# Patient Record
Sex: FEMALE | Race: WHITE | NOT HISPANIC OR LATINO | Employment: OTHER | URBAN - METROPOLITAN AREA
[De-identification: names, ages, dates, MRNs, and addresses within clinical notes are randomized per-mention and may not be internally consistent; named-entity substitution may affect disease eponyms.]

---

## 2017-11-30 ENCOUNTER — OFFICE VISIT (OUTPATIENT)
Dept: URGENT CARE | Facility: CLINIC | Age: 62
End: 2017-11-30
Payer: COMMERCIAL

## 2017-11-30 VITALS
TEMPERATURE: 98.5 F | WEIGHT: 145.4 LBS | HEART RATE: 84 BPM | RESPIRATION RATE: 16 BRPM | OXYGEN SATURATION: 97 % | SYSTOLIC BLOOD PRESSURE: 142 MMHG | HEIGHT: 65 IN | BODY MASS INDEX: 24.22 KG/M2 | DIASTOLIC BLOOD PRESSURE: 60 MMHG

## 2017-11-30 DIAGNOSIS — F41.9 ANXIETY: ICD-10-CM

## 2017-11-30 DIAGNOSIS — G47.9 DIFFICULTY SLEEPING: ICD-10-CM

## 2017-11-30 PROCEDURE — 99204 OFFICE O/P NEW MOD 45 MIN: CPT | Performed by: NURSE PRACTITIONER

## 2017-11-30 RX ORDER — ALPRAZOLAM 0.25 MG/1
0.25 TABLET ORAL 2 TIMES DAILY PRN
Qty: 10 TAB | Refills: 0 | Status: SHIPPED | OUTPATIENT
Start: 2017-11-30 | End: 2018-11-30

## 2017-11-30 RX ORDER — FLUOXETINE 20 MG/1
20 TABLET, FILM COATED ORAL DAILY
Qty: 30 TAB | Refills: 0 | Status: SHIPPED | OUTPATIENT
Start: 2017-11-30 | End: 2018-11-30

## 2017-11-30 RX ORDER — RANITIDINE 150 MG/1
150 TABLET ORAL DAILY
COMMUNITY
End: 2020-06-16

## 2017-11-30 NOTE — PROGRESS NOTES
Chief Complaint   Patient presents with   • Anxiety     unabe to sleep, personal issues       HISTORY OF PRESENT ILLNESS: Patient is a 62 y.o. female who presents to urgent care today with concerns of anxiety and difficulty sleeping. States her symptoms started three weeks ago when she stopped taking her Prozac. She has been on Prozac, at 20mg per day, for the past seven years for anxiety. States her PCP would not refill her medication without being seen and she could not get appointment in time, therefore had to DC abruptly. In addition to anxiety and difficulty sleeping, she is also feeling emotional, tearful, and is having a hard time focusing. Denies suicidal or homicidal thoughts. Denies alcohol or illicit drug use.       Patient Active Problem List    Diagnosis Date Noted   • Syncope 10/03/2016   • GERD (gastroesophageal reflux disease) 10/03/2016       Allergies:Contrast media with iodine [iodine] and Other environmental    Current Outpatient Prescriptions Ordered in [facility]   Medication Sig Dispense Refill   • ranitidine (ZANTAC) 150 MG Tab Take 150 mg by mouth 2 times a day.     • simethicone (MYLICON) 80 MG Chew Tab Take 1 Tab by mouth 3 times a day as needed (GAs). 30 Tab 3   • lactobacillus rhamnosus, GG, (CULTURELLE) 10 B CELL Cap capsule Take 1 Cap by mouth every day. 30 Cap 0   • RABEprazole Sodium (ACIPHEX SPRINKLE) 10 MG CAPSULE SPRINKLE Take 10 mg by mouth every day.     • Diphenhydramine-APAP, sleep, (TYLENOL PM EXTRA STRENGTH)  MG Tab Take 2 Tabs by mouth every bedtime.     • fluoxetine (PROZAC) 20 MG tablet Take 20 mg by mouth every day.     • fexofenadine (ALLEGRA) 180 MG tablet Take 180 mg by mouth every bedtime.     • Cholecalciferol (VITAMIN D) 2000 UNIT CAPS Take 2 Caps by mouth every day.     • Omega 3-6-9 Fatty Acids (OMEGA-3 & OMEGA-6 FISH OIL) CAPS Take 1 Cap by mouth every day.     • NON SPECIFIED Take 1 Each by mouth every bedtime. Chewable multi-vit   DO-TERRA     • Ferrous  "Fumarate 325 (106 FE) MG TABS Take 1 Tab by mouth every bedtime.       No current Epic-ordered facility-administered medications on file.        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   History reviewed. No pertinent family history.    ROS:  Review of Systems   Constitutional: Negative for fever, chills, weight loss, malaise, and fatigue.   HENT: Negative for ear pain, nosebleeds, congestion, sore throat and neck pain.    Eyes: Negative for vision changes.   Neuro: Negative for headache, sensory changes, weakness, seizure, LOC.   Cardiovascular: Negative for chest pain, palpitations, orthopnea and leg swelling.   Respiratory: Negative for cough, sputum production, shortness of breath and wheezing.   Gastrointestinal: Negative for abdominal pain, nausea, vomiting or diarrhea.   Genitourinary: Negative for dysuria, urgency and frequency.  Musculoskeletal: Negative for falls, neck pain, back pain, joint pain, myalgias.   Skin: Negative for rash, diaphoresis.   Psych: Positive for anxiety, emotional, tearful, difficulty concentrating, and difficulty sleeping.     Exam:  Blood pressure 142/60, pulse 84, temperature 36.9 °C (98.5 °F), resp. rate 16, height 1.651 m (5' 5\"), weight 66 kg (145 lb 6.4 oz), SpO2 97 %.  General: well-nourished, well-developed female, tearful  Head: normocephalic, atraumatic  Eyes: PERRLA, no conjunctival injection, acuity grossly intact, lids normal.  Ears: normal shape and symmetry, gross auditory acuity is intact.  Nose: symmetrical without tenderness, no discharge.  Mouth/Throat: reasonable hygiene, no erythema, exudates or tonsillar enlargement.  Neck: no " masses, range of motion within normal limits, no tracheal deviation. No obvious thyroid enlargement.   Lymph: no cervical adenopathy. No supraclavicular adenopathy.   Neuro: alert and oriented. Cranial nerves 1-12 grossly intact. No sensory deficit.   Cardiovascular: regular rate and rhythm. No edema.  Pulmonary: no distress. Chest is symmetrical with respiration, no wheezes, crackles, or rhonchi.   Musculoskeletal: no clubbing, appropriate muscle tone, gait is stable.  Skin: warm, dry, intact, no clubbing, no cyanosis, no rashes.   Psych: calm, quiet, tearful, appropriate        Assessment/Plan:  1. Anxiety  alprazolam (XANAX) 0.25 MG Tab    fluoxetine (PROZAC) 20 MG tablet   2. Difficulty sleeping  fluoxetine (PROZAC) 20 MG tablet       Prozac daily, may start slowly, do not dc abruptly. Establish new PCP and follow up regarding. Short course of xanax for anxiety and sleep, sedative effects of medication discussed with patient. Anxiety reducing activities encouraged. Supportive care, differential diagnoses, and indications for immediate follow-up discussed with patient.   Pathogenesis of diagnosis discussed including typical length and natural progression.   Instructed to return to clinic or nearest emergency department for any change in condition, further concerns, or worsening of symptoms.  Patient states understanding of the plan of care and discharge instructions.        Please note that this dictation was created using voice recognition software. I have made every reasonable attempt to correct obvious errors, but I expect that there are errors of grammar and possibly content that I did not discover before finalizing the note.      GINNY Aparicio.

## 2018-02-28 ENCOUNTER — HOSPITAL ENCOUNTER (OUTPATIENT)
Dept: RADIOLOGY | Facility: MEDICAL CENTER | Age: 63
End: 2018-02-28

## 2018-03-06 ENCOUNTER — TELEPHONE (OUTPATIENT)
Dept: RADIOLOGY | Facility: MEDICAL CENTER | Age: 63
End: 2018-03-06

## 2018-03-07 ENCOUNTER — HOSPITAL ENCOUNTER (OUTPATIENT)
Dept: RADIOLOGY | Facility: MEDICAL CENTER | Age: 63
End: 2018-03-07
Attending: NURSE PRACTITIONER
Payer: COMMERCIAL

## 2018-03-07 DIAGNOSIS — N64.59 ABNORMAL BREAST FINDING: ICD-10-CM

## 2018-03-07 PROCEDURE — G0279 TOMOSYNTHESIS, MAMMO: HCPCS

## 2018-03-07 PROCEDURE — 76642 ULTRASOUND BREAST LIMITED: CPT | Mod: LT

## 2018-03-13 ENCOUNTER — APPOINTMENT (RX ONLY)
Dept: URBAN - METROPOLITAN AREA CLINIC 20 | Facility: CLINIC | Age: 63
Setting detail: DERMATOLOGY
End: 2018-03-13

## 2018-03-13 DIAGNOSIS — L81.4 OTHER MELANIN HYPERPIGMENTATION: ICD-10-CM

## 2018-03-13 DIAGNOSIS — L82.1 OTHER SEBORRHEIC KERATOSIS: ICD-10-CM

## 2018-03-13 DIAGNOSIS — L57.0 ACTINIC KERATOSIS: ICD-10-CM

## 2018-03-13 DIAGNOSIS — D22 MELANOCYTIC NEVI: ICD-10-CM

## 2018-03-13 DIAGNOSIS — D18.0 HEMANGIOMA: ICD-10-CM

## 2018-03-13 DIAGNOSIS — Z71.89 OTHER SPECIFIED COUNSELING: ICD-10-CM

## 2018-03-13 PROBLEM — F41.9 ANXIETY DISORDER, UNSPECIFIED: Status: ACTIVE | Noted: 2018-03-13

## 2018-03-13 PROBLEM — D22.61 MELANOCYTIC NEVI OF RIGHT UPPER LIMB, INCLUDING SHOULDER: Status: ACTIVE | Noted: 2018-03-13

## 2018-03-13 PROBLEM — D22.5 MELANOCYTIC NEVI OF TRUNK: Status: ACTIVE | Noted: 2018-03-13

## 2018-03-13 PROBLEM — D22.62 MELANOCYTIC NEVI OF LEFT UPPER LIMB, INCLUDING SHOULDER: Status: ACTIVE | Noted: 2018-03-13

## 2018-03-13 PROBLEM — D18.01 HEMANGIOMA OF SKIN AND SUBCUTANEOUS TISSUE: Status: ACTIVE | Noted: 2018-03-13

## 2018-03-13 PROCEDURE — ? COUNSELING

## 2018-03-13 PROCEDURE — 17000 DESTRUCT PREMALG LESION: CPT

## 2018-03-13 PROCEDURE — 99214 OFFICE O/P EST MOD 30 MIN: CPT | Mod: 25

## 2018-03-13 PROCEDURE — ? LIQUID NITROGEN

## 2018-03-13 PROCEDURE — ? SUNSCREEN RECOMMENDATIONS

## 2018-03-13 PROCEDURE — 17003 DESTRUCT PREMALG LES 2-14: CPT

## 2018-03-13 ASSESSMENT — LOCATION DETAILED DESCRIPTION DERM
LOCATION DETAILED: INFERIOR THORACIC SPINE
LOCATION DETAILED: RIGHT VENTRAL DISTAL FOREARM
LOCATION DETAILED: LEFT RADIAL DORSAL HAND
LOCATION DETAILED: RIGHT MEDIAL UPPER BACK
LOCATION DETAILED: LEFT NASAL ALA
LOCATION DETAILED: LEFT VENTRAL PROXIMAL FOREARM
LOCATION DETAILED: NASAL ROOT
LOCATION DETAILED: LEFT PROXIMAL DORSAL FOREARM
LOCATION DETAILED: SUPERIOR THORACIC SPINE
LOCATION DETAILED: RIGHT RADIAL DORSAL HAND
LOCATION DETAILED: RIGHT INFERIOR MEDIAL FOREHEAD
LOCATION DETAILED: RIGHT INFERIOR UPPER BACK
LOCATION DETAILED: NASAL TIP
LOCATION DETAILED: RIGHT PROXIMAL DORSAL FOREARM

## 2018-03-13 ASSESSMENT — LOCATION SIMPLE DESCRIPTION DERM
LOCATION SIMPLE: RIGHT HAND
LOCATION SIMPLE: UPPER BACK
LOCATION SIMPLE: RIGHT FOREARM
LOCATION SIMPLE: RIGHT UPPER BACK
LOCATION SIMPLE: LEFT HAND
LOCATION SIMPLE: LEFT NOSE
LOCATION SIMPLE: RIGHT FOREHEAD
LOCATION SIMPLE: LEFT FOREARM
LOCATION SIMPLE: NOSE

## 2018-03-13 ASSESSMENT — LOCATION ZONE DERM
LOCATION ZONE: TRUNK
LOCATION ZONE: FACE
LOCATION ZONE: NOSE
LOCATION ZONE: ARM
LOCATION ZONE: HAND

## 2018-04-19 ENCOUNTER — HOSPITAL ENCOUNTER (OUTPATIENT)
Dept: RADIOLOGY | Facility: MEDICAL CENTER | Age: 63
End: 2018-04-19
Attending: NURSE PRACTITIONER
Payer: COMMERCIAL

## 2018-04-19 DIAGNOSIS — M85.80 OSTEOPENIA, UNSPECIFIED LOCATION: ICD-10-CM

## 2018-04-19 PROCEDURE — 77080 DXA BONE DENSITY AXIAL: CPT

## 2018-11-28 ENCOUNTER — APPOINTMENT (OUTPATIENT)
Dept: ADMISSIONS | Facility: MEDICAL CENTER | Age: 63
End: 2018-11-28
Payer: COMMERCIAL

## 2018-11-30 ENCOUNTER — APPOINTMENT (OUTPATIENT)
Dept: ADMISSIONS | Facility: MEDICAL CENTER | Age: 63
End: 2018-11-30
Attending: ORTHOPAEDIC SURGERY
Payer: COMMERCIAL

## 2018-11-30 DIAGNOSIS — Z01.812 PRE-OPERATIVE LABORATORY EXAMINATION: ICD-10-CM

## 2018-11-30 LAB
ERYTHROCYTE [DISTWIDTH] IN BLOOD BY AUTOMATED COUNT: 42.6 FL (ref 35.9–50)
HCT VFR BLD AUTO: 38.3 % (ref 37–47)
HGB BLD-MCNC: 12.7 G/DL (ref 12–16)
MCH RBC QN AUTO: 30.8 PG (ref 27–33)
MCHC RBC AUTO-ENTMCNC: 33.2 G/DL (ref 33.6–35)
MCV RBC AUTO: 93 FL (ref 81.4–97.8)
PLATELET # BLD AUTO: 321 K/UL (ref 164–446)
PMV BLD AUTO: 9.7 FL (ref 9–12.9)
RBC # BLD AUTO: 4.12 M/UL (ref 4.2–5.4)
WBC # BLD AUTO: 6 K/UL (ref 4.8–10.8)

## 2018-11-30 PROCEDURE — 85027 COMPLETE CBC AUTOMATED: CPT

## 2018-11-30 PROCEDURE — 36415 COLL VENOUS BLD VENIPUNCTURE: CPT

## 2018-11-30 RX ORDER — RABEPRAZOLE SODIUM 20 MG/1
20 TABLET, DELAYED RELEASE ORAL DAILY
COMMUNITY

## 2018-11-30 NOTE — OR NURSING
Pre admit apt: Pt. Instructed to continue regularly prescribed medications through day before surgery.  Instructed to take the following medications, the day of surgery, with a sip of water per anesthesia protocol: aciphex

## 2018-12-04 ENCOUNTER — HOSPITAL ENCOUNTER (OUTPATIENT)
Facility: MEDICAL CENTER | Age: 63
End: 2018-12-04
Attending: ORTHOPAEDIC SURGERY | Admitting: ORTHOPAEDIC SURGERY
Payer: COMMERCIAL

## 2018-12-04 VITALS
HEIGHT: 65 IN | HEART RATE: 67 BPM | OXYGEN SATURATION: 96 % | BODY MASS INDEX: 24.9 KG/M2 | TEMPERATURE: 97.5 F | WEIGHT: 149.47 LBS | RESPIRATION RATE: 16 BRPM

## 2018-12-04 PROCEDURE — 501838 HCHG SUTURE GENERAL: Performed by: ORTHOPAEDIC SURGERY

## 2018-12-04 PROCEDURE — 502000 HCHG MISC OR IMPLANTS RC 0278: Performed by: ORTHOPAEDIC SURGERY

## 2018-12-04 PROCEDURE — 302135 SEQUENTIAL COMPRESSION MACHINE: Performed by: ORTHOPAEDIC SURGERY

## 2018-12-04 PROCEDURE — A9270 NON-COVERED ITEM OR SERVICE: HCPCS

## 2018-12-04 PROCEDURE — 500423 HCHG DRESSING, ABD COMBINE: Performed by: ORTHOPAEDIC SURGERY

## 2018-12-04 PROCEDURE — 160002 HCHG RECOVERY MINUTES (STAT): Performed by: ORTHOPAEDIC SURGERY

## 2018-12-04 PROCEDURE — 160025 RECOVERY II MINUTES (STATS): Performed by: ORTHOPAEDIC SURGERY

## 2018-12-04 PROCEDURE — 160029 HCHG SURGERY MINUTES - 1ST 30 MINS LEVEL 4: Performed by: ORTHOPAEDIC SURGERY

## 2018-12-04 PROCEDURE — 700102 HCHG RX REV CODE 250 W/ 637 OVERRIDE(OP)

## 2018-12-04 PROCEDURE — 160046 HCHG PACU - 1ST 60 MINS PHASE II: Performed by: ORTHOPAEDIC SURGERY

## 2018-12-04 PROCEDURE — L3670 SO ACRO/CLAV CAN WEB PRE OTS: HCPCS | Performed by: ORTHOPAEDIC SURGERY

## 2018-12-04 PROCEDURE — 160035 HCHG PACU - 1ST 60 MINS PHASE I: Performed by: ORTHOPAEDIC SURGERY

## 2018-12-04 PROCEDURE — 160009 HCHG ANES TIME/MIN: Performed by: ORTHOPAEDIC SURGERY

## 2018-12-04 PROCEDURE — A4450 NON-WATERPROOF TAPE: HCPCS | Performed by: ORTHOPAEDIC SURGERY

## 2018-12-04 PROCEDURE — 700111 HCHG RX REV CODE 636 W/ 250 OVERRIDE (IP)

## 2018-12-04 PROCEDURE — A6222 GAUZE <=16 IN NO W/SAL W/O B: HCPCS | Performed by: ORTHOPAEDIC SURGERY

## 2018-12-04 PROCEDURE — 160022 HCHG BLOCK: Performed by: ORTHOPAEDIC SURGERY

## 2018-12-04 PROCEDURE — 502581 HCHG PACK, SHOULDER ARTHROSCOPY: Performed by: ORTHOPAEDIC SURGERY

## 2018-12-04 PROCEDURE — 700101 HCHG RX REV CODE 250

## 2018-12-04 PROCEDURE — 160048 HCHG OR STATISTICAL LEVEL 1-5: Performed by: ORTHOPAEDIC SURGERY

## 2018-12-04 PROCEDURE — 160041 HCHG SURGERY MINUTES - EA ADDL 1 MIN LEVEL 4: Performed by: ORTHOPAEDIC SURGERY

## 2018-12-04 PROCEDURE — 700101 HCHG RX REV CODE 250: Performed by: ORTHOPAEDIC SURGERY

## 2018-12-04 DEVICE — IMPLANTABLE DEVICE: Type: IMPLANTABLE DEVICE | Status: FUNCTIONAL

## 2018-12-04 RX ORDER — MEPERIDINE HYDROCHLORIDE 25 MG/ML
12.5 INJECTION INTRAMUSCULAR; INTRAVENOUS; SUBCUTANEOUS
Status: DISCONTINUED | OUTPATIENT
Start: 2018-12-04 | End: 2018-12-04 | Stop reason: HOSPADM

## 2018-12-04 RX ORDER — ONDANSETRON 2 MG/ML
4 INJECTION INTRAMUSCULAR; INTRAVENOUS
Status: DISCONTINUED | OUTPATIENT
Start: 2018-12-04 | End: 2018-12-04 | Stop reason: HOSPADM

## 2018-12-04 RX ORDER — SODIUM CHLORIDE, SODIUM LACTATE, POTASSIUM CHLORIDE, CALCIUM CHLORIDE 600; 310; 30; 20 MG/100ML; MG/100ML; MG/100ML; MG/100ML
INJECTION, SOLUTION INTRAVENOUS ONCE
Status: COMPLETED | OUTPATIENT
Start: 2018-12-04 | End: 2018-12-04

## 2018-12-04 RX ORDER — HYDRALAZINE HYDROCHLORIDE 20 MG/ML
5 INJECTION INTRAMUSCULAR; INTRAVENOUS
Status: DISCONTINUED | OUTPATIENT
Start: 2018-12-04 | End: 2018-12-04 | Stop reason: HOSPADM

## 2018-12-04 RX ORDER — SODIUM CHLORIDE, SODIUM LACTATE, POTASSIUM CHLORIDE, CALCIUM CHLORIDE 600; 310; 30; 20 MG/100ML; MG/100ML; MG/100ML; MG/100ML
INJECTION, SOLUTION INTRAVENOUS CONTINUOUS
Status: DISCONTINUED | OUTPATIENT
Start: 2018-12-04 | End: 2018-12-04 | Stop reason: HOSPADM

## 2018-12-04 RX ORDER — ACETAMINOPHEN 500 MG
1000 TABLET ORAL ONCE
Status: COMPLETED | OUTPATIENT
Start: 2018-12-04 | End: 2018-12-04

## 2018-12-04 RX ORDER — OXYCODONE HYDROCHLORIDE 5 MG/1
5 TABLET ORAL
Status: DISCONTINUED | OUTPATIENT
Start: 2018-12-04 | End: 2018-12-04 | Stop reason: HOSPADM

## 2018-12-04 RX ORDER — HALOPERIDOL 5 MG/ML
1 INJECTION INTRAMUSCULAR
Status: DISCONTINUED | OUTPATIENT
Start: 2018-12-04 | End: 2018-12-04 | Stop reason: HOSPADM

## 2018-12-04 RX ORDER — IPRATROPIUM BROMIDE AND ALBUTEROL SULFATE 2.5; .5 MG/3ML; MG/3ML
3 SOLUTION RESPIRATORY (INHALATION)
Status: DISCONTINUED | OUTPATIENT
Start: 2018-12-04 | End: 2018-12-04 | Stop reason: HOSPADM

## 2018-12-04 RX ORDER — OXYCODONE HCL 10 MG/1
10 TABLET, FILM COATED, EXTENDED RELEASE ORAL ONCE
Status: COMPLETED | OUTPATIENT
Start: 2018-12-04 | End: 2018-12-04

## 2018-12-04 RX ORDER — HYDROMORPHONE HYDROCHLORIDE 1 MG/ML
0.4 INJECTION, SOLUTION INTRAMUSCULAR; INTRAVENOUS; SUBCUTANEOUS
Status: DISCONTINUED | OUTPATIENT
Start: 2018-12-04 | End: 2018-12-04 | Stop reason: HOSPADM

## 2018-12-04 RX ORDER — HYDROMORPHONE HYDROCHLORIDE 1 MG/ML
0.2 INJECTION, SOLUTION INTRAMUSCULAR; INTRAVENOUS; SUBCUTANEOUS
Status: DISCONTINUED | OUTPATIENT
Start: 2018-12-04 | End: 2018-12-04 | Stop reason: HOSPADM

## 2018-12-04 RX ORDER — HYDROMORPHONE HYDROCHLORIDE 1 MG/ML
0.6 INJECTION, SOLUTION INTRAMUSCULAR; INTRAVENOUS; SUBCUTANEOUS
Status: DISCONTINUED | OUTPATIENT
Start: 2018-12-04 | End: 2018-12-04 | Stop reason: HOSPADM

## 2018-12-04 RX ORDER — OXYCODONE HYDROCHLORIDE 10 MG/1
10 TABLET ORAL
Status: DISCONTINUED | OUTPATIENT
Start: 2018-12-04 | End: 2018-12-04 | Stop reason: HOSPADM

## 2018-12-04 RX ORDER — BACITRACIN 65 UNIT/MG
POWDER (GRAM) MISCELLANEOUS
Status: DISCONTINUED | OUTPATIENT
Start: 2018-12-04 | End: 2018-12-04 | Stop reason: HOSPADM

## 2018-12-04 RX ORDER — EPINEPHRINE 1 MG/ML(1)
AMPUL (ML) INJECTION
Status: DISCONTINUED | OUTPATIENT
Start: 2018-12-04 | End: 2018-12-04 | Stop reason: HOSPADM

## 2018-12-04 RX ORDER — DIPHENHYDRAMINE HYDROCHLORIDE 50 MG/ML
12.5 INJECTION INTRAMUSCULAR; INTRAVENOUS
Status: DISCONTINUED | OUTPATIENT
Start: 2018-12-04 | End: 2018-12-04 | Stop reason: HOSPADM

## 2018-12-04 RX ORDER — ACETAMINOPHEN 500 MG
TABLET ORAL
Status: COMPLETED
Start: 2018-12-04 | End: 2018-12-04

## 2018-12-04 RX ORDER — OXYCODONE HCL 10 MG/1
TABLET, FILM COATED, EXTENDED RELEASE ORAL
Status: COMPLETED
Start: 2018-12-04 | End: 2018-12-04

## 2018-12-04 RX ADMIN — OXYCODONE HCL 10 MG: 10 TABLET, FILM COATED, EXTENDED RELEASE ORAL at 12:58

## 2018-12-04 RX ADMIN — ACETAMINOPHEN 1000 MG: 500 TABLET, COATED ORAL at 12:58

## 2018-12-04 RX ADMIN — Medication 1000 MG: at 12:58

## 2018-12-04 RX ADMIN — OXYCODONE HYDROCHLORIDE 10 MG: 10 TABLET, FILM COATED, EXTENDED RELEASE ORAL at 12:58

## 2018-12-04 RX ADMIN — LIDOCAINE HYDROCHLORIDE 0.5 ML: 10 INJECTION, SOLUTION INFILTRATION; PERINEURAL at 12:59

## 2018-12-04 RX ADMIN — SODIUM CHLORIDE, SODIUM LACTATE, POTASSIUM CHLORIDE, CALCIUM CHLORIDE: 600; 310; 30; 20 INJECTION, SOLUTION INTRAVENOUS at 12:59

## 2018-12-04 ASSESSMENT — PAIN SCALES - GENERAL
PAINLEVEL_OUTOF10: 0

## 2018-12-04 NOTE — DISCHARGE INSTRUCTIONS
ACTIVITY: Rest and take it easy for the first 24 hours.  A responsible adult is recommended to remain with you during that time.  It is normal to feel sleepy.  We encourage you to not do anything that requires balance, judgment or coordination.    MILD FLU-LIKE SYMPTOMS ARE NORMAL. YOU MAY EXPERIENCE GENERALIZED MUSCLE ACHES, THROAT IRRITATION, HEADACHE AND/OR SOME NAUSEA.    FOR 24 HOURS DO NOT:  Drive, operate machinery or run household appliances.  Drink beer or alcoholic beverages.   Make important decisions or sign legal documents.    SPECIAL INSTRUCTIONS:Post-Operative Shoulder Instructions       Dressing and wound care: Keep your shoulder dressing clean and dry after surgery.  Be aware that some leaking of blood or fluid from your dressing can occur and is normal. You may remove your dressing 3 days after the operation.  Notice that you have a single incision and/or several small incisions that have been closed with stitches.  Cover each of these incisions with a light dressing or band-aids.  This keeps the surgical incisions clean, as well as preventing your clothes from spotting with blood or fluid.  Change band-aids or light dressing daily.     Shower / bathing: Keep the shoulder dry for 3 days after your surgery.  Then, you may shower. You may let soap and water run over skin incisions, but do not immerse your shoulder in water.  No swimming pools, hot tubs, or baths are recommended until at least 3 weeks after surgery.     ** If you have had a shoulder replacement, then please wait until your staples are removed at 7-14 days post-op before allowing your incision to get wet.       Ice: Apply an ice pack to your shoulder (15 minutes on the shoulder, 15 minutes off the shoulder), as you feel necessary to help with the pain and swelling.         Sling / Shoulder Immobilizer: The sling should be on at all times, except when bathing and doing your demonstrated exercises.     Activity: It is important to move  your shoulder, as well as your elbow, wrist, and hand several times daily, starting the day after surgery.  You may do pendulum exercises with your operative arm starting the day after surgery.  Pendulum (dangling [place]) exercises are encouraged 2-3 times daily.  The sling will need to be removed for pendulum exercises.     Pain medication: Take your pain medicine, as needed and prescribed.  Do not drive or operate machinery while taking narcotic pain medication.   You may start or resume anti-inflammatory medication (i.e. ibuprofen, naproxen) anytime after surgery, which should be taken with food to avoid stomach irritation.     Problems:     If you are having problems with your shoulder (unexpected pain, excessive bleeding or discharge from your incisions, fevers/chills) do not hesitate to call the office or visit the nearest emergency room for evaluation.     Follow-up:     Make sure that you have an appointment 7-14 days following surgery.  Your stitches will be removed, and your procedure/rehabilitation will be discussed at that time.   Physical therapy may be prescribed at that time.         Anum Santana MD   [facility]   [phone]    DIET: To avoid nausea, slowly advance diet as tolerated, avoiding spicy or greasy foods for the first day.  Add more substantial food to your diet according to your physician's instructions.  Babies can be fed formula or breast milk as soon as they are hungry.  INCREASE FLUIDS AND FIBER TO AVOID CONSTIPATION.    FOLLOW-UP APPOINTMENT:  A follow-up appointment should be arranged with your doctor ; call to schedule.    You should CALL YOUR PHYSICIAN if you develop:  Fever greater than 101 degrees F.  Pain not relieved by medication, or persistent nausea or vomiting.  Excessive bleeding (blood soaking through dressing) or unexpected drainage from the wound.  Extreme redness or swelling around the incision site, drainage of pus or foul smelling  drainage.  Inability to urinate or empty your bladder within 8 hours.  Problems with breathing or chest pain.    You should call 911 if you develop problems with breathing or chest pain.  If you are unable to contact your doctor or surgical center, you should go to the nearest emergency room or urgent care center.  Physician's telephone #: [phone]    If any questions arise, call your doctor.  If your doctor is not available, please feel free to call the Surgical Center at {Surgical Dept Numbers:[id number]}.  The Center is open Monday through Friday from 7AM to 7PM.  You can also call the [place] HOTLINE open 24 hours/day, 7 days/week and speak to a nurse at [phone], or toll free at [phone].    A registered nurse may call you a few days after your surgery to see how you are doing after your procedure.    MEDICATIONS: Resume taking daily medication.  Take prescribed pain medication with food.  If no medication is prescribed, you may take non-aspirin pain medication if needed.  PAIN MEDICATION CAN BE VERY CONSTIPATING.  Take a stool softener or laxative such as senokot, pericolace, or milk of magnesia if needed.    Prescription given prior to surgery.  Last pain medication, no oral pain medication given during recovery room stay.    If your physician has prescribed pain medication that includes Acetaminophen (Tylenol), do not take additional Acetaminophen (Tylenol) while taking the prescribed medication.    Depression / Suicide Risk    As you are discharged from this [facility] facility, it is important to learn how to keep safe from harming yourself.    Recognize the warning signs:  · Abrupt changes in personality, positive or negative- including increase in energy   · Giving away possessions  · Change in eating patterns- significant weight changes-  positive or negative  · Change in sleeping patterns- unable to sleep or sleeping all the time   · Unwillingness or inability to  communicate  · Depression  · Unusual sadness, discouragement and loneliness  · Talk of wanting to die  · Neglect of personal appearance   · Rebelliousness- reckless behavior  · Withdrawal from people/activities they love  · Confusion- inability to concentrate     If you or a loved one observes any of these behaviors or has concerns about self-harm, here's what you can do:  · Talk about it- your feelings and reasons for harming yourself  · Remove any means that you might use to hurt yourself (examples: pills, rope, extension cords, firearm)  · Get professional help from the community (Mental Health, Substance Abuse, psychological counseling)  · Do not be alone:Call your Safe Contact- someone whom you trust who will be there for you.  · Call your local CRISIS HOTLINE 280-8907 or 308-041-8809  · Call your local Children's Mobile Crisis Response Team [place] (945) 383-8943 or www.Dashbell  · Call the toll free National Suicide Prevention Hotlines   · National Suicide Prevention Lifeline 126-360-KZNG (6102)  [place] [facility] 800-SUICIDE (245-7448)    Peripheral Nerve Block Discharge Instructions from Same Day Surgery and Inpatient :    What to Expect - Upper Extremity  · You may experience numbness and weakness in {ARM LOCATION PNB:408612}  on the same side as your surgery  · This is normal. For some people, this may be an unpleasant sensation. Be very careful with your numb limb  · Ask for help when you need it  Shoulder Surgery Side Effects  · In addition to numbness and weakness you may experience other symptoms  · Other nerves that are close to those nerves injected can also be affected by local anesthesia  · You may experience a hoarseness in your voice  · Your breathing may feel different  · You may also notice drooping of your eyelid, pupil constriction, and decreased sweating, on the side of your surgery  · All of these side effects are normal and will resolve when the local anesthetic  "wears off   Prevent Injury  · Protect the limb like a baby  · Beware of exposing your limb to extreme heat or cold or trauma  · The limb may be injured without you noticing because it is numb  · Keep the limb elevated whenever possible  · Do not sleep on the limb  · Change the position of the limb regularly  · Avoid putting pressure on your surgical limb  Pain Control  · The initial block on the day of surgery will make your extremity feel \"numb\"  · Any consecutive injection including prior to discharge from the hospital will make your extremity feel \"numb\"  · You may feel an aching or burning when the local anesthesia starts to wear off  · Take pain pills as prescribed by your surgeon  · Call your surgeon or anesthesiologist if you do not have adequate pain control  ·   "

## 2018-12-04 NOTE — OR NURSING
1410-arrived pacu. Pt responsive to verbal stim.  Spontaneous shallow clear resp throughout. O2 via NC @2L.  VSS. LUE in immobilizer +CMS. DSG CDI. Ice pack placed.    1420- denies pain or nausea. Vss. Warm blankets applied.  Pt tolerates po flds w/o n/v.  updated by phone.  1456- pt tolerates po flds well. Denies pain.  Vss.   1510-report called to [facility].  Pt transferred via [place] to stg 2.

## 2018-12-04 NOTE — OP REPORT
DATE OF SERVICE:  12/04/2018    PREOPERATIVE DIAGNOSES:  Left shoulder impingement syndrome, high-grade   partial-thickness rotator cuff tear, superior labral fraying.    POSTOPERATIVE DIAGNOSES:  Left shoulder impingement syndrome, high-grade   partial-thickness rotator cuff tear, superior labral fraying.    PROCEDURES:  Left shoulder arthroscopy, subacromial decompression, labral   debridement, arthroscopic rotator cuff repair.    SURGEON:  Anum Santana MD    ASSISTANT:  Brayan Vera CFA    ANESTHESIOLOGIST:  Blake Stanley MD    TYPE OF ANESTHESIA:  General with preoperative interscalene nerve block.    IV FLUID:  One liter crystalloid.    ESTIMATED BLOOD LOSS:  Minimal.    DRAINS:  None.    SPECIMENS:  None.    COMPLICATIONS:  None.    IMPLANTS:  Abdiel Iconix anchor x1.    REASON FOR PROCEDURE:  The patient is a 63-year-old female with left shoulder   pain that has been bothering her intermittently for years.  She has failed   nonoperative treatment measures and thus we decided to proceed with   arthroscopy.    DESCRIPTION OF OPERATION:  The patient was given a left interscalene nerve   block by the anesthesiologist before surgery.  Once back in the operating   room, a breathing tube was placed.  The left shoulder was then examined under   anesthesia.  She was noted to have good range of motion without instability.    The left upper extremity was then prepped with ChloraPrep and draped in   standard sterile fashion.  It was then placed in the Arthrex traction device.    Bony landmarks were drawn and a standard posterior portal was established.    The arthroscope was then inserted into the glenohumeral joint.  An   anterosuperior cannula was placed in the rotator interval, just beneath the   biceps tendon.  A probe was inserted.  The glenohumeral joint articular   cartilage surfaces were normal.  There was mild superior labral fraying.  This   was debrided tangentially with a 4-0 aggressive shaver.  The  long head of the   biceps tendon was normal throughout its intraarticular course.  The   undersurface of the rotator cuff on the articular side did not demonstrate any   full-thickness tear.  The arthroscope was then inserted into the subacromial   space.  A lateral portal was established.  There was a copious amount of   thickened, inflamed bursal tissue.  This was removed with the 4-0 aggressive   shaver.  The borders of the acromion were defined.  The 5.5 mm resector was   then used to remove the anterior curve as well as lateral edge.  Portals were   switched.  Using a standard cutting block technique from posterior to   anterior, a subacromial decompression was carried out.  This created a nice   smooth surface to the undersurface of the acromion.  Attention was then turned   to the rotator cuff below.  There was a high-grade partial-thickness   bursal-sided tear that was noted.  This was further debrided and the   articular-sided fibers were incised and the inner aspect was debrided with a   shaver.  The adjacent greater tuberosity was then prepared with the SERFAS   wand and then resector to create a bleeding healing response.  Next, the   arthroscope was placed posteriorly with a larger cannula placed laterally and   a smaller cannula placed anteriorly.  The arthroscope was then moved laterally   and an accessory percutaneous portal was used to fenestrate the greater   tuberosity and then insert a triple-loaded speed anchor just off the articular   margin.  The cobra device was then used to pass a simple suture through the   leading edge followed by a wide horizontal mattress suture and a simple suture   through the middle.  These were tied down from posterior to anterior, nicely   compressing the high-grade partial-thickness tear down to prepared greater   tuberosity bone.  One liter of bacitracin saline fluid was then flushed   through the subacromial space as well as glenohumeral joint.  All instruments    were then removed.  Portals were closed with 3-0 nylon suture.  A sterile   dressing was applied.  All drapes were removed and the arm was carefully taken   out of traction and placed into a shoulder abduction sling.  Patient was   placed supine on a stretcher and taken to the recovery room in stable   condition.       ____________________________________     MD SUSAN GONZALEZ / MORGAN    DD:  12/04/2018 14:06:08  DT:  12/04/2018 14:57:06    D#:  [id number]  Job#:  [id number]

## 2018-12-04 NOTE — OR SURGEON
Immediate Post OP Note    PreOp Diagnosis: LEFT shoulder impingement, partial thickness RCT, superior labral fraying    PostOp Diagnosis: SAME     Procedure(s): LEFT   SHOULDER DECOMPRESSION ARTHROSCOPIC - SUBACROMIAL W/LABRAL DEBRIDEMENT - Wound Class: Clean  SHOULDER ARTHROSCOPY W/ ROTATOR CUFF REPAIR - MARS - Wound Class: Clean    Surgeon(s):  Anum Santana M.D.    Anesthesiologist/Type of Anesthesia:  Anesthesiologist: Blake Stanley M.D./General    Surgical Staff:  Circulator: Anali Sheehan R.N.  Relief Circulator: Sakshi Lugo R.N.  Scrub Person: Julissa Gonzalez  First Assist: Brayan Vera C.NHusamAHusam    Specimens removed if any:  * No specimens in log *    Estimated Blood Loss: min    Findings: as above    Complications: none    Abdiel        12/4/2018 2:00 PM Anum Santana M.D.

## 2018-12-05 NOTE — OR NURSING
Respirations easy.  Fingers warm, pink and mobile with brisk cap refill.  Immobilizer in place. Ice to left shoulder. OOB tolerated well.

## 2019-01-31 ENCOUNTER — OFFICE VISIT (OUTPATIENT)
Dept: URGENT CARE | Facility: CLINIC | Age: 64
End: 2019-01-31
Payer: COMMERCIAL

## 2019-01-31 VITALS
SYSTOLIC BLOOD PRESSURE: 122 MMHG | WEIGHT: 152 LBS | TEMPERATURE: 98.7 F | HEART RATE: 74 BPM | OXYGEN SATURATION: 96 % | DIASTOLIC BLOOD PRESSURE: 70 MMHG | RESPIRATION RATE: 16 BRPM | BODY MASS INDEX: 25.33 KG/M2 | HEIGHT: 65 IN

## 2019-01-31 DIAGNOSIS — N30.00 ACUTE CYSTITIS WITHOUT HEMATURIA: ICD-10-CM

## 2019-01-31 LAB
APPEARANCE UR: NORMAL
BILIRUB UR STRIP-MCNC: NEGATIVE MG/DL
COLOR UR AUTO: YELLOW
GLUCOSE UR STRIP.AUTO-MCNC: NEGATIVE MG/DL
KETONES UR STRIP.AUTO-MCNC: NEGATIVE MG/DL
LEUKOCYTE ESTERASE UR QL STRIP.AUTO: NORMAL
NITRITE UR QL STRIP.AUTO: NEGATIVE
PH UR STRIP.AUTO: 6.5 [PH] (ref 5–8)
PROT UR QL STRIP: NEGATIVE MG/DL
RBC UR QL AUTO: NORMAL
SP GR UR STRIP.AUTO: 1
UROBILINOGEN UR STRIP-MCNC: 0.2 MG/DL

## 2019-01-31 PROCEDURE — 81002 URINALYSIS NONAUTO W/O SCOPE: CPT | Performed by: FAMILY MEDICINE

## 2019-01-31 PROCEDURE — 99214 OFFICE O/P EST MOD 30 MIN: CPT | Performed by: FAMILY MEDICINE

## 2019-01-31 RX ORDER — FLUOXETINE HYDROCHLORIDE 20 MG/1
CAPSULE ORAL
COMMUNITY
Start: 2019-01-09 | End: 2020-08-18 | Stop reason: SDUPTHER

## 2019-01-31 RX ORDER — ZOLPIDEM TARTRATE 5 MG/1
TABLET ORAL
COMMUNITY
Start: 2019-01-17 | End: 2020-06-16

## 2019-01-31 RX ORDER — NITROFURANTOIN 25; 75 MG/1; MG/1
100 CAPSULE ORAL EVERY 12 HOURS
Qty: 10 CAP | Refills: 0 | Status: SHIPPED | OUTPATIENT
Start: 2019-01-31 | End: 2019-02-05

## 2019-02-02 NOTE — PROGRESS NOTES
Subjective:        Chief Complaint   Patient presents with   • Urinary Frequency     urinary pain and frequency x 5 days                 Dysuria   This is a new problem. The current episode started in the past 5 days. The problem occurs every urination. The problem has been unchanged. The quality of the pain is described as burning. There has been no fever. Pt is not sexually active. There is no history of pyelonephritis. Associated symptoms include frequency and urgency. Pertinent negatives include no chills, discharge, flank pain, nausea or vomiting. Pt has tried nothing for the symptoms. There is no history of recurrent UTIs.     Social History     Social History   • Marital status:      Spouse name: N/A   • Number of children: N/A   • Years of education: N/A     Occupational History   • Not on file.     Social History Main Topics   • Smoking status: Never Smoker   • Smokeless tobacco: Never Used   • Alcohol use Yes      Comment: 4-5 per week   • Drug use: No   • Sexual activity: Not on file     Other Topics Concern   • Not on file     Social History Narrative   • No narrative on file         History reviewed. No pertinent family history.      Allergies   Allergen Reactions   • Contrast Media With Iodine [Iodine] Anaphylaxis     IVP dye-anaphylaxis   • Other Environmental      Seasonal hayfever-runny eyes/nose   • Sulfa Drugs Hives           Current Outpatient Prescriptions on File Prior to Visit   Medication Sig Dispense Refill   • ranitidine (ZANTAC) 150 MG Tab Take 150 mg by mouth every day.     • fluoxetine (PROZAC) 20 MG tablet Take 20 mg by mouth every day.     • fexofenadine (ALLEGRA) 180 MG tablet Take 180 mg by mouth every bedtime.     • Ferrous Fumarate 325 (106 FE) MG TABS Take 1 Tab by mouth every bedtime.     • rabeprazole (ACIPHEX) 20 MG tablet Take 20 mg by mouth every day.     • Diphenhydramine-APAP, sleep, (TYLENOL PM EXTRA STRENGTH)  MG Tab Take 2 Tabs by mouth every bedtime.    "    No current facility-administered medications on file prior to visit.        Review of Systems   Constitutional: Negative for chills.   Respiratory: Negative for shortness of breath.    Cardiovascular: Negative for chest pain.   Gastrointestinal: Negative for nausea, vomiting and abdominal pain.   Genitourinary: Positive for dysuria, urgency and frequency. Negative for flank pain.   Skin: Negative for rash.   Neurological: Negative for dizziness and headaches.   All other systems reviewed and are negative.         Objective:      Blood pressure 122/70, pulse 74, temperature 37.1 °C (98.7 °F), temperature source Temporal, resp. rate 16, height 1.651 m (5' 5\"), weight 68.9 kg (152 lb), SpO2 96 %, not currently breastfeeding.      Physical Exam   Constitutional: pt is oriented to person, place, and time. Pt appears well-developed and well-nourished. No distress.   HENT:   Head: Normocephalic and atraumatic.   Mouth/Throat: Mucous membranes are normal.   Eyes: Conjunctivae and EOM are normal. Pupils are equal, round, and reactive to light. Right eye exhibits no discharge. Left eye exhibits no discharge. No scleral icterus.   Neck: Normal range of motion. Neck supple.   Cardiovascular: Normal rate, regular rhythm, normal heart sounds and intact distal pulses.    No murmur heard.  Pulmonary/Chest: Effort normal and breath sounds normal. No respiratory distress. Pt has no wheezes,  rales.   Abdominal: Bowel sounds are normal. Pt exhibits no distension and no mass. There is no tenderness. There is no rebound, no guarding and no CVA tenderness.   Neurological: pt is alert and oriented to person, place, and time.   Skin: Skin is warm and dry.   Psychiatric: behavior is normal.   Nursing note and vitals reviewed.           Lab Results   Component Value Date/Time    POCCOLOR Yellow 01/31/2019 06:32 PM    POCAPPEAR Cloudy 01/31/2019 06:32 PM    POCLEUKEST Moderate 01/31/2019 06:32 PM    POCNITRITE Negative 01/31/2019 06:32 PM "    POCUROBILIGE 0.2 01/31/2019 06:32 PM    POCPROTEIN Negative 01/31/2019 06:32 PM    POCURPH 6.5 01/31/2019 06:32 PM    POCBLOOD Moderate 01/31/2019 06:32 PM    POCSPGRV 1.005 01/31/2019 06:32 PM    POCKETONES Negative 01/31/2019 06:32 PM    POCBILIRUBIN Negative 01/31/2019 06:32 PM    POCGLUCUA Negative 01/31/2019 06:32 PM            Assessment/Plan:     1. Acute cystitis without hematuria  UA c/w UTI    - nitrofurantoin monohydr macro (MACROBID) 100 MG Cap; Take 1 Cap by mouth every 12 hours for 5 days.  Dispense: 10 Cap; Refill: 0    Follow up in one week if no improvement, sooner if symptoms worsen.

## 2019-05-06 ENCOUNTER — APPOINTMENT (OUTPATIENT)
Dept: RADIOLOGY | Facility: IMAGING CENTER | Age: 64
End: 2019-05-06
Attending: FAMILY MEDICINE
Payer: COMMERCIAL

## 2019-05-06 ENCOUNTER — OFFICE VISIT (OUTPATIENT)
Dept: URGENT CARE | Facility: CLINIC | Age: 64
End: 2019-05-06
Payer: COMMERCIAL

## 2019-05-06 VITALS
BODY MASS INDEX: 25.49 KG/M2 | TEMPERATURE: 98.4 F | OXYGEN SATURATION: 97 % | DIASTOLIC BLOOD PRESSURE: 80 MMHG | HEART RATE: 70 BPM | SYSTOLIC BLOOD PRESSURE: 126 MMHG | HEIGHT: 65 IN | RESPIRATION RATE: 16 BRPM | WEIGHT: 153 LBS

## 2019-05-06 DIAGNOSIS — S69.91XA INJURY OF RIGHT WRIST, INITIAL ENCOUNTER: ICD-10-CM

## 2019-05-06 PROCEDURE — 99213 OFFICE O/P EST LOW 20 MIN: CPT | Performed by: FAMILY MEDICINE

## 2019-05-06 PROCEDURE — 73110 X-RAY EXAM OF WRIST: CPT | Mod: TC,RT | Performed by: FAMILY MEDICINE

## 2019-05-06 ASSESSMENT — ENCOUNTER SYMPTOMS
MUSCLE WEAKNESS: 0
TINGLING: 0
NUMBNESS: 0

## 2019-05-06 NOTE — PATIENT INSTRUCTIONS
Wrist Pain, Adult  There are many things that can cause wrist pain. Some common causes include:  · An injury to the wrist area, such as a sprain, strain, or fracture.  · Overuse of the joint.  · A condition that causes increased pressure on a nerve in the wrist (carpal tunnel syndrome).  · Wear and tear of the joints that occurs with aging (osteoarthritis).  · A variety of other types of arthritis.  Sometimes, the cause of wrist pain is not known. Often, the pain goes away when you follow instructions from your health care provider for relieving pain at home, such as resting or icing the wrist. If your wrist pain continues, it is important to tell your health care provider.  Follow these instructions at home:  · Rest the wrist area for at least 48 hours or as long as told by your health care provider.  · If a splint or elastic bandage has been applied, use it as told by your health care provider.  ¨ Remove the splint or bandage only as told by your health care provider.  ¨ Loosen the splint or bandage if your fingers tingle, become numb, or turn cold or blue.  · If directed, apply ice to the injured area.  ¨ If you have a removable splint or elastic bandage, remove it as told by your health care provider.  ¨ Put ice in a plastic bag.  ¨ Place a towel between your skin and the bag or between your splint or bandage and the bag.  ¨ Leave the ice on for 20 minutes, 2-3 times a day.  · Keep your arm raised (elevated) above the level of your heart while you are sitting or lying down.  · Take over-the-counter and prescription medicines only as told by your health care provider.  · Keep all follow-up visits as told by your health care provider. This is important.  Contact a health care provider if:  · You have a sudden sharp pain in the wrist, hand, or arm that is different or new.  · The swelling or bruising on your wrist or hand gets worse.  · Your skin becomes red, gets a rash, or has open sores.  · Your pain does not  get better or it gets worse.  Get help right away if:  · You lose feeling in your fingers or hand.  · Your fingers turn white, very red, or cold and blue.  · You cannot move your fingers.  · You have a fever or chills.  This information is not intended to replace advice given to you by your health care provider. Make sure you discuss any questions you have with your health care provider.  Document Released: 09/27/2006 Document Revised: 07/13/2017 Document Reviewed: 07/06/2017  Elsevier Interactive Patient Education © 2017 [employer]

## 2019-05-06 NOTE — PROGRESS NOTES
"Subjective:   Radha Schumacher is a 63 y.o. female who presents for Wrist Injury (x yesterday, Rt. wrist pain after fall, little swelling)     Wrist Injury    The incident occurred 12 to 24 hours ago. The incident occurred at home. The injury mechanism was a fall. The pain is present in the right wrist. The quality of the pain is described as aching. The pain does not radiate. The pain is moderate. The pain has been constant since the incident. Pertinent negatives include no chest pain, muscle weakness, numbness or tingling.     Review of Systems   Cardiovascular: Negative for chest pain.   Neurological: Negative for tingling and numbness.      Objective:   /80 (BP Location: Left arm, Patient Position: Sitting, BP Cuff Size: Adult)   Pulse 70   Temp 36.9 °C (98.4 °F) (Temporal)   Resp 16   Ht 1.651 m (5' 5\")   Wt 69.4 kg (153 lb)   SpO2 97%   BMI 25.46 kg/m²   Physical Exam   Constitutional: She is oriented to person, place, and time. She appears well-developed and well-nourished. No distress.   HENT:   Head: Normocephalic and atraumatic.   Eyes: Pupils are equal, round, and reactive to light. Conjunctivae and EOM are normal.   Cardiovascular: Normal rate and regular rhythm.    No murmur heard.  Pulmonary/Chest: Effort normal and breath sounds normal. No respiratory distress.   Abdominal: Soft. She exhibits no distension. There is no tenderness.   Musculoskeletal:        Right wrist: She exhibits decreased range of motion, bony tenderness and swelling. She exhibits no deformity.   Neurological: She is alert and oriented to person, place, and time. She has normal reflexes. No sensory deficit.   Skin: Skin is warm and dry.   Psychiatric: She has a normal mood and affect.        Assessment/Plan:   1. Injury of right wrist, initial encounter  - DX-WRIST-COMPLETE 3+ RIGHT; Future  Use over-the-counter pain reliever, such as acetaminophen (Tylenol), ibuprofen (Advil, Motrin) or naproxen (Aleve) as needed; " follow package directions for dosing.   Differential diagnosis, natural history, supportive care, and indications for immediate follow-up discussed.

## 2020-06-16 ENCOUNTER — TELEPHONE (OUTPATIENT)
Dept: MEDICAL GROUP | Facility: PHYSICIAN GROUP | Age: 65
End: 2020-06-16

## 2020-06-16 ENCOUNTER — OFFICE VISIT (OUTPATIENT)
Dept: MEDICAL GROUP | Facility: PHYSICIAN GROUP | Age: 65
End: 2020-06-16
Payer: MEDICARE

## 2020-06-16 VITALS
DIASTOLIC BLOOD PRESSURE: 78 MMHG | RESPIRATION RATE: 16 BRPM | HEIGHT: 65 IN | BODY MASS INDEX: 25.62 KG/M2 | OXYGEN SATURATION: 96 % | SYSTOLIC BLOOD PRESSURE: 118 MMHG | TEMPERATURE: 99.5 F | HEART RATE: 76 BPM | WEIGHT: 153.8 LBS

## 2020-06-16 DIAGNOSIS — E61.1 IRON DEFICIENCY: ICD-10-CM

## 2020-06-16 DIAGNOSIS — M85.89 OSTEOPENIA OF MULTIPLE SITES: ICD-10-CM

## 2020-06-16 DIAGNOSIS — F41.9 ANXIETY: ICD-10-CM

## 2020-06-16 DIAGNOSIS — Z12.31 ENCOUNTER FOR SCREENING MAMMOGRAM FOR BREAST CANCER: ICD-10-CM

## 2020-06-16 DIAGNOSIS — K21.9 GASTROESOPHAGEAL REFLUX DISEASE, ESOPHAGITIS PRESENCE NOT SPECIFIED: ICD-10-CM

## 2020-06-16 PROBLEM — F32.A DEPRESSION: Status: ACTIVE | Noted: 2018-01-29

## 2020-06-16 PROCEDURE — 99214 OFFICE O/P EST MOD 30 MIN: CPT | Performed by: FAMILY MEDICINE

## 2020-06-16 RX ORDER — FAMOTIDINE 20 MG/1
20 TABLET, FILM COATED ORAL 2 TIMES DAILY
COMMUNITY
End: 2020-09-17 | Stop reason: SDUPTHER

## 2020-06-16 ASSESSMENT — PATIENT HEALTH QUESTIONNAIRE - PHQ9: CLINICAL INTERPRETATION OF PHQ2 SCORE: 0

## 2020-06-16 NOTE — ASSESSMENT & PLAN NOTE
This is a chronic problem.  The patient has had hx of anxiety and has taken prozac 20mg daily  For the past 8 years. She used to be on xanax. She takes this daily,.   She denies any suicidality/homicidality

## 2020-06-16 NOTE — ASSESSMENT & PLAN NOTE
"THis is a chronic problem.  She has a hx of iron deficiency anemia  Last time it was checked was 1 year ago  She is now on Ferrous Fumarate 325mg daily for the past 6 years  She is not acutely bleeding  She completed \"all kinds of tests\" including colonoscopy which were normal.  She also ingested a pill with a camera and they think it may be an AVM.   She follows up with gastroenterology.   "

## 2020-06-16 NOTE — PROGRESS NOTES
"Subjective:     Chief Complaint   Patient presents with   • Establish Care       HPI:   Radha presents today to discuss the following.  Prior PCP: PCP at [facility]    Anxiety  This is a chronic problem.  The patient has had hx of anxiety and has taken prozac 20mg daily  For the past 8 years. She used to be on xanax. She takes this daily,.   She denies any suicidality/homicidality    Iron deficiency  THis is a chronic problem.  She has a hx of iron deficiency anemia  Last time it was checked was 1 year ago  She is now on Ferrous Fumarate 325mg daily for the past 6 years  She is not acutely bleeding  She completed \"all kinds of tests\" including colonoscopy which were normal.  She also ingested a pill with a camera and they think it may be an AVM.   She follows up with gastroenterology.     GERD (gastroesophageal reflux disease)  This is a chronic problem  The patient follows up with GI  She is taking Rabeprazole and pepcid  Symptoms are well controlled   Avoiding triggers except for soda daily     Osteopenia of multiple sites  This is a chronic issue.  The patient completed DXA in 2018 positive for osteopenia lumbar spine and left femur.   She would like to repeat.       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     • FLUoxetine (PROZAC) 20 MG Cap      • rabeprazole (ACIPHEX) 20 MG tablet Take 20 mg by mouth every day.     • " "Diphenhydramine-APAP, sleep, (TYLENOL PM EXTRA STRENGTH)  MG Tab Take 2 Tabs by mouth every bedtime.     • fexofenadine (ALLEGRA) 180 MG tablet Take 180 mg by mouth every bedtime.     • Ferrous Fumarate 325 (106 FE) MG TABS Take 1 Tab by mouth every bedtime.       No current Epic-ordered facility-administered medications on file.        Allergies:  Contrast media with iodine [iodine]; Other environmental; and Sulfa drugs    Health Maintenance: Completed    ROS:  Gen: no fevers/chills, no changes in weight  Eyes: no changes in vision  ENT: no sore throat, no hearing loss, no bloody nose  Pulm: no sob, no cough  CV: no chest pain, no palpitations  GI: no nausea/vomiting, no diarrhea  : no dysuria      Objective:     Exam:  /78 (BP Location: Left arm, Patient Position: Sitting, BP Cuff Size: Adult)   Pulse 76   Temp 37.5 °C (99.5 °F) (Temporal)   Resp 16   Ht 1.651 m (5' 5\")   Wt 69.8 kg (153 lb 12.8 oz)   SpO2 96%   BMI 25.59 kg/m²  Body mass index is 25.59 kg/m².    Constitutional: Alert, no distress, well-groomed.  Skin: Warm, dry, good turgor, no rashes in visible areas.  Eye: Equal, round and reactive, conjunctiva clear, lids normal.  ENMT: Lips without lesions, good dentition, moist mucous membranes.  Neck: Trachea midline, no masses, no thyromegaly.  Respiratory: Unlabored respiratory effort, no cough.  MSK: Normal gait, moves all extremities.  Neuro: Grossly non-focal.   Psych: Alert and oriented x3, normal affect and mood.      Assessment & Plan:     64 y.o. female with the following -     1. Anxiety  This is a chronic condition.  The patient has a history of anxiety currently controlled with Prozac 20 mg daily.  We will continue current regimen.    2. Iron deficiency  This is a chronic condition.  The patient has a history of iron deficiency anemia.  She has followed up closely with gastroenterology.  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  Time I will repeat iron studies " to see if there is still need to continue with iron supplementation.  -Continue to follow-up with gastroenterology  - CBC WITHOUT DIFFERENTIAL; Future  - Comp Metabolic Panel; Future  - IRON/TOTAL IRON BIND; Future  - FERRITIN; Future  - TRANSFERRIN; Future    3. Gastroesophageal reflux disease, esophagitis presence not specified  This is a chronic condition.  The patient has a history of GERD and follows up with gastroenterology.  She is on AcipHex as well as Pepcid.    4. Encounter for screening mammogram for breast cancer  - MA-SCREENING MAMMO BILAT W/TOMOSYNTHESIS W/CAD; Future    5. Osteopenia of multiple sites  This is a chronic condition.  The patient has a history of osteopenia to the lumbar spine and left femur.  Last DEXA scan was in 2018.  We will repeat DEXA scan today.  - DS-BONE DENSITY STUDY (DEXA); Future      Return in about 6 months (around 12/16/2020) for FU on conditions .    Please note that this dictation was created using voice recognition software. I have made every reasonable attempt to correct obvious errors, but I expect that there are errors of grammar and possibly content that I did not discover before finalizing the note.

## 2020-06-16 NOTE — ASSESSMENT & PLAN NOTE
This is a chronic problem  The patient follows up with GI  She is taking Rabeprazole and pepcid  Symptoms are well controlled   Avoiding triggers except for soda daily

## 2020-06-16 NOTE — ASSESSMENT & PLAN NOTE
This is a chronic issue.  The patient completed DXA in 2018 positive for osteopenia lumbar spine and left femur.   She would like to repeat.

## 2020-06-29 ENCOUNTER — HOSPITAL ENCOUNTER (OUTPATIENT)
Dept: LAB | Facility: MEDICAL CENTER | Age: 65
End: 2020-06-29
Attending: FAMILY MEDICINE
Payer: COMMERCIAL

## 2020-06-29 DIAGNOSIS — E61.1 IRON DEFICIENCY: ICD-10-CM

## 2020-06-29 LAB
ALBUMIN SERPL BCP-MCNC: 4.3 G/DL (ref 3.2–4.9)
ALBUMIN/GLOB SERPL: 2 G/DL
ALP SERPL-CCNC: 79 U/L (ref 30–99)
ALT SERPL-CCNC: 16 U/L (ref 2–50)
ANION GAP SERPL CALC-SCNC: 9 MMOL/L (ref 7–16)
AST SERPL-CCNC: 17 U/L (ref 12–45)
BILIRUB SERPL-MCNC: 0.3 MG/DL (ref 0.1–1.5)
BUN SERPL-MCNC: 10 MG/DL (ref 8–22)
CALCIUM SERPL-MCNC: 9 MG/DL (ref 8.5–10.5)
CHLORIDE SERPL-SCNC: 105 MMOL/L (ref 96–112)
CO2 SERPL-SCNC: 28 MMOL/L (ref 20–33)
CREAT SERPL-MCNC: 0.72 MG/DL (ref 0.5–1.4)
ERYTHROCYTE [DISTWIDTH] IN BLOOD BY AUTOMATED COUNT: 43.4 FL (ref 35.9–50)
FERRITIN SERPL-MCNC: 91.9 NG/ML (ref 10–291)
GLOBULIN SER CALC-MCNC: 2.1 G/DL (ref 1.9–3.5)
GLUCOSE SERPL-MCNC: 91 MG/DL (ref 65–99)
HCT VFR BLD AUTO: 40.4 % (ref 37–47)
HGB BLD-MCNC: 13 G/DL (ref 12–16)
IRON SATN MFR SERPL: 28 % (ref 15–55)
IRON SERPL-MCNC: 78 UG/DL (ref 40–170)
MCH RBC QN AUTO: 30.6 PG (ref 27–33)
MCHC RBC AUTO-ENTMCNC: 32.2 G/DL (ref 33.6–35)
MCV RBC AUTO: 95.1 FL (ref 81.4–97.8)
PLATELET # BLD AUTO: 314 K/UL (ref 164–446)
PMV BLD AUTO: 10 FL (ref 9–12.9)
POTASSIUM SERPL-SCNC: 4 MMOL/L (ref 3.6–5.5)
PROT SERPL-MCNC: 6.4 G/DL (ref 6–8.2)
RBC # BLD AUTO: 4.25 M/UL (ref 4.2–5.4)
SODIUM SERPL-SCNC: 142 MMOL/L (ref 135–145)
TIBC SERPL-MCNC: 282 UG/DL (ref 250–450)
TRANSFERRIN SERPL-MCNC: 242 MG/DL (ref 200–370)
UIBC SERPL-MCNC: 204 UG/DL (ref 110–370)
WBC # BLD AUTO: 4.1 K/UL (ref 4.8–10.8)

## 2020-06-29 PROCEDURE — 80053 COMPREHEN METABOLIC PANEL: CPT

## 2020-06-29 PROCEDURE — 84466 ASSAY OF TRANSFERRIN: CPT

## 2020-06-29 PROCEDURE — 85027 COMPLETE CBC AUTOMATED: CPT

## 2020-06-29 PROCEDURE — 83550 IRON BINDING TEST: CPT

## 2020-06-29 PROCEDURE — 83540 ASSAY OF IRON: CPT

## 2020-06-29 PROCEDURE — 36415 COLL VENOUS BLD VENIPUNCTURE: CPT

## 2020-06-29 PROCEDURE — 82728 ASSAY OF FERRITIN: CPT

## 2020-06-30 ENCOUNTER — HOSPITAL ENCOUNTER (OUTPATIENT)
Dept: RADIOLOGY | Facility: MEDICAL CENTER | Age: 65
End: 2020-06-30
Attending: FAMILY MEDICINE
Payer: COMMERCIAL

## 2020-06-30 DIAGNOSIS — Z12.31 ENCOUNTER FOR SCREENING MAMMOGRAM FOR BREAST CANCER: ICD-10-CM

## 2020-06-30 PROCEDURE — 77067 SCR MAMMO BI INCL CAD: CPT

## 2020-07-20 ENCOUNTER — HOSPITAL ENCOUNTER (OUTPATIENT)
Dept: RADIOLOGY | Facility: MEDICAL CENTER | Age: 65
End: 2020-07-20
Attending: FAMILY MEDICINE
Payer: MEDICARE

## 2020-07-20 DIAGNOSIS — M85.89 OSTEOPENIA OF MULTIPLE SITES: ICD-10-CM

## 2020-07-20 PROCEDURE — 77080 DXA BONE DENSITY AXIAL: CPT

## 2020-08-18 RX ORDER — FLUOXETINE HYDROCHLORIDE 20 MG/1
20 CAPSULE ORAL DAILY
Qty: 30 CAP | Refills: 0 | Status: SHIPPED | OUTPATIENT
Start: 2020-08-18 | End: 2020-09-17 | Stop reason: SDUPTHER

## 2020-08-18 NOTE — TELEPHONE ENCOUNTER
----- Message from Radha Schumacher sent at 8/18/2020 10:09 AM PDT -----  Regarding: Prescription Question  Contact: [phone]  I need a refill on my Fluoxetine 20mg called into [facility] on [address]. Sorry this diya wouldn't let me do it online.  Thanks ,  Deya

## 2020-09-02 ENCOUNTER — HOSPITAL ENCOUNTER (OUTPATIENT)
Dept: LAB | Facility: MEDICAL CENTER | Age: 65
End: 2020-09-02
Attending: FAMILY MEDICINE
Payer: MEDICARE

## 2020-09-02 ENCOUNTER — OFFICE VISIT (OUTPATIENT)
Dept: MEDICAL GROUP | Facility: PHYSICIAN GROUP | Age: 65
End: 2020-09-02
Payer: MEDICARE

## 2020-09-02 VITALS
DIASTOLIC BLOOD PRESSURE: 70 MMHG | WEIGHT: 153.44 LBS | OXYGEN SATURATION: 95 % | TEMPERATURE: 98.5 F | BODY MASS INDEX: 25.56 KG/M2 | SYSTOLIC BLOOD PRESSURE: 130 MMHG | HEIGHT: 65 IN | HEART RATE: 68 BPM

## 2020-09-02 DIAGNOSIS — R10.13 ACUTE EPIGASTRIC PAIN: ICD-10-CM

## 2020-09-02 PROCEDURE — 36415 COLL VENOUS BLD VENIPUNCTURE: CPT

## 2020-09-02 PROCEDURE — 83690 ASSAY OF LIPASE: CPT

## 2020-09-02 PROCEDURE — 99214 OFFICE O/P EST MOD 30 MIN: CPT | Performed by: FAMILY MEDICINE

## 2020-09-02 PROCEDURE — 80053 COMPREHEN METABOLIC PANEL: CPT

## 2020-09-02 RX ORDER — SUCRALFATE 1 G/1
1 TABLET ORAL
Qty: 120 TAB | Refills: 3 | Status: SHIPPED | OUTPATIENT
Start: 2020-09-02 | End: 2020-09-17

## 2020-09-02 ASSESSMENT — FIBROSIS 4 INDEX: FIB4 SCORE: 0.88

## 2020-09-03 LAB
ALBUMIN SERPL BCP-MCNC: 4.5 G/DL (ref 3.2–4.9)
ALBUMIN/GLOB SERPL: 1.9 G/DL
ALP SERPL-CCNC: 87 U/L (ref 30–99)
ALT SERPL-CCNC: 20 U/L (ref 2–50)
ANION GAP SERPL CALC-SCNC: 10 MMOL/L (ref 7–16)
AST SERPL-CCNC: 19 U/L (ref 12–45)
BILIRUB SERPL-MCNC: 0.2 MG/DL (ref 0.1–1.5)
BUN SERPL-MCNC: 16 MG/DL (ref 8–22)
CALCIUM SERPL-MCNC: 9.3 MG/DL (ref 8.5–10.5)
CHLORIDE SERPL-SCNC: 101 MMOL/L (ref 96–112)
CO2 SERPL-SCNC: 28 MMOL/L (ref 20–33)
CREAT SERPL-MCNC: 0.73 MG/DL (ref 0.5–1.4)
FASTING STATUS PATIENT QL REPORTED: NORMAL
GLOBULIN SER CALC-MCNC: 2.4 G/DL (ref 1.9–3.5)
GLUCOSE SERPL-MCNC: 96 MG/DL (ref 65–99)
LIPASE SERPL-CCNC: 31 U/L (ref 11–82)
POTASSIUM SERPL-SCNC: 4.2 MMOL/L (ref 3.6–5.5)
PROT SERPL-MCNC: 6.9 G/DL (ref 6–8.2)
SODIUM SERPL-SCNC: 139 MMOL/L (ref 135–145)

## 2020-09-03 NOTE — PROGRESS NOTES
"Subjective:      Radha Schumacher is a 65 y.o. female who presents with Pain (between breast but lower) and Gas (burping)            HPI     This is a 65-year-old white female who is a patient of Dr. Prince.  Dr. Prince is not available today.    She is here because of pain in the epigastric area which is described as sharp pain that has been ongoing for about a week now.  There is associated nausea but no vomiting.  She also has been burping with passing of gas and flatulence.  Denies any problems with bowel movement.  No blood in the stool.  She said she thinks this was caused by taking Aleve over-the-counter 2 tablets twice a day because of pain in her lower back and her hip which happened after she was walking her dog.  She went to the chiropractor to get this treated last week and she was told that she could have a herniated disc.  She was given heat stimulation.  She said she stopped taking the Aleve 2 days ago because of the pain but the pain has not improved.  Patient already has history of GERD for which she takes AcipHex 1 tablet in the morning and the Pepcid 2 tablets at night.  She said she had upper endoscopy in December 2019 that showed polyps in the stomach which were benign on biopsy.  She also had colonoscopy at the same time that she claimed did not show any abnormal findings.    She admits to drinking 1 can of diet Dr. Pepper per day and a cup of tea per day.    Past medical history, past surgical history, family history reviewed-no changes    Social history reviewed-no changes    Allergies reviewed-no changes    Medications reviewed-no changes    ROS     As per HPI, the rest are negative.       Objective:     /70 (BP Location: Left arm, Patient Position: Sitting, BP Cuff Size: Adult)   Pulse 68   Temp 36.9 °C (98.5 °F) (Temporal)   Ht 1.651 m (5' 5\")   Wt 69.6 kg (153 lb 7 oz)   SpO2 95%   BMI 25.53 kg/m²      Physical Exam     Examined alert, awake, oriented, not in " distress    Neck-supple, no lymphadenopathy, no thyromegaly  Lungs-clear to auscultation, no rales, no wheezes  Heart-regular rate and rhythm, no murmur  Abdomen-good bowel sounds, soft, positive tenderness epigastric area, no hepatosplenomegaly, no masses, no CVA tenderness  Extremities-no edema, clubbing, cyanosis          Assessment/Plan:        1. Acute epigastric pain  This could be due to gastritis or peptic ulcer disease caused by taking Aleve.  Patient already has underlying GERD for which she requires both PPI and H2 blocker.  Differential diagnosis are acute pancreatitis, gallbladder disease.  I will check lipase.  I will check CMP.  I will add Carafate 1 tablet 4 times a day before meals and at bedtime to be taken for the next 1 month.  Advised to avoid all caffeinated drinks for now.  Avoid all NSAIDs, aspirin and aspirin products.  I will communicate results to patient.  She may need abdominal ultrasound if there is no improvement with above management.  Consider referral back to GI specialist if there is no improvement with the above management.  She will return in 2 weeks to see her PCP for follow-up.  - LIPASE; Future  - Comp Metabolic Panel; Future  - sucralfate (CARAFATE) 1 GM Tab; Take 1 Tab by mouth 4 Times a Day,Before Meals and at Bedtime.  Dispense: 120 Tab; Refill: 3      Please note that this dictation was created using voice recognition software. I have worked with consultants from the vendor as well as technical experts from [facility]  [employer] to optimize the interface. I have made every reasonable attempt to correct obvious errors, but I expect that there are errors of grammar and possibly content I did not discover before finalizing the note.

## 2020-09-14 ENCOUNTER — TELEPHONE (OUTPATIENT)
Dept: MEDICAL GROUP | Facility: PHYSICIAN GROUP | Age: 65
End: 2020-09-14

## 2020-09-14 NOTE — TELEPHONE ENCOUNTER
ESTABLISHED PATIENT PRE-VISIT PLANNING     Patient was NOT contacted to complete PVP.     Note: Patient will not be contacted if there is no indication to call.     1.  Reviewed notes from the last few office visits within the medical group: Yes    2.  If any orders were placed at last visit or intended to be done for this visit (i.e. 6 mos follow-up), do we have Results/Consult Notes?        •  Labs - Labs ordered, completed on 09/02/2002 and results are in chart.   Note: If patient appointment is for lab review and patient did not complete labs, check with provider if OK to reschedule patient until labs completed.       •  Imaging - Imaging ordered, completed and results are in chart.       •  Referrals - No referrals were ordered at last office visit.    3. Is this appointment scheduled as a Hospital Follow-Up? No    4.  Immunizations were updated in Epic using WebIZ?: Epic matches WebIZ       •  Web Iz Recommendations: FLU, PNEUMOVAX (PPSV23) and SHINGRIX (Shingles)    5.  Patient is due for the following Health Maintenance Topics:   Health Maintenance Due   Topic Date Due   • Annual Wellness Visit  1955   • HEPATITIS C SCREENING  1955   • PAP SMEAR  06/28/1976   • COLONOSCOPY  06/28/2005   • IMM ZOSTER VACCINES (1 of 2) 06/28/2005   • IMM PNEUMOCOCCAL VACCINE: 65+ Years (1 of 1 - PPSV23) 06/28/2020   • IMM INFLUENZA (1) 09/01/2020       - Patient plans to schedule appointment for Annual Wellness Visit (AWV).    6. Orders for overdue Health Maintenance topics pended in Pre-Charting? N\A    7.  AHA (MDX) form printed for Provider? YES    8.  Patient was NOT informed to arrive 15 min prior to their scheduled appointment and bring in their medication bottles.

## 2020-09-17 ENCOUNTER — OFFICE VISIT (OUTPATIENT)
Dept: MEDICAL GROUP | Facility: PHYSICIAN GROUP | Age: 65
End: 2020-09-17
Payer: MEDICARE

## 2020-09-17 VITALS
TEMPERATURE: 97.2 F | BODY MASS INDEX: 25.62 KG/M2 | RESPIRATION RATE: 16 BRPM | WEIGHT: 153.8 LBS | SYSTOLIC BLOOD PRESSURE: 114 MMHG | DIASTOLIC BLOOD PRESSURE: 72 MMHG | HEIGHT: 65 IN | HEART RATE: 68 BPM | OXYGEN SATURATION: 94 %

## 2020-09-17 DIAGNOSIS — F41.9 ANXIETY: ICD-10-CM

## 2020-09-17 DIAGNOSIS — E61.1 IRON DEFICIENCY: ICD-10-CM

## 2020-09-17 DIAGNOSIS — R10.13 EPIGASTRIC ABDOMINAL PAIN: ICD-10-CM

## 2020-09-17 PROCEDURE — 99214 OFFICE O/P EST MOD 30 MIN: CPT | Performed by: FAMILY MEDICINE

## 2020-09-17 RX ORDER — FAMOTIDINE 20 MG/1
40 TABLET, FILM COATED ORAL DAILY
Qty: 180 TAB | Refills: 3 | Status: SHIPPED | OUTPATIENT
Start: 2020-09-17 | End: 2020-12-23 | Stop reason: SDUPTHER

## 2020-09-17 RX ORDER — FLUOXETINE HYDROCHLORIDE 20 MG/1
20 CAPSULE ORAL DAILY
Qty: 90 CAP | Refills: 3 | Status: SHIPPED | OUTPATIENT
Start: 2020-09-17 | End: 2021-09-20 | Stop reason: SDUPTHER

## 2020-09-17 ASSESSMENT — FIBROSIS 4 INDEX: FIB4 SCORE: 0.88

## 2020-09-17 NOTE — ASSESSMENT & PLAN NOTE
This is a chronic problem  The patient is taking prozac 20mg daily  She takes it consistently   Denies any Si/HI

## 2020-09-17 NOTE — ASSESSMENT & PLAN NOTE
This is a chronic problem  The patient has hx of iron deficiency anemia currently on iron supplement non-stop.   Iron studies this year are wnl.  She continues with supplementation.

## 2020-09-17 NOTE — PROGRESS NOTES
Subjective:     Chief Complaint   Patient presents with   • Follow-Up       HPI:   Radha presents today to discuss the following.    Epigastric abdominal pain  This is an established condition.    Symptom onset: The patient began to experience a flare of epigastric pain over the past couple of weeks. Evaluated by Dr Chauhan for this on 9/2/20.   Current symptoms: Improved epigastric pain   Since onset symptoms are: Unchanged  Modifying factors: She is on PPI and H2 blockers. Stopped taking Aleeve and has not taken it for some time.   Treatments tried: tried sucralfate but gave her diarrhea. Discontinued.   Associated symptoms: Denies any.         Anxiety  This is a chronic problem  The patient is taking prozac 20mg daily  She takes it consistently   Denies any Si/HI    Iron deficiency  This is a chronic problem  The patient has hx of iron deficiency anemia currently on iron supplement non-stop.   Iron studies this year are wnl.  She continues with supplementation.       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 90 Cap 3   • famotidine (PEPCID) 20 MG Tab Take 2 Tabs by mouth every day. 180 Tab 3   • rabeprazole (ACIPHEX) 20 MG tablet Take 20 mg by mouth every day.     • Diphenhydramine-APAP, sleep, (TYLENOL PM EXTRA STRENGTH)  MG Tab Take 2 Tabs by mouth every bedtime.     • fexofenadine (ALLEGRA) 180 MG tablet Take 180 mg  "by mouth every bedtime.     • Ferrous Fumarate 325 (106 FE) MG TABS Take 1 Tab by mouth every bedtime.       No current Epic-ordered facility-administered medications on file.        Allergies:  Contrast media with iodine [iodine], Other environmental, and Sulfa drugs    Health Maintenance: Completed    ROS:  Gen: no fevers/chills, no changes in weight  Eyes: no changes in vision  ENT: no sore throat, no hearing loss, no bloody nose  Pulm: no sob, no cough  CV: no chest pain, no palpitations  GI: no nausea/vomiting, no diarrhea  : no dysuria      Objective:     Exam:  /72 (BP Location: Left arm, Patient Position: Sitting, BP Cuff Size: Adult)   Pulse 68   Temp 36.2 °C (97.2 °F) (Temporal)   Resp 16   Ht 1.651 m (5' 5\")   Wt 69.8 kg (153 lb 12.8 oz)   SpO2 94%   BMI 25.59 kg/m²  Body mass index is 25.59 kg/m².    Constitutional: Alert, no distress, well-groomed.  Skin: Warm, dry, good turgor, no rashes in visible areas.  Eye: Equal, round and reactive, conjunctiva clear, lids normal.  ENMT: Lips without lesions, good dentition, moist mucous membranes.  Neck: Trachea midline, no masses, no thyromegaly.  Respiratory: Unlabored respiratory effort, no cough.  MSK: Normal gait, moves all extremities.  Neuro: Grossly non-focal.   Psych: Alert and oriented x3, normal affect and mood.      Assessment & Plan:     65 y.o. female with the following -     1. Epigastric abdominal pain  This is an established problem.  The patient returns for follow-up today after endorsing epigastric tenderness earlier this month was evaluated by Dr. Chauhan.  Likely culprit was the fact that she was indulging in extra Dr. braga at the time.  She has been weaning off at this point.  She also continues to avoid NSAIDs.  Trial of sucralfate gave her diarrhea.  Overall her pain is stable.  If it continues we will pursue an abdominal ultrasound.  She is already following up with gastroenterology.    2. Anxiety  This is a chronic " condition.  She has a history of anxiety well-controlled with fluoxetine 20 mg daily.  She is requesting a refill for this today.  Denies any SI/HI.  - FLUoxetine (PROZAC) 20 MG Cap; Take 1 Cap by mouth every day.  Dispense: 90 Cap; Refill: 3    3. Iron deficiency  This is a chronic condition.  Most recent iron studies this year are within normal limits.  She continues to take iron supplementation on a daily basis.  No signs of iron overload on labs.      Return in about 1 year (around 9/17/2021).    Please note that this dictation was created using voice recognition software. I have made every reasonable attempt to correct obvious errors, but I expect that there are errors of grammar and possibly content that I did not discover before finalizing the note.

## 2020-09-17 NOTE — ASSESSMENT & PLAN NOTE
This is an established condition.    Symptom onset: The patient began to experience a flare of epigastric pain over the past couple of weeks. Evaluated by Dr Chauhan for this on 9/2/20.   Current symptoms: Improved epigastric pain   Since onset symptoms are: Unchanged  Modifying factors: She is on PPI and H2 blockers. Stopped taking Aleeve and has not taken it for some time.   Treatments tried: tried sucralfate but gave her diarrhea. Discontinued.   Associated symptoms: Denies any.

## 2020-12-01 ENCOUNTER — OFFICE VISIT (OUTPATIENT)
Dept: MEDICAL GROUP | Facility: PHYSICIAN GROUP | Age: 65
End: 2020-12-01
Payer: MEDICARE

## 2020-12-01 VITALS
WEIGHT: 153.6 LBS | BODY MASS INDEX: 25.59 KG/M2 | RESPIRATION RATE: 16 BRPM | HEART RATE: 72 BPM | DIASTOLIC BLOOD PRESSURE: 68 MMHG | OXYGEN SATURATION: 96 % | HEIGHT: 65 IN | SYSTOLIC BLOOD PRESSURE: 112 MMHG | TEMPERATURE: 99.5 F

## 2020-12-01 DIAGNOSIS — S89.91XA INJURY OF RIGHT KNEE, INITIAL ENCOUNTER: ICD-10-CM

## 2020-12-01 PROCEDURE — 99214 OFFICE O/P EST MOD 30 MIN: CPT | Performed by: FAMILY MEDICINE

## 2020-12-01 RX ORDER — MELOXICAM 7.5 MG/1
7.5 TABLET ORAL DAILY
Qty: 30 TAB | Refills: 0 | Status: SHIPPED | OUTPATIENT
Start: 2020-12-01 | End: 2021-02-12

## 2020-12-01 ASSESSMENT — FIBROSIS 4 INDEX: FIB4 SCORE: 0.88

## 2020-12-01 NOTE — PROGRESS NOTES
Subjective:     Chief Complaint   Patient presents with   • Knee Pain     rt injury 3+ weeks        HPI:   Radha presents today to discuss the following.    Injury of right knee  This is a new condition.  Onset:  right knee injury 3 weeks ago  Current symptoms: right knee cap has been hurting with bruising to the knee cap.   Since onset symptoms are: Unchanged  Modifying factors: walking dog and ran out toward another dog and she landed on her right knee. Hurts to squat.   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 30 Tab 0   • FLUoxetine (PROZAC) 20 MG Cap Take 1 Cap by mouth every day. 90 Cap 3   • famotidine (PEPCID) 20 MG Tab Take 2 Tabs by mouth every day. 180 Tab 3   • rabeprazole (ACIPHEX) 20 MG tablet Take 20 mg by mouth every day.     • Diphenhydramine-APAP, sleep, (TYLENOL PM EXTRA STRENGTH)  MG Tab Take 2 Tabs by mouth every bedtime.     • fexofenadine (ALLEGRA) 180 MG tablet Take 180 mg by mouth every bedtime.     • Ferrous Fumarate 325 (106 FE) MG TABS Take 1 Tab by mouth every bedtime.       No current Epic-ordered facility-administered medications on file.        Allergies:  Iodine, Sulfa drugs, and Other environmental    Health Maintenance: Completed    ROS:  Gen: no fevers/chills, no changes in weight  Eyes: no changes in vision  ENT: no  "sore throat, no hearing loss, no bloody nose  Pulm: no sob, no cough  CV: no chest pain, no palpitations  GI: no nausea/vomiting, no diarrhea  : no dysuria  MSk: no myalgias      Objective:     Exam:  /68 (BP Location: Left arm, Patient Position: Sitting, BP Cuff Size: Adult)   Pulse 72   Temp 37.5 °C (99.5 °F) (Temporal)   Resp 16   Ht 1.651 m (5' 5\")   Wt 69.7 kg (153 lb 9.6 oz)   SpO2 96%   BMI 25.56 kg/m²  Body mass index is 25.56 kg/m².    Constitutional: Alert, no distress, well-groomed.  Skin: Warm, dry, good turgor, no rashes in visible areas.  Eye: Equal, round and reactive, conjunctiva clear, lids normal.  ENMT: Lips without lesions, good dentition, moist mucous membranes.  Neck: Trachea midline, no masses, no thyromegaly.  Respiratory: Unlabored respiratory effort, no cough.  MSK: Normal gait, moves all extremities.  Right knee: There is anterior kneecap bruising/scar tissue formation.  Negative anterior/posterior drawer test.  Negative Donnell test.  No pain elicited on varus/valgus stress test.  Neuro: Grossly non-focal.   Psych: Alert and oriented x3, normal affect and mood.      Assessment & Plan:     65 y.o. female with the following -     1. Injury of right knee, initial encounter  This is a new problem.  The patient is status post fall onto her right knee 3 weeks ago with persistent pain.  There was no twisting injury.  I am concerned about her kneecap and would like to rule out fracture.  We will pursue x-rays today.  In the meantime I recommend RICE therapy along with meloxicam.  Return precautions were given today.  - meloxicam (MOBIC) 7.5 MG Tab; Take 1 Tab by mouth every day.  Dispense: 30 Tab; Refill: 0  - DX-KNEE 3 VIEWS RIGHT; Future      Return if symptoms worsen or fail to improve.    Please note that this dictation was created using voice recognition software. I have made every reasonable attempt to correct obvious errors, but I expect that there are errors of grammar and " possibly content that I did not discover before finalizing the note.

## 2020-12-01 NOTE — ASSESSMENT & PLAN NOTE
This is a new condition.  Onset:  right knee injury 3 weeks ago  Current symptoms: right knee cap has been hurting with bruising to the knee cap.   Since onset symptoms are: Unchanged  Modifying factors: walking dog and ran out toward another dog and she landed on her right knee. Hurts to squat.   Treatments tried: OTC meds   Associated symptoms: Denies any

## 2020-12-02 ENCOUNTER — HOSPITAL ENCOUNTER (OUTPATIENT)
Dept: RADIOLOGY | Facility: MEDICAL CENTER | Age: 65
End: 2020-12-02
Attending: FAMILY MEDICINE
Payer: MEDICARE

## 2020-12-02 DIAGNOSIS — S89.91XA INJURY OF RIGHT KNEE, INITIAL ENCOUNTER: ICD-10-CM

## 2020-12-02 PROCEDURE — 73562 X-RAY EXAM OF KNEE 3: CPT | Mod: RT

## 2020-12-23 RX ORDER — FAMOTIDINE 20 MG/1
40 TABLET, FILM COATED ORAL DAILY
Qty: 60 TAB | Refills: 0 | Status: SHIPPED | OUTPATIENT
Start: 2020-12-23 | End: 2021-06-21

## 2021-02-12 ENCOUNTER — OFFICE VISIT (OUTPATIENT)
Dept: MEDICAL GROUP | Facility: PHYSICIAN GROUP | Age: 66
End: 2021-02-12
Payer: MEDICARE

## 2021-02-12 ENCOUNTER — OFFICE VISIT (OUTPATIENT)
Dept: URGENT CARE | Facility: CLINIC | Age: 66
End: 2021-02-12
Payer: MEDICARE

## 2021-02-12 ENCOUNTER — APPOINTMENT (OUTPATIENT)
Dept: RADIOLOGY | Facility: IMAGING CENTER | Age: 66
End: 2021-02-12
Attending: NURSE PRACTITIONER
Payer: MEDICARE

## 2021-02-12 VITALS
HEART RATE: 72 BPM | TEMPERATURE: 98.4 F | SYSTOLIC BLOOD PRESSURE: 120 MMHG | HEIGHT: 65 IN | RESPIRATION RATE: 16 BRPM | DIASTOLIC BLOOD PRESSURE: 80 MMHG | OXYGEN SATURATION: 97 % | BODY MASS INDEX: 25.49 KG/M2 | WEIGHT: 153 LBS

## 2021-02-12 VITALS
HEIGHT: 65 IN | HEART RATE: 71 BPM | BODY MASS INDEX: 25.62 KG/M2 | SYSTOLIC BLOOD PRESSURE: 120 MMHG | RESPIRATION RATE: 15 BRPM | DIASTOLIC BLOOD PRESSURE: 80 MMHG | WEIGHT: 153.8 LBS | OXYGEN SATURATION: 96 % | TEMPERATURE: 97.8 F
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
## 2021-02-12 DIAGNOSIS — S89.92XA INJURY OF LEFT KNEE, INITIAL ENCOUNTER: ICD-10-CM

## 2021-02-12 DIAGNOSIS — S62.646A CLOSED NONDISPLACED FRACTURE OF PROXIMAL PHALANX OF RIGHT LITTLE FINGER, INITIAL ENCOUNTER: ICD-10-CM

## 2021-02-12 DIAGNOSIS — G89.29 CHRONIC PAIN OF RIGHT KNEE: ICD-10-CM

## 2021-02-12 DIAGNOSIS — M25.561 CHRONIC PAIN OF RIGHT KNEE: ICD-10-CM

## 2021-02-12 DIAGNOSIS — S62.644A CLOSED NONDISPLACED FRACTURE OF PROXIMAL PHALANX OF RIGHT RING FINGER, INITIAL ENCOUNTER: ICD-10-CM
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
## 2021-02-12 DIAGNOSIS — M79.644 PAIN OF FINGER OF RIGHT HAND: ICD-10-CM

## 2021-02-12 DIAGNOSIS — S80.212A ABRASION, LEFT KNEE, INITIAL ENCOUNTER: ICD-10-CM

## 2021-02-12 PROCEDURE — 99214 OFFICE O/P EST MOD 30 MIN: CPT | Performed by: FAMILY MEDICINE

## 2021-02-12 PROCEDURE — 29125 APPL SHORT ARM SPLINT STATIC: CPT | Performed by: NURSE PRACTITIONER

## 2021-02-12 PROCEDURE — 73130 X-RAY EXAM OF HAND: CPT | Mod: TC,RT | Performed by: NURSE PRACTITIONER

## 2021-02-12 ASSESSMENT — ENCOUNTER SYMPTOMS
EYE PAIN: 0
LOSS OF CONSCIOUSNESS: 0
CHILLS: 0
ABDOMINAL PAIN: 0
ORTHOPNEA: 0
MYALGIAS: 0
NERVOUS/ANXIOUS: 0
SORE THROAT: 0
SHORTNESS OF BREATH: 0
FALLS: 1
TINGLING: 0
NAUSEA: 0
WEAKNESS: 0
DIZZINESS: 0
COUGH: 0
HEADACHES: 0
NUMBNESS: 0
VOMITING: 0
FEVER: 0

## 2021-02-12 ASSESSMENT — FIBROSIS 4 INDEX
FIB4 SCORE: 0.88
FIB4 SCORE: 0.88

## 2021-02-12 ASSESSMENT — PATIENT HEALTH QUESTIONNAIRE - PHQ9: CLINICAL INTERPRETATION OF PHQ2 SCORE: 0

## 2021-02-12 NOTE — ASSESSMENT & PLAN NOTE
This is a new condition.    Symptom onset: fell 1 hr ago  Current symptoms: left knee pain with a scrape. Also having right 5th finger pain. No FOOSH injury. Her pain is worst at her 5th finger pain. No deformity.    Since onset symptoms are: Unchanged  Modifying factors: does have a recurrent hx of falling due to her dog. She tripped while walking her dog today and fell.   Treatments tried: none.   Associated symptoms: no obvious deformity.

## 2021-02-12 NOTE — PROGRESS NOTES
Subjective:     Chief Complaint   Patient presents with   • Fall     1 hour ago, dog tripped her    • Knee Pain   • Finger Pain       HPI:   Radha presents today to discuss the following.      Fall  This is a new condition.    Symptom onset: fell 1 hr ago  Current symptoms: left knee pain with a scrape. Also having right 5th finger pain. No FOOSH injury. Her pain is worst at her 5th finger pain. No deformity.    Since onset symptoms are: Unchanged  Modifying factors: does have a recurrent hx of falling due to her dog. She tripped while walking her dog today and fell.   Treatments tried: none.   Associated symptoms: no obvious deformity.         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 60 Tab 0   • FLUoxetine (PROZAC) 20 MG Cap Take 1 Cap by mouth every day. 90 Cap 3   • rabeprazole (ACIPHEX) 20 MG tablet Take 20 mg by mouth every day.     • Diphenhydramine-APAP, sleep, (TYLENOL PM EXTRA STRENGTH)  MG Tab Take 2 Tabs by mouth every bedtime.     • fexofenadine (ALLEGRA) 180 MG tablet Take 180 mg by mouth every bedtime.     • Ferrous Fumarate 325 (106 FE) MG TABS Take 1 Tab by mouth every bedtime.       No current Epic-ordered facility-administered medications on file.       Allergies:  Iodine, Sulfa drugs, and Other environmental    Health Maintenance: Completed    ROS:  Gen: no fevers/chills, no changes in  "weight  Eyes: no changes in vision  ENT: no sore throat, no hearing loss, no bloody nose  Pulm: no sob, no cough  CV: no chest pain, no palpitations  GI: no nausea/vomiting, no diarrhea  : no dysuria    Objective:     Exam:  /80 (BP Location: Left arm, Patient Position: Sitting, BP Cuff Size: Adult)   Pulse 71   Temp 36.6 °C (97.8 °F) (Temporal)   Resp 15   Ht 1.651 m (5' 5\")   Wt 69.8 kg (153 lb 12.8 oz)   SpO2 96%   BMI 25.59 kg/m²  Body mass index is 25.59 kg/m².    Constitutional: Alert, no distress, well-groomed.  Skin: Warm, dry, good turgor, no rashes in visible areas.  Eye: Equal, round and reactive, conjunctiva clear, lids normal.  ENMT: Lips without lesions, good dentition, moist mucous membranes.  Neck: Trachea midline, no masses, no thyromegaly.  Respiratory: Unlabored respiratory effort, no cough.  MSK: Normal gait, moves all extremities.  Inspection of her left knee shows evidence of a couple of scrapes.  No fluctuance or bruising noted.  No step-offs or deformity appreciated.  Mild swelling noted.  Inspection of her right hand shows evidence of 1/5 finger swelling with no obvious deformity.  Mild scrapes also visualized.  Very tender at the PIP joint.  Limited flexion due to pain.  Neuro: Grossly non-focal.   Psych: Alert and oriented x3, normal affect and mood.      Assessment & Plan:     65 y.o. female with the following -     1. Injury of left knee, initial encounter  2. Fall, initial encounter  This is a new problem.  The patient is status post fall about 1 hour ago while walking her dog.  This is a recurrent history.  Unfortunately she landed on her left knee and right hand.  Denies any FOOSH injury at this time.  However, most of her weight was placed on her right fifth finger.  I am concerned for a fracture.  I recommend that she goes to urgent care for a prompt evaluation including x-rays.  She is amenable to plan.  Return precautions given.  I offered her pain medicine but she " would like to hold off on that.      Return if symptoms worsen or fail to improve.    Please note that this dictation was created using voice recognition software. I have made every reasonable attempt to correct obvious errors, but I expect that there are errors of grammar and possibly content that I did not discover before finalizing the note.

## 2021-02-12 NOTE — PROGRESS NOTES
Subjective:   Radha Schumacher is a 65 y.o. female who presents for Fall (x today, fall, Rt. small fringer and Lt. knee)       Fall  The accident occurred 3 to 6 hours ago. The fall occurred while walking. She fell from a height of 3 to 5 ft. She landed on concrete. The volume of blood lost was minimal. The point of impact was the left knee (right hand). The pain is present in the right hand. The pain is mild. The symptoms are aggravated by movement and pressure on injury. Pertinent negatives include no abdominal pain, fever, headaches, loss of consciousness, nausea, numbness, tingling or vomiting. She has tried nothing for the symptoms.     Pt presents for evaluation of a new problem, reports walking her daughter's dog when the dog got excited and she fell and tripped, landing on her left knee and right hand.  Patient has right fifth finger pain and swelling.  She saw her primary care provider who recommended she follow-up here for an x-ray to rule out a fracture.  Patient also has an abrasion to her left knee, states that the knee is nontender at this time.  Is able to walk in unassisted.  Of note, patient fell approximately 2 months ago on her right knee while walking the dog.  After further discussion and later in the visit, patient states that she still has pain in her right knee with certain positions.    Review of Systems   Constitutional: Negative for chills, fever and malaise/fatigue.   HENT: Negative for congestion and sore throat.    Eyes: Negative for pain.   Respiratory: Negative for cough and shortness of breath.    Cardiovascular: Negative for chest pain, orthopnea and leg swelling.   Gastrointestinal: Negative for abdominal pain, nausea and vomiting.   Genitourinary: Negative for dysuria.   Musculoskeletal: Positive for falls and joint pain. Negative for myalgias.   Skin: Negative for rash.   Neurological: Negative for dizziness, tingling, loss of consciousness, weakness, numbness and headaches.    "  Psychiatric/Behavioral: The patient is not nervous/anxious.    All other systems reviewed and are negative.      MEDS:   Current Outpatient Medications:   •  FLUoxetine (PROZAC) 20 MG Cap, Take 1 Cap by mouth every day., Disp: 90 Cap, Rfl: 3  •  rabeprazole (ACIPHEX) 20 MG tablet, Take 20 mg by mouth every day., Disp: , Rfl:   •  Diphenhydramine-APAP, sleep, (TYLENOL PM EXTRA STRENGTH)  MG Tab, Take 2 Tabs by mouth every bedtime., Disp: , Rfl:   •  fexofenadine (ALLEGRA) 180 MG tablet, Take 180 mg by mouth every bedtime., Disp: , Rfl:   •  Ferrous Fumarate 325 (106 FE) MG TABS, Take 1 Tab by mouth every bedtime., Disp: , Rfl:   •  famotidine (PEPCID) 20 MG Tab, Take 2 Tabs by mouth every day. (Patient not taking: Reported on 2/12/2021), Disp: 60 Tab, Rfl: 0  ALLERGIES:   Allergies   Allergen Reactions   • Iodine Anaphylaxis     IVP dye-anaphylaxis   • Sulfa Drugs Hives and Rash   • Other Environmental      Seasonal hayfever-runny eyes/nose       Patient's PMH, SocHx, SurgHx, FamHx, Drug allergies and medications were reviewed.     Objective:   /80 (BP Location: Left arm, Patient Position: Sitting, BP Cuff Size: Adult)   Pulse 72   Temp 36.9 °C (98.4 °F) (Temporal)   Resp 16   Ht 1.651 m (5' 5\")   Wt 69.4 kg (153 lb)   SpO2 97%   BMI 25.46 kg/m²     Physical Exam  Vitals and nursing note reviewed.   Constitutional:       General: She is awake.      Appearance: Normal appearance. She is well-developed.   HENT:      Head: Normocephalic and atraumatic.      Right Ear: External ear normal.      Left Ear: External ear normal.      Nose: Nose normal.      Mouth/Throat:      Mouth: Mucous membranes are moist.      Pharynx: Oropharynx is clear.   Eyes:      Extraocular Movements: Extraocular movements intact.      Conjunctiva/sclera: Conjunctivae normal.   Cardiovascular:      Rate and Rhythm: Normal rate and regular rhythm.   Pulmonary:      Effort: Pulmonary effort is normal.      Breath sounds: Normal " breath sounds.   Musculoskeletal:      Right hand: Swelling, tenderness and bony tenderness present. Decreased range of motion. Normal capillary refill.      Cervical back: Normal range of motion and neck supple.      Comments: Abrasion as diagrammed on left knee; patient presents on the lateral border of the fourth and fifth fingers.  Minimal bleeding present.   Skin:     General: Skin is warm and dry.      Findings: Abrasion present.          Neurological:      Mental Status: She is alert and oriented to person, place, and time.   Psychiatric:         Mood and Affect: Mood normal.         Behavior: Behavior normal.         Thought Content: Thought content normal.       Narrative & Impression        2/12/2021 2:35 PM     HISTORY/REASON FOR EXAM:  Fall while walking the dog. Right 5th digit pain.        TECHNIQUE/EXAM DESCRIPTION AND NUMBER OF VIEWS:  3 views of the RIGHT hand.     COMPARISON: Right wrist series 5/6/2019     FINDINGS:     There is no focal soft tissue swelling.     There are potential nondisplaced transverse lucencies in the proximal aspect of the right 4th and 5th proximal phalanges. These are best seen on the on the oblique view.     The alignment is maintained.     There is mild degenerative change in the IP joints.     IMPRESSION:     1.  There possible nondisplaced transverse fractures at the base of the right 5th proximal phalanx and possibly 4th proximal phalanx.       Assessment/Plan:   Assessment    1. Closed nondisplaced fracture of proximal phalanx of right little finger, initial encounter    2. Closed nondisplaced fracture of proximal phalanx of right ring finger, initial encounter    3. Abrasion, left knee, initial encounter    4. Pain of finger of right hand  - DX-HAND 3+ RIGHT; Future    5. Fall from ground level  - DX-HAND 3+ RIGHT; Future    6. Chronic pain of right knee    Vital signs stable at today's acute urgent care visit. Reviewed test results that were completed in the  clinic.  Patient placed in ulnar gutter splint by provider.  Encouraged rest, elevation, and OTC NSAIDs as needed.  Will refer urgently to sports medicine for further evaluation and follow-up. Discussed management options (risks, benefits, and alternatives to treatment).     Advised the patient to follow-up with the primary care provider for recheck, reevaluation, and/or consideration of further management if necessary. Return to urgent care with any worsening symptoms or if there is no improvement in their current condition. Red flags discussed and indications to immediately call 911 or present to the Emergency Department.  All questions were encouraged and answered to the patient's satisfaction and understanding, and they agree to the plan of care.     I personally reviewed prior external notes and test results pertinent to today's visit.  I have independently reviewed and interpreted all diagnostics ordered during this urgent care acute visit. Time spent evaluating this patient was a minimum of 30 minutes and includes preparing for visit, counseling/education, exam and evaluation, obtaining history, independent interpretation and ordering lab/test/procedures.      Please note that this dictation was created using voice recognition software. I have made a reasonable attempt to correct obvious errors, but I expect that there are errors of grammar and possibly content that I did not discover before finalizing the note.

## 2021-02-22 ENCOUNTER — OFFICE VISIT (OUTPATIENT)
Dept: MEDICAL GROUP | Facility: CLINIC | Age: 66
End: 2021-02-22
Payer: MEDICARE

## 2021-02-22 VITALS
BODY MASS INDEX: 25.49 KG/M2 | OXYGEN SATURATION: 94 % | SYSTOLIC BLOOD PRESSURE: 118 MMHG | DIASTOLIC BLOOD PRESSURE: 80 MMHG | RESPIRATION RATE: 18 BRPM | HEIGHT: 65 IN | TEMPERATURE: 99.1 F | WEIGHT: 153 LBS | HEART RATE: 82 BPM

## 2021-02-22 DIAGNOSIS — M25.562 ACUTE PAIN OF LEFT KNEE: ICD-10-CM

## 2021-02-22 DIAGNOSIS — S62.646A NONDISPLACED FRACTURE OF PROXIMAL PHALANX OF RIGHT LITTLE FINGER, INITIAL ENCOUNTER FOR CLOSED FRACTURE: ICD-10-CM
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
## 2021-02-22 PROCEDURE — 99203 OFFICE O/P NEW LOW 30 MIN: CPT | Mod: 57 | Performed by: FAMILY MEDICINE

## 2021-02-22 PROCEDURE — 26720 TREAT FINGER FRACTURE EACH: CPT | Mod: F9 | Performed by: FAMILY MEDICINE

## 2021-02-22 ASSESSMENT — ENCOUNTER SYMPTOMS
FEVER: 0
NAUSEA: 0
CHILLS: 0
SHORTNESS OF BREATH: 0
VOMITING: 0
DIZZINESS: 0

## 2021-02-22 ASSESSMENT — FIBROSIS 4 INDEX: FIB4 SCORE: 0.88

## 2021-02-22 NOTE — PROGRESS NOTES
Subjective:      Radha Schumacher is a 65 y.o. female who presents with Finger Injury (Referral from UC/ R 4th & 5th digit injury )     Referred by Mayra Quiles PA-C for evaluation of Right Fourth and Fifth Finger Pain As Well As LEFT Knee Pain    HPI   Wrist injury, February 12, 2021  Ground-level fall onto concrete while walking  Right Fourth and Fifth Finger Pain   At the proximal phalanx of the RIGHT fifth finger at the proximal phalanx along the region of the MCP joint  Finger pain is IMPROVED  Improved with Aleve  Achiness/discomfort at night  Prior history of fifth digit fracture on the RIGHT side when she was in grade school  X-rays performed at urgent care    LEFT Knee Pain  Medial knee  Sharp  Worse with weightbearing  Worse with going up and down stairs  Occasionally feels unstable, particularly with weightbearing slowly on her left leg  No notable swelling    Review of Systems   Constitutional: Negative for chills and fever.   Respiratory: Negative for shortness of breath.    Cardiovascular: Negative for chest pain.   Gastrointestinal: Negative for nausea and vomiting.   Neurological: Negative for dizziness.     PMH:  has a past medical history of Anemia, Arthritis, Heart burn, Indigestion, Pain (11/2018), Psychiatric problem (11/2018), Unspecified hemorrhagic conditions, and Unspecified urinary incontinence.  MEDS:   Current Outpatient Medications:   •  famotidine (PEPCID) 20 MG Tab, Take 2 Tabs by mouth every day. (Patient not taking: Reported on 2/12/2021), Disp: 60 Tab, Rfl: 0  •  FLUoxetine (PROZAC) 20 MG Cap, Take 1 Cap by mouth every day., Disp: 90 Cap, Rfl: 3  •  rabeprazole (ACIPHEX) 20 MG tablet, Take 20 mg by mouth every day., Disp: , Rfl:   •  Diphenhydramine-APAP, sleep, (TYLENOL PM EXTRA STRENGTH)  MG Tab, Take 2 Tabs by mouth every bedtime., Disp: , Rfl:   •  fexofenadine (ALLEGRA) 180 MG tablet, Take 180 mg by mouth every bedtime., Disp: , Rfl:   •  Ferrous Fumarate 325 (106  "FE) MG TABS, Take 1 Tab by mouth every bedtime., Disp: , Rfl:   ALLERGIES:   Allergies   Allergen Reactions   • Iodine Anaphylaxis     IVP dye-anaphylaxis   • Sulfa Drugs Hives and Rash   • Other Environmental      Seasonal hayfever-runny eyes/nose     SURGHX:   Past Surgical History:   Procedure Laterality Date   • SHOULDER DECOMPRESSION ARTHROSCOPIC Left 2018    Procedure: SHOULDER DECOMPRESSION ARTHROSCOPIC - SUBACROMIAL W/LABRAL DEBRIDEMENT;  Surgeon: Anum Santana M.D.;  Location: SURGERY [facility];  Service: Orthopedics   • SHOULDER ARTHROSCOPY W/ ROTATOR CUFF REPAIR  2018    Procedure: SHOULDER ARTHROSCOPY W/ ROTATOR CUFF REPAIR - MARS;  Surgeon: Anum Santana M.D.;  Location: SURGERY [facility];  Service: Orthopedics   • UMBILICAL HERNIA REPAIR  3/28/2011    Performed by WILLIAM GOLD at SURGERY [facility]   • VEIN STRIPPING     • HYSTERECTOMY, TOTAL ABDOMINAL     • PB  DELIVERY ONLY     • TUBAL LIGATION     • PB RENAL SCOPE,BIOPSY     • COLONOSCOPY      x 2   • ENDOSCOPY       SOCHX:  reports that she has never smoked. She has never used smokeless tobacco. She reports current alcohol use. She reports that she does not use drugs.  FH: Family history was reviewed, no pertinent findings to report     Objective:     /80 (BP Location: Left arm, Patient Position: Sitting, BP Cuff Size: Adult)   Pulse 82   Temp 37.3 °C (99.1 °F) (Temporal)   Resp 18   Ht 1.651 m (5' 5\")   Wt 69.4 kg (153 lb)   SpO2 94%   BMI 25.46 kg/m²      Physical Exam        Hand exam    NAD  Alert and oriented    BILATERAL WRIST exam  Range of motion intact  No tenderness along scaphoid, TFCC insertion, distal radius or distal ulna  Tinel's testing is NEGATIVE  The hand is otherwise neurovascularly intact    BILATERAL hand exam  MINIMAL swelling of the RIGHT fourth and fifth fingers with mild ecchymosis  NO deformity  Range of motion of all MCP, DIP and PIP joints " slightly diminished in the RIGHT fifth finger compared to the rest of the hand  Pinky opposition NORMAL  Grind test is NEGATIVE  Collateral ligament testing is NORMAL      No acute distress  Normal respiratory effort  Mildly antalgic gait    Knee exam:  RIGHT KNEE:  Normal alignment  No visual swelling or deformity  Anterior knee nontender  Negative apprehension  No  joint line tenderness medially or laterally  Donnell's testing is negative medially and laterally  Lachman's testing is trace with good endpoint  No laxity to varus and valgus stress  The legs otherwise neurovascularly intact    LEFT KNEE:  Normal alignment  No visual swelling or deformity  POSITIVE mild posterior fullness with tenderness in the popliteal fossa  Anterior knee nontender  Negative apprehension  POSITIVE joint line tenderness medially without tenderness laterally  Donnell's testing is negative medially and laterally  Lachman's testing is trace with good endpoint  No laxity to varus and valgus stress  The legs otherwise neurovascularly intact     Assessment/Plan:        1. Nondisplaced fracture of proximal phalanx of right little finger, initial encounter for closed fracture     2. Acute pain of left knee     3. Fall from ground level       Ground-level fall onto concrete while walking  Right Fourth and Fifth Finger Pain   At the proximal phalanx of the RIGHT fifth finger at the proximal phalanx along the region of the MCP joint    Hinged knee brace for the LEFT knee  Activity as tolerated    RIGHT fifth finger fracture along the fifth proximal phalanx likely real given history of trauma, bony tenderness and slightly decreased range of motion of the finger  Fracture was immobilized with jany taping to the fourth finger     Return in about 2 weeks (around 3/8/2021).  See how her RIGHT fifth finger is doing and her LEFT knee with her knee brace  Consider x-rays of the LEFT knee if symptoms persist or fail to improve             2/12/2021  2:35 PM     HISTORY/REASON FOR EXAM:  Fall while walking the dog. Right 5th digit pain.        TECHNIQUE/EXAM DESCRIPTION AND NUMBER OF VIEWS:  3 views of the RIGHT hand.     COMPARISON: Right wrist series 5/6/2019     FINDINGS:     There is no focal soft tissue swelling.     There are potential nondisplaced transverse lucencies in the proximal aspect of the right 4th and 5th proximal phalanges. These are best seen on the on the oblique view.     The alignment is maintained.     There is mild degenerative change in the IP joints.     IMPRESSION:     1.  There possible nondisplaced transverse fractures at the base of the right 5th proximal phalanx and possibly 4th proximal phalanx.    Interpreted in the office today with the patient    Thank you Mayra Quiles PA-C for allowing me to participate in caring for your patient.

## 2021-03-03 DIAGNOSIS — Z23 NEED FOR VACCINATION: ICD-10-CM

## 2021-03-08 ENCOUNTER — OFFICE VISIT (OUTPATIENT)
Dept: MEDICAL GROUP | Facility: CLINIC | Age: 66
End: 2021-03-08
Payer: MEDICARE

## 2021-03-08 VITALS
WEIGHT: 153 LBS | HEART RATE: 85 BPM | TEMPERATURE: 99 F | BODY MASS INDEX: 25.49 KG/M2 | DIASTOLIC BLOOD PRESSURE: 78 MMHG | HEIGHT: 65 IN | SYSTOLIC BLOOD PRESSURE: 122 MMHG | RESPIRATION RATE: 18 BRPM | OXYGEN SATURATION: 95 %
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
## 2021-03-08 DIAGNOSIS — S62.646A NONDISPLACED FRACTURE OF PROXIMAL PHALANX OF RIGHT LITTLE FINGER, INITIAL ENCOUNTER FOR CLOSED FRACTURE: ICD-10-CM

## 2021-03-08 DIAGNOSIS — M25.562 ACUTE PAIN OF LEFT KNEE: ICD-10-CM

## 2021-03-08 PROCEDURE — 99212 OFFICE O/P EST SF 10 MIN: CPT | Mod: 24 | Performed by: FAMILY MEDICINE

## 2021-03-08 PROCEDURE — 99024 POSTOP FOLLOW-UP VISIT: CPT | Performed by: FAMILY MEDICINE

## 2021-03-08 ASSESSMENT — FIBROSIS 4 INDEX: FIB4 SCORE: 0.88

## 2021-03-08 NOTE — Clinical Note
Robret Nunez,  We just saw Radha in follow-up.  Fortunately she is BETTER.  We are going to have her jany tape for another 2 weeks for her finger and her knee seems to be improving.  She still has some medial joint line tenderness on examination, but that she does not feel it is bad enough to perform an injection.  For now, she has been advised that if she does not see improvement as expected over the next 3 to 4 weeks she can return or follow-up for injection of the knee.  Thanks!  Hope you are well,  L

## 2021-03-08 NOTE — PROGRESS NOTES
"Subjective:      Radha Schumacher is a 65 y.o. female who presents with Finger Injury (Referral from UC/ R 4th & 5th digit injury )     Referred by Mayra Quiles PA-C for evaluation of Right Fourth and Fifth Finger Pain As Well As LEFT Knee Pain    HPI   Wrist injury, February 12, 2021  Ground-level fall onto concrete while walking  Right Fifth Finger Pain   At the proximal phalanx of the RIGHT fifth finger at the proximal phalanx along the region of the MCP joint  Finger still slightly swollen, and MCP joint pain has improved with motion of the hand/finger  jany taping has helped    LEFT Knee Pain  Medial knee pain has IMPROVED  He has stopped using her brace  No notable swelling     Objective:     /78 (BP Location: Left arm, Patient Position: Sitting, BP Cuff Size: Adult)   Pulse 85   Temp 37.2 °C (99 °F) (Temporal)   Resp 18   Ht 1.651 m (5' 5\")   Wt 69.4 kg (153 lb)   SpO2 95%   BMI 25.46 kg/m²     Physical Exam    Hand exam     NAD  Alert and oriented    BILATERAL hand exam  NO swelling of the RIGHT fourth  NO deformity  Range of motion of all MCP, DIP and PIP joints NEAR NORMAL in the RIGHT fifth finger compared to the rest of the hand  Pinky opposition NORMAL  Grind test is NEGATIVE  Collateral ligament testing is NORMAL    LEFT KNEE:  Normal alignment  No visual swelling or deformity  NO popliteal fossa tenderness  Anterior knee nontender  Negative apprehension  POSITIVE joint line tenderness medially without tenderness laterally  The legs otherwise neurovascularly intact     Assessment/Plan:        1. Nondisplaced fracture of proximal phalanx of right little finger, initial encounter for closed fracture     2. Acute pain of left knee     3. Fall from ground level       Ground-level fall onto concrete while walking  Right Fourth and Fifth Finger Pain   At the proximal phalanx of the RIGHT fifth finger at the proximal phalanx along the region of the MCP joint    Hinged knee brace for the " LEFT knee  Activity as tolerated    RIGHT fifth finger fracture along the fifth proximal phalanx likely real given history of trauma, bony tenderness and slightly decreased range of motion of the finger  Fracture was immobilized with buddy taping to the fourth finger    At this point, she seems to be improving beyond expectations  Recommend continuing buddy taping for another 2 weeks    As far as her knee goes, she can return to activity as tolerated.  Recommend she use her knee brace for any aggravating activities.    She can follow-up on an as-needed basis             2/12/2021 2:35 PM     HISTORY/REASON FOR EXAM:  Fall while walking the dog. Right 5th digit pain.        TECHNIQUE/EXAM DESCRIPTION AND NUMBER OF VIEWS:  3 views of the RIGHT hand.     COMPARISON: Right wrist series 5/6/2019     FINDINGS:     There is no focal soft tissue swelling.     There are potential nondisplaced transverse lucencies in the proximal aspect of the right 4th and 5th proximal phalanges. These are best seen on the on the oblique view.     The alignment is maintained.     There is mild degenerative change in the IP joints.     IMPRESSION:     1.  There possible nondisplaced transverse fractures at the base of the right 5th proximal phalanx and possibly 4th proximal phalanx.    Thank you Mayra Quiles PA-C for allowing me to participate in caring for your patient.

## 2021-06-21 ENCOUNTER — OFFICE VISIT (OUTPATIENT)
Dept: URGENT CARE | Facility: CLINIC | Age: 66
End: 2021-06-21
Payer: MEDICARE

## 2021-06-21 VITALS
SYSTOLIC BLOOD PRESSURE: 120 MMHG | RESPIRATION RATE: 14 BRPM | WEIGHT: 156 LBS | DIASTOLIC BLOOD PRESSURE: 74 MMHG | OXYGEN SATURATION: 98 % | HEART RATE: 70 BPM | BODY MASS INDEX: 25.99 KG/M2 | HEIGHT: 65 IN | TEMPERATURE: 98.4 F

## 2021-06-21 DIAGNOSIS — H00.015 HORDEOLUM EXTERNUM OF LEFT LOWER EYELID: ICD-10-CM

## 2021-06-21 PROCEDURE — 99213 OFFICE O/P EST LOW 20 MIN: CPT | Performed by: NURSE PRACTITIONER

## 2021-06-21 RX ORDER — ERYTHROMYCIN 5 MG/G
1 OINTMENT OPHTHALMIC 4 TIMES DAILY
Qty: 3.5 G | Refills: 0 | Status: SHIPPED | OUTPATIENT
Start: 2021-06-21 | End: 2021-06-28

## 2021-06-21 ASSESSMENT — ENCOUNTER SYMPTOMS
HEADACHES: 0
EYE ITCHING: 0
FOREIGN BODY SENSATION: 0
SINUS PAIN: 0
EYE REDNESS: 1
NAUSEA: 0
CHILLS: 0
FEVER: 0
DOUBLE VISION: 0
PHOTOPHOBIA: 0
EYE PAIN: 1
VOMITING: 0
BLURRED VISION: 0
EYE DISCHARGE: 0

## 2021-06-21 ASSESSMENT — VISUAL ACUITY: OU: 1

## 2021-06-21 ASSESSMENT — FIBROSIS 4 INDEX: FIB4 SCORE: 0.88

## 2021-06-21 NOTE — PROGRESS NOTES
"Subjective:      Radha Schumacher is a 65 y.o. female who presents with Stye (Left eye, lower lid. lump, possible stye. Itchy, painful, vision blurry in left eye. No discharge from lump. Onset Saturday. Tx: none.)            Eye Problem   The left eye is affected. This is a new problem. Episode onset: x 2 days. The problem occurs constantly. The problem has been gradually worsening. There was no injury mechanism. The pain is moderate. There is no known exposure to pink eye. She does not wear contacts. Associated symptoms include eye redness. Pertinent negatives include no blurred vision, eye discharge, double vision, fever, foreign body sensation, itching, nausea, photophobia, recent URI or vomiting. She has tried nothing for the symptoms. The treatment provided no relief.       Review of Systems   Constitutional: Negative for chills and fever.   HENT: Negative for congestion and sinus pain.    Eyes: Positive for pain and redness. Negative for blurred vision, double vision, photophobia, discharge and itching.   Gastrointestinal: Negative for nausea and vomiting.   Neurological: Negative for headaches.          Objective:     /74 (BP Location: Left arm, Patient Position: Sitting, BP Cuff Size: Adult)   Pulse 70   Temp 36.9 °C (98.4 °F) (Temporal)   Resp 14   Ht 1.651 m (5' 5\")   Wt 70.8 kg (156 lb)   SpO2 98%   BMI 25.96 kg/m²      Physical Exam  Vitals reviewed.   Constitutional:       Appearance: Normal appearance.   HENT:      Head: Normocephalic.      Right Ear: External ear normal.      Left Ear: External ear normal.      Nose: Nose normal. No congestion.      Mouth/Throat:      Mouth: Mucous membranes are moist.   Eyes:      General: Lids are everted, no foreign bodies appreciated. Vision grossly intact. Gaze aligned appropriately. No visual field deficit.        Left eye: Hordeolum present.No foreign body or discharge.      Extraocular Movements: Extraocular movements intact.      Right eye: " Normal extraocular motion and no nystagmus.      Left eye: Normal extraocular motion and no nystagmus.      Conjunctiva/sclera: Conjunctivae normal.      Left eye: Left conjunctiva is not injected. No chemosis, exudate or hemorrhage.    Cardiovascular:      Rate and Rhythm: Normal rate and regular rhythm.      Pulses: Normal pulses.      Heart sounds: Normal heart sounds. No murmur heard.     Pulmonary:      Effort: Pulmonary effort is normal.      Breath sounds: Normal breath sounds. No wheezing.   Abdominal:      General: Abdomen is flat. Bowel sounds are normal.      Palpations: Abdomen is soft.      Tenderness: There is no abdominal tenderness.   Musculoskeletal:         General: Normal range of motion.      Cervical back: Normal range of motion.   Lymphadenopathy:      Cervical: No cervical adenopathy.   Skin:     General: Skin is warm and dry.      Capillary Refill: Capillary refill takes less than 2 seconds.   Neurological:      Mental Status: She is alert and oriented to person, place, and time.   Psychiatric:         Mood and Affect: Mood normal.         Behavior: Behavior normal.                        Assessment/Plan:        1. Hordeolum externum of left lower eyelid  erythromycin 5 MG/GM Ointment     Warm compresses.    Supportive care, differential diagnoses, and indications for immediate follow-up discussed with patient.    Pathogenesis of diagnosis discussed including typical length and natural progression. Patient expresses understanding and agrees to plan.     Instructed patient to follow up with PCP or return to clinic for worsening symptoms or symptoms that persist for 7 to 10 days     Please note that this dictation was created using voice recognition software. I have made every reasonable attempt to correct obvious errors, but I expect that there are errors of grammar and possibly content that I did not discover before finalizing the note.

## 2021-08-09 ENCOUNTER — PATIENT MESSAGE (OUTPATIENT)
Dept: HEALTH INFORMATION MANAGEMENT | Facility: OTHER | Age: 66
End: 2021-08-09

## 2021-08-24 ENCOUNTER — TELEPHONE (OUTPATIENT)
Dept: HEALTH INFORMATION MANAGEMENT | Facility: OTHER | Age: 66
End: 2021-08-24

## 2021-08-24 NOTE — TELEPHONE ENCOUNTER
Outcome: Received Mychart. Scheduled ZE     Please transfer to Patient Outreach Team at [phone] when patient returns call.      [facility] Verified: yes    Attempt # 2

## 2021-09-07 NOTE — PROCEDURE: LIQUID NITROGEN
Render Post-Care Instructions In Note?: yes
Number Of Freeze-Thaw Cycles: 2 freeze-thaw cycles
Detail Level: Detailed
Consent: The patient's consent was obtained including but not limited to risks of crusting, scabbing, blistering, scarring, darker or lighter pigmentary change, recurrence, incomplete removal and infection. RTC in 2 months if lesion(s) persistent.
Post-Care Instructions: I reviewed with the patient in detail post-care instructions. Patient is to wear sunprotection, and avoid picking at any of the treated lesions. Pt may apply Vaseline to crusted or scabbing areas.
Duration Of Freeze Thaw-Cycle (Seconds): 10
no

## 2021-09-17 ENCOUNTER — OFFICE VISIT (OUTPATIENT)
Dept: MEDICAL GROUP | Facility: PHYSICIAN GROUP | Age: 66
End: 2021-09-17
Payer: MEDICARE

## 2021-09-17 VITALS
HEART RATE: 81 BPM | BODY MASS INDEX: 26.06 KG/M2 | WEIGHT: 156.4 LBS | HEIGHT: 65 IN | TEMPERATURE: 99 F | SYSTOLIC BLOOD PRESSURE: 118 MMHG | DIASTOLIC BLOOD PRESSURE: 78 MMHG | OXYGEN SATURATION: 95 %

## 2021-09-17 DIAGNOSIS — H00.015 HORDEOLUM EXTERNUM OF LEFT LOWER EYELID: ICD-10-CM

## 2021-09-17 DIAGNOSIS — Z12.83 SKIN CANCER SCREENING: ICD-10-CM

## 2021-09-17 DIAGNOSIS — J30.2 SEASONAL ALLERGIES: ICD-10-CM

## 2021-09-17 DIAGNOSIS — I83.93 VARICOSE VEINS OF BOTH LOWER EXTREMITIES, UNSPECIFIED WHETHER COMPLICATED: ICD-10-CM

## 2021-09-17 PROBLEM — T78.40XA ALLERGIES: Status: ACTIVE | Noted: 2021-09-17

## 2021-09-17 PROCEDURE — 99214 OFFICE O/P EST MOD 30 MIN: CPT | Performed by: FAMILY MEDICINE

## 2021-09-17 RX ORDER — AZELASTINE 1 MG/ML
1 SPRAY, METERED NASAL 2 TIMES DAILY
Qty: 30 ML | Refills: 3 | Status: SHIPPED | OUTPATIENT
Start: 2021-09-17 | End: 2021-10-19

## 2021-09-17 RX ORDER — FAMOTIDINE 40 MG/1
40 TABLET, FILM COATED ORAL
COMMUNITY
Start: 2021-08-21

## 2021-09-17 ASSESSMENT — FIBROSIS 4 INDEX: FIB4 SCORE: 0.89

## 2021-09-17 NOTE — ASSESSMENT & PLAN NOTE
This is a chronic condition.  The patient has noted some swelling of the lower extremities and also around her right knee.  She is always on her feet and endorses prolonged standing at work.

## 2021-09-17 NOTE — PROGRESS NOTES
Subjective:     Chief Complaint   Patient presents with   • Annual Exam       HPI:   Radha presents today to discuss the following.    Seasonal allergies  Chronic issue  Has had environmental allergies  Has had dry cough  Feels the postnasal drip  Takes allegra and some flonase     Hordeolum externum of left lower eyelid  Chronic issue  Went to  6/21 for this and diagnosed with stye  Recommended erythromycin and warm compresses  Has had a residual spot on her eyelid  Sometimes she feels a little itchy in this area    Varicose veins of both lower extremities  This is a chronic condition.  The patient has noted some swelling of the lower extremities and also around her right knee.  She is always on her feet and endorses prolonged standing at work.      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     • azelastine (ASTELIN) 137 MCG/SPRAY nasal spray Administer 1 Spray into affected nostril(S) 2 times a day. 30 mL 3   • FLUoxetine (PROZAC) 20 MG Cap Take 1 Cap by mouth every day. 90 Cap 3   • rabeprazole (ACIPHEX) 20 MG tablet Take 20 mg by mouth every day.     • Diphenhydramine-APAP, sleep, (TYLENOL PM EXTRA STRENGTH)  MG Tab Take 2 Tabs by mouth every bedtime.     • fexofenadine (ALLEGRA) 180 MG tablet Take 180 mg by mouth every bedtime.     • Ferrous Fumarate 325 (106 FE) MG TABS Take 1 Tab by mouth every bedtime.       No current Epic-ordered facility-administered medications on file.       Allergies:  Iodine, Sulfa drugs, and Other environmental    Health Maintenance: Completed    ROS:  Gen: no fevers/chills, no  "changes in weight  Eyes: no changes in vision  Pulm: no sob, no cough  CV: no chest pain, no palpitations  GI: no nausea/vomiting, no diarrhea      Objective:     Exam:  /78 (BP Location: Right arm, Patient Position: Sitting, BP Cuff Size: Adult)   Pulse 81   Temp 37.2 °C (99 °F) (Temporal)   Ht 1.651 m (5' 5\")   Wt 70.9 kg (156 lb 6.4 oz)   SpO2 95%   BMI 26.03 kg/m²  Body mass index is 26.03 kg/m².          Constitutional: Alert, no distress, well-groomed.  Skin: Warm, dry, good turgor, no rashes in visible areas.  Eye: Inspection of the left lower eyelid shows an individual spot of redness.  Under the eyelid there appears to be a 1 mm hypopigmented region.  Equal, round and reactive, conjunctiva clear, lids normal.  ENMT: Lips without lesions, good dentition, moist mucous membranes.  Neck: Trachea midline, no masses, no thyromegaly.  Respiratory: Unlabored respiratory effort, no cough.  MSK: Normal gait, moves all extremities.  Neuro: Grossly non-focal.   Psych: Alert and oriented x3, normal affect and mood.        Assessment & Plan:     66 y.o. female with the following -     1. Seasonal allergies  This is a chronic, unchanged condition.  The patient endorses postnasal drip.  We will do a trial of azelastine today.  - azelastine (ASTELIN) 137 MCG/SPRAY nasal spray; Administer 1 Spray into affected nostril(S) 2 times a day.  Dispense: 30 mL; Refill: 3    2. Skin cancer screening  - REFERRAL TO DERMATOLOGY    3. Varicose veins of both lower extremities, unspecified whether complicated  Chronic, stable condition.  Has had some lower extremity swelling and also right knee swelling likely secondary to varicose veins.  I recommend compression stockings at this time.    4. Hordeolum externum of left lower eyelid  Chronic condition.  Appears to have persistent blocked duct.  No obvious infection going on at this time.  I do recommend she follows up with her ophthalmologist to go over this.  In the meantime I " recommend repeating warm compresses and monitor for improvement.      Return in about 4 weeks (around 10/15/2021) for PHYSICAL.    Please note that this dictation was created using voice recognition software. I have made every reasonable attempt to correct obvious errors, but I expect that there are errors of grammar and possibly content that I did not discover before finalizing the note.

## 2021-09-17 NOTE — ASSESSMENT & PLAN NOTE
Chronic issue  Has had environmental allergies  Has had dry cough  Feels the postnasal drip  Takes allegra and some flonase

## 2021-09-17 NOTE — ASSESSMENT & PLAN NOTE
Chronic issue  Went to UC 6/21 for this and diagnosed with stye  Recommended erythromycin and warm compresses  Has had a residual spot on her eyelid  Sometimes she feels a little itchy in this area

## 2021-09-20 DIAGNOSIS — F41.9 ANXIETY: ICD-10-CM

## 2021-09-20 RX ORDER — FLUOXETINE HYDROCHLORIDE 20 MG/1
20 CAPSULE ORAL DAILY
Qty: 90 CAPSULE | Refills: 3 | Status: SHIPPED | OUTPATIENT
Start: 2021-09-20 | End: 2022-09-14 | Stop reason: SDUPTHER

## 2021-10-19 ENCOUNTER — OFFICE VISIT (OUTPATIENT)
Dept: MEDICAL GROUP | Facility: PHYSICIAN GROUP | Age: 66
End: 2021-10-19
Payer: MEDICARE

## 2021-10-19 VITALS
TEMPERATURE: 98.2 F | WEIGHT: 156.2 LBS | HEIGHT: 65 IN | DIASTOLIC BLOOD PRESSURE: 82 MMHG | SYSTOLIC BLOOD PRESSURE: 122 MMHG | OXYGEN SATURATION: 95 % | BODY MASS INDEX: 26.02 KG/M2 | HEART RATE: 79 BPM

## 2021-10-19 DIAGNOSIS — I83.93 VARICOSE VEINS OF BOTH LOWER EXTREMITIES, UNSPECIFIED WHETHER COMPLICATED: ICD-10-CM

## 2021-10-19 DIAGNOSIS — R09.82 POSTNASAL DRIP: ICD-10-CM

## 2021-10-19 DIAGNOSIS — H61.21 IMPACTED CERUMEN OF RIGHT EAR: ICD-10-CM

## 2021-10-19 DIAGNOSIS — H90.6 MIXED CONDUCTIVE AND SENSORINEURAL HEARING LOSS OF BOTH EARS: ICD-10-CM

## 2021-10-19 PROCEDURE — 69210 REMOVE IMPACTED EAR WAX UNI: CPT | Performed by: FAMILY MEDICINE

## 2021-10-19 PROCEDURE — 99213 OFFICE O/P EST LOW 20 MIN: CPT | Mod: 25 | Performed by: FAMILY MEDICINE

## 2021-10-19 ASSESSMENT — FIBROSIS 4 INDEX: FIB4 SCORE: 0.89

## 2021-10-19 NOTE — ASSESSMENT & PLAN NOTE
Chronic issue  Has had progressive hearing loss b/l for some years  Feels like it is getting worse

## 2021-10-19 NOTE — ASSESSMENT & PLAN NOTE
Chronic issue  Trial of azelastine spray made things too dry  Now on allegra which helps more  Still feels like things are running in the back of the throat.   Also using flonase

## 2021-10-19 NOTE — ASSESSMENT & PLAN NOTE
Chronic issue  Has had some lower extremity swelling likely due to this.  Trying to use compression stockings which help  Denies any SOB or orthopnea

## 2021-10-19 NOTE — PROGRESS NOTES
Subjective:     Chief Complaint   Patient presents with   • Other     lungs   • Leg Swelling     knees and legs   • Ear Fullness       HPI:   Radha presents today to discuss the following.      Varicose veins of both lower extremities  Chronic issue  Has had some lower extremity swelling likely due to this.  Trying to use compression stockings which help  Denies any SOB or orthopnea    Postnasal drip  Chronic issue  Trial of azelastine spray made things too dry  Now on allegra which helps more  Still feels like things are running in the back of the throat.   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 90 Capsule 3   • famotidine (PEPCID) 40 MG Tab Take 40 mg by mouth.     • rabeprazole (ACIPHEX) 20 MG tablet Take 20 mg by mouth every day.     • Diphenhydramine-APAP, sleep, (TYLENOL PM EXTRA STRENGTH)  MG Tab Take 2 Tabs by mouth every bedtime.     • fexofenadine (ALLEGRA) 180 MG tablet Take 180 mg by mouth every bedtime.     • Ferrous Fumarate 325 (106 FE) MG TABS Take 1 Tab by mouth every bedtime.       No current Epic-ordered facility-administered medications on file.       Allergies:  Iodine, Sulfa drugs, and Other environmental    Health Maintenance: Completed    ROS:  Gen: no fevers/chills, no changes in weight  Eyes: no changes in vision  Pulm: no sob  CV: no chest pain, no palpitations  GI: no nausea/vomiting, no  "diarrhea      Objective:     Exam:  /82 (BP Location: Right arm, Patient Position: Sitting, BP Cuff Size: Adult)   Pulse 79   Temp 36.8 °C (98.2 °F) (Temporal)   Ht 1.651 m (5' 5\")   Wt 70.9 kg (156 lb 3.2 oz)   SpO2 95%   BMI 25.99 kg/m²  Body mass index is 25.99 kg/m².    Gen: Alert and oriented, No apparent distress.  HENT: Left tympanic membrane is clear.  There is full cerumen impaction on the right..    Eyes: PERRLA  Lungs: Normal effort, CTA bilaterally, no wheezes, rhonchi, or rales  CV: Regular rate and rhythm. No murmurs, rubs, or gallops.  Abdomen: Soft, nontender, nondistended. Normal bowel sounds. Liver and spleen are not palpable  Ext: No clubbing, cyanosis, edema.  Skin: no rash, lesions or ulcers  Neuro: Moves all extremities.  Psych: AAOx3        Assessment & Plan:     66 y.o. female with the following -     1. Varicose veins of both lower extremities, unspecified whether complicated  Chronic, stable condition.  Patient will try to use compression stockings a little bit more during the winter season.    2. Postnasal drip  Chronic, stable condition.  Azelastine intolerance.  Continues to take Allegra plus Flonase.  We will continue to monitor for now.    3. Mixed conductive and sensorineural hearing loss of both ears  4. Impacted cerumen of right ear  Chronic issue.  Right greater than left.  Physical exam is consistent with cerumen impaction.  As such will proceed with cerumen removal today and continue to monitor her hearing loss.  If symptoms persist will refer to ENT.  See separate procedure note for details.  - Ear Cerumen Removal      Return in about 3 months (around 1/19/2022).    Please note that this dictation was created using voice recognition software. I have made every reasonable attempt to correct obvious errors, but I expect that there are errors of grammar and possibly content that I did not discover before finalizing the note.      "

## 2021-11-22 RX ORDER — NITROFURANTOIN 25; 75 MG/1; MG/1
100 CAPSULE ORAL 2 TIMES DAILY
Qty: 10 CAPSULE | Refills: 0 | Status: SHIPPED | OUTPATIENT
Start: 2021-11-22 | End: 2021-11-27

## 2022-01-03 ENCOUNTER — OFFICE VISIT (OUTPATIENT)
Dept: DERMATOLOGY | Facility: IMAGING CENTER | Age: 67
End: 2022-01-03
Payer: MEDICARE

## 2022-01-03 VITALS — TEMPERATURE: 98.4 F | BODY MASS INDEX: 24.99 KG/M2 | HEIGHT: 65 IN | WEIGHT: 150 LBS

## 2022-01-03 DIAGNOSIS — D18.01 CHERRY ANGIOMA: ICD-10-CM

## 2022-01-03 DIAGNOSIS — D22.9 MULTIPLE NEVI: ICD-10-CM

## 2022-01-03 DIAGNOSIS — L82.1 SEBORRHEIC KERATOSES: ICD-10-CM

## 2022-01-03 DIAGNOSIS — Z12.83 SKIN CANCER SCREENING: ICD-10-CM

## 2022-01-03 DIAGNOSIS — L81.4 LENTIGINES: ICD-10-CM

## 2022-01-03 PROCEDURE — 99203 OFFICE O/P NEW LOW 30 MIN: CPT | Performed by: NURSE PRACTITIONER

## 2022-01-03 ASSESSMENT — FIBROSIS 4 INDEX: FIB4 SCORE: 0.89

## 2022-01-03 NOTE — PROGRESS NOTES
"DERMATOLOGY NOTE  NEW VISIT       Chief complaint: Establish Care (JENNIFER)         HPI/location: Right posterior shoulder, brown papule, recent changes  Time present: 10 years  Painful lesion: No  Itching lesion: No  Enlarging lesion: yes  Anything make it better or worse? No    HPI/location: left inner thigh x 3, white scaly papules  Time present: 3 month  Painful lesion: No  Itching lesion: No  Enlarging lesion: No  Anything make it better or worse? No    History of skin cancer: no  History of precancers/actinic keratoses: Yes, Details: cryo treat at last exam,  2017 approx  History of biopsies:Yes, Details: Benign a[pprox 2012, above RT eyebrown  History of blistering/severe sunburns:Yes, Details: teenager  Family history of skin cancer:No  Family history of atypical moles:No      Allergies   Allergen Reactions   • Iodine Anaphylaxis     IVP dye-anaphylaxis   • Sulfa Drugs Hives and Rash   • Other Environmental      Seasonal hayfever-runny eyes/nose        MEDICATIONS:  Medications relevant to specialty reviewed.     REVIEW OF SYSTEMS:   Positive for skin (see HPI)  Negative for fevers and chills       EXAM:  Temp 36.9 °C (98.4 °F)   Ht 1.651 m (5' 5\")   Wt 68 kg (150 lb)   BMI 24.96 kg/m²   Constitutional: Well-developed, well-nourished, and in no distress.     A total body skin exam was performed excluding the genitals per patient preference and including the following areas: head (including face), neck, chest, abdomen, groin/buttocks, back, bilateral upper extremities, and bilateral lower extremities with the following pertinent findings listed below and/or in assessment/plan.     Several light brown stuck-on waxy papules scattered on the trunk and extremities, flesh colored  -sun exposed skin of trunk and b/l upper and lower extremities with scattered clinically benign light brown reticulated macules all of which were morphologically similar and none of which were suspicious for skin cancer today on " exam  Several scattered 1-3mm bright red macules and thin papules on the trunk and extremities  Multiple medium brown macules scattered over the trunk >> extremities  All with benign-appearing pigment network patterns on dermoscopy        IMPRESSION / PLAN:    1. Seborrheic keratoses  - Benign-appearing nature of lesions discussed. Advised to return to clinic for any new or concerning changes.  Courtesy LN2 to SK on R upper back    2. Lentigines  - Benign-appearing nature of lesions discussed. Advised to return to clinic for any new or concerning changes.      3. Multiple nevi  - Benign-appearing nature of lesions discussed. Advised to return to clinic for any new or concerning changes.  - ABCDE's of melanoma discussed      4. Cherry angioma  - Benign-appearing nature of lesions discussed. Advised to return to clinic for any new or concerning changes.      5. Skin cancer screening  Skin cancer education  - discussed importance of sun protective clothing, eyewear  - discussed importance of daily use of broad spectrum sunscreen with SPF 30 or greater, as well as need for reapplication ~every 2 hours when exposed to UVR  - discussed importance of regular self-exams, ideally once per month, every 12 months exams in clinic  - ABCDE's of melanoma discussed  - patient to bring any new or concerning lesions to my attention  - Patient educational handout provided and reviewed with patient         Please note that this dictation was created using voice recognition software. I have made every reasonable attempt to correct obvious errors, but I expect that there are errors of grammar and possibly content that I did not discover before finalizing the note.      Return to clinic in: Return in about 1 year (around 1/3/2023) for JENNIFER. and as needed for any new or changing skin lesions.

## 2022-06-30 ENCOUNTER — TELEPHONE (OUTPATIENT)
Dept: MEDICAL GROUP | Facility: PHYSICIAN GROUP | Age: 67
End: 2022-06-30
Payer: MEDICARE

## 2022-06-30 NOTE — TELEPHONE ENCOUNTER
VOICEMAIL  1. Caller Name: Deya Schumacher                        Call Back Number: [phone] (home) [phone] (work)      2. Message:  tested positive for COVID and pt is now showing symptoms. Pt is requesting treatment recommendations    3. Patient approves office to leave a detailed voicemail/MyChart message: no

## 2022-06-30 NOTE — TELEPHONE ENCOUNTER
Phone Number Called: [phone] (home) [phone] (work)      Call outcome: Spoke to patient regarding message below.    Message: pt  notified

## 2022-07-21 ENCOUNTER — OFFICE VISIT (OUTPATIENT)
Dept: MEDICAL GROUP | Facility: PHYSICIAN GROUP | Age: 67
End: 2022-07-21
Payer: MEDICARE

## 2022-07-21 ENCOUNTER — HOSPITAL ENCOUNTER (OUTPATIENT)
Dept: LAB | Facility: MEDICAL CENTER | Age: 67
End: 2022-07-21
Attending: FAMILY MEDICINE
Payer: MEDICARE

## 2022-07-21 VITALS
HEART RATE: 76 BPM | RESPIRATION RATE: 16 BRPM | DIASTOLIC BLOOD PRESSURE: 86 MMHG | HEIGHT: 65 IN | OXYGEN SATURATION: 94 % | WEIGHT: 156.2 LBS | TEMPERATURE: 98.8 F | SYSTOLIC BLOOD PRESSURE: 146 MMHG | BODY MASS INDEX: 26.02 KG/M2

## 2022-07-21 DIAGNOSIS — R05.9 COUGH: ICD-10-CM

## 2022-07-21 DIAGNOSIS — R53.83 FATIGUE, UNSPECIFIED TYPE: ICD-10-CM

## 2022-07-21 DIAGNOSIS — R35.0 URINARY FREQUENCY: ICD-10-CM

## 2022-07-21 LAB
ALBUMIN SERPL BCP-MCNC: 4.6 G/DL (ref 3.2–4.9)
ALBUMIN/GLOB SERPL: 1.8 G/DL
ALP SERPL-CCNC: 102 U/L (ref 30–99)
ALT SERPL-CCNC: 18 U/L (ref 2–50)
ANION GAP SERPL CALC-SCNC: 11 MMOL/L (ref 7–16)
APPEARANCE UR: CLEAR
AST SERPL-CCNC: 18 U/L (ref 12–45)
BACTERIA #/AREA URNS HPF: NEGATIVE /HPF
BILIRUB SERPL-MCNC: <0.2 MG/DL (ref 0.1–1.5)
BILIRUB UR QL STRIP.AUTO: NEGATIVE
BUN SERPL-MCNC: 17 MG/DL (ref 8–22)
CALCIUM SERPL-MCNC: 9.8 MG/DL (ref 8.5–10.5)
CHLORIDE SERPL-SCNC: 100 MMOL/L (ref 96–112)
CO2 SERPL-SCNC: 28 MMOL/L (ref 20–33)
COLOR UR: YELLOW
CREAT SERPL-MCNC: 0.79 MG/DL (ref 0.5–1.4)
EPI CELLS #/AREA URNS HPF: NEGATIVE /HPF
ERYTHROCYTE [DISTWIDTH] IN BLOOD BY AUTOMATED COUNT: 42 FL (ref 35.9–50)
GFR SERPLBLD CREATININE-BSD FMLA CKD-EPI: 82 ML/MIN/1.73 M 2
GLOBULIN SER CALC-MCNC: 2.5 G/DL (ref 1.9–3.5)
GLUCOSE SERPL-MCNC: 75 MG/DL (ref 65–99)
GLUCOSE UR STRIP.AUTO-MCNC: NEGATIVE MG/DL
HCT VFR BLD AUTO: 40.2 % (ref 37–47)
HGB BLD-MCNC: 13.6 G/DL (ref 12–16)
KETONES UR STRIP.AUTO-MCNC: NEGATIVE MG/DL
LEUKOCYTE ESTERASE UR QL STRIP.AUTO: NEGATIVE
MCH RBC QN AUTO: 31.3 PG (ref 27–33)
MCHC RBC AUTO-ENTMCNC: 33.8 G/DL (ref 33.6–35)
MCV RBC AUTO: 92.6 FL (ref 81.4–97.8)
MICRO URNS: ABNORMAL
NITRITE UR QL STRIP.AUTO: NEGATIVE
PH UR STRIP.AUTO: 7.5 [PH] (ref 5–8)
PLATELET # BLD AUTO: 333 K/UL (ref 164–446)
PMV BLD AUTO: 10 FL (ref 9–12.9)
POTASSIUM SERPL-SCNC: 4.7 MMOL/L (ref 3.6–5.5)
PROT SERPL-MCNC: 7.1 G/DL (ref 6–8.2)
PROT UR QL STRIP: NEGATIVE MG/DL
RBC # BLD AUTO: 4.34 M/UL (ref 4.2–5.4)
RBC # URNS HPF: NORMAL /HPF
RBC UR QL AUTO: ABNORMAL
SODIUM SERPL-SCNC: 139 MMOL/L (ref 135–145)
SP GR UR STRIP.AUTO: 1.01
TSH SERPL DL<=0.005 MIU/L-ACNC: 1.93 UIU/ML (ref 0.38–5.33)
UROBILINOGEN UR STRIP.AUTO-MCNC: 0.2 MG/DL
WBC # BLD AUTO: 6.7 K/UL (ref 4.8–10.8)
WBC #/AREA URNS HPF: NORMAL /HPF

## 2022-07-21 PROCEDURE — 99214 OFFICE O/P EST MOD 30 MIN: CPT | Performed by: FAMILY MEDICINE

## 2022-07-21 PROCEDURE — 81001 URINALYSIS AUTO W/SCOPE: CPT

## 2022-07-21 PROCEDURE — 36415 COLL VENOUS BLD VENIPUNCTURE: CPT

## 2022-07-21 PROCEDURE — 80053 COMPREHEN METABOLIC PANEL: CPT

## 2022-07-21 PROCEDURE — 84443 ASSAY THYROID STIM HORMONE: CPT

## 2022-07-21 PROCEDURE — 85027 COMPLETE CBC AUTOMATED: CPT

## 2022-07-21 RX ORDER — BENZONATATE 200 MG/1
CAPSULE ORAL
COMMUNITY
Start: 2022-07-03 | End: 2022-07-21

## 2022-07-21 RX ORDER — ONDANSETRON 4 MG/1
TABLET, ORALLY DISINTEGRATING ORAL
COMMUNITY
Start: 2022-07-03 | End: 2022-07-21

## 2022-07-21 RX ORDER — METHYLPREDNISOLONE 4 MG/1
TABLET ORAL
Qty: 21 TABLET | Refills: 0 | Status: SHIPPED | OUTPATIENT
Start: 2022-07-21 | End: 2022-08-22

## 2022-07-21 NOTE — ASSESSMENT & PLAN NOTE
New problem  Persistent cough ever since covid 1 mo ago  Also c/o headache  Also having extreme fatigue

## 2022-07-21 NOTE — PROGRESS NOTES
"Subjective:     Chief Complaint   Patient presents with   • Illness     COVID + 06/19/22, still no sense of taste or smell at this time.       HPI:   Deya presents today to discuss the following.    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 21 Tablet 0   • FLUoxetine (PROZAC) 20 MG Cap Take 1 Capsule by mouth every day. 90 Capsule 3   • famotidine (PEPCID) 40 MG Tab Take 40 mg by mouth.     • rabeprazole (ACIPHEX) 20 MG tablet Take 20 mg by mouth every day.     • diphenhydrAMINE-APAP, sleep,  MG Tab Take 2 Tabs by mouth every bedtime.     • fexofenadine (ALLEGRA) 180 MG tablet Take 180 mg by mouth every bedtime.     • Ferrous Fumarate 325 (106 FE) MG TABS Take 1 Tab by mouth every bedtime.       No current Epic-ordered facility-administered medications on file.       Allergies:  Iodine, Sulfa drugs, and Other environmental    Health Maintenance: Completed    ROS:  Gen: no fevers/chills, no changes in weight  Eyes: no changes in vision  Pulm: no sob, no cough  CV: no chest pain, no palpitations  GI: no nausea/vomiting, no diarrhea      Objective:     Exam:  BP (!) 146/86 (BP Location: Left arm, Patient Position: Sitting, BP Cuff Size: Adult)   Pulse 76   Temp 37.1 °C (98.8 °F) (Temporal)   Resp 16   Ht 1.651 m (5' 5\")   Wt 70.9 kg (156 lb 3.2 oz)   SpO2 94%   BMI 25.99 kg/m²  Body mass index is 25.99 kg/m².      Constitutional: Alert, no distress, " well-groomed.  Skin: Warm, dry, good turgor, no rashes in visible areas.  Eye: Equal, round and reactive, conjunctiva clear, lids normal.  ENMT: Lips without lesions, good dentition, moist mucous membranes.  Neck: Trachea midline, no masses, no thyromegaly.  Respiratory: Unlabored respiratory effort, no cough.  MSK: Normal gait, moves all extremities.  Neuro: Grossly non-focal.   Psych: Alert and oriented x3, normal affect and mood.        Assessment & Plan:     67 y.o. female with the following -     1. Cough  New problem.  Persistent cough after COVID-19 infection.  Will treat with steroids empirically and also chest x-ray for lung evaluation.  - DX-CHEST-2 VIEWS; Future  - methylPREDNISolone (MEDROL DOSEPAK) 4 MG Tablet Therapy Pack; As directed on the packaging label.  Dispense: 21 Tablet; Refill: 0    2. Fatigue, unspecified type  New problem.  We will establish new baseline blood work to evaluate for fatigue.  - CBC WITHOUT DIFFERENTIAL; Future  - TSH WITH REFLEX TO FT4; Future  - Comp Metabolic Panel; Future    3. Urinary frequency  Proceed with UA to evaluate for UTI.  - URINALYSIS,CULTURE IF INDICATED; Future      Return in about 4 weeks (around 8/18/2022).    Please note that this dictation was created using voice recognition software. I have made every reasonable attempt to correct obvious errors, but I expect that there are errors of grammar and possibly content that I did not discover before finalizing the note.

## 2022-07-25 ENCOUNTER — HOSPITAL ENCOUNTER (OUTPATIENT)
Dept: RADIOLOGY | Facility: MEDICAL CENTER | Age: 67
End: 2022-07-25
Attending: FAMILY MEDICINE
Payer: MEDICARE

## 2022-07-25 DIAGNOSIS — R05.9 COUGH: ICD-10-CM

## 2022-07-25 PROCEDURE — 71046 X-RAY EXAM CHEST 2 VIEWS: CPT

## 2022-07-26 ENCOUNTER — TELEPHONE (OUTPATIENT)
Dept: MEDICAL GROUP | Facility: PHYSICIAN GROUP | Age: 67
End: 2022-07-26
Payer: MEDICARE

## 2022-07-26 DIAGNOSIS — R09.81 CHRONIC NASAL CONGESTION: ICD-10-CM

## 2022-08-22 ENCOUNTER — OFFICE VISIT (OUTPATIENT)
Dept: MEDICAL GROUP | Facility: PHYSICIAN GROUP | Age: 67
End: 2022-08-22
Payer: MEDICARE

## 2022-08-22 VITALS
OXYGEN SATURATION: 96 % | BODY MASS INDEX: 26.33 KG/M2 | TEMPERATURE: 98.3 F | WEIGHT: 158 LBS | DIASTOLIC BLOOD PRESSURE: 82 MMHG | SYSTOLIC BLOOD PRESSURE: 136 MMHG | HEART RATE: 72 BPM | HEIGHT: 65 IN

## 2022-08-22 DIAGNOSIS — F41.9 ANXIETY: ICD-10-CM

## 2022-08-22 DIAGNOSIS — R53.83 OTHER FATIGUE: ICD-10-CM

## 2022-08-22 DIAGNOSIS — Z12.31 ENCOUNTER FOR SCREENING MAMMOGRAM FOR BREAST CANCER: ICD-10-CM

## 2022-08-22 PROCEDURE — 99214 OFFICE O/P EST MOD 30 MIN: CPT | Performed by: FAMILY MEDICINE

## 2022-08-22 ASSESSMENT — FIBROSIS 4 INDEX: FIB4 SCORE: 0.85

## 2022-08-22 ASSESSMENT — PATIENT HEALTH QUESTIONNAIRE - PHQ9: CLINICAL INTERPRETATION OF PHQ2 SCORE: 0

## 2022-08-22 NOTE — PROGRESS NOTES
"Subjective:     Chief Complaint   Patient presents with    Results       HPI:   Deya presents today to discuss the following.    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 90 Capsule 3    famotidine (PEPCID) 40 MG Tab Take 40 mg by mouth.      rabeprazole (ACIPHEX) 20 MG tablet Take 20 mg by mouth every day.      diphenhydrAMINE-APAP, sleep,  MG Tab Take 2 Tabs by mouth every bedtime.      fexofenadine (ALLEGRA) 180 MG tablet Take 180 mg by mouth every bedtime.      Ferrous Fumarate 325 (106 FE) MG TABS Take 1 Tab by mouth every bedtime.       No current Epic-ordered facility-administered medications on file.       Allergies:  Iodine, Sulfa drugs, and Other environmental    Health Maintenance: Completed    ROS:  Gen: no fevers/chills, no changes in weight  Eyes: no changes in vision  Pulm: no sob, no cough  CV: no chest pain, no palpitations  GI: no nausea/vomiting, no diarrhea      Objective:     Exam:  /82 (BP Location: Left arm, Patient Position: Sitting, BP Cuff Size: Adult)   Pulse 72   Temp 36.8 °C (98.3 °F) (Temporal)   Ht 1.651 m (5' 5\")   Wt 71.7 kg (158 lb)   SpO2 96%   BMI 26.29 kg/m²  Body mass index is 26.29 kg/m².      Constitutional: Alert, no distress, well-groomed.  Skin: Warm, dry, good turgor, no rashes in visible areas.  Eye: Equal, round and reactive, conjunctiva clear, lids normal.  ENMT: Lips without lesions, good dentition, moist mucous " membranes.  Neck: Trachea midline, no masses, no thyromegaly.  Respiratory: Unlabored respiratory effort, no cough.  MSK: Normal gait, moves all extremities.  Neuro: Grossly non-focal.   Psych: Alert and oriented x3, normal affect and mood.        Assessment & Plan:     67 y.o. female with the following -     1. Other fatigue  Chronic condition.  Unknown etiology.  May be related to COVID-19.  Doing better at this time.  We will continue to monitor.    2. Anxiety  Chronic, stable condition.  Continue with Prozac.    3. Encounter for screening mammogram for breast cancer  - MA-SCREENING MAMMO BILAT W/ IMPLANTS W/CAD; Future      Return in about 3 months (around 11/22/2022).    Please note that this dictation was created using voice recognition software. I have made every reasonable attempt to correct obvious errors, but I expect that there are errors of grammar and possibly content that I did not discover before finalizing the note.

## 2022-09-14 ENCOUNTER — HOSPITAL ENCOUNTER (OUTPATIENT)
Dept: RADIOLOGY | Facility: MEDICAL CENTER | Age: 67
End: 2022-09-14
Attending: FAMILY MEDICINE
Payer: MEDICARE

## 2022-09-14 DIAGNOSIS — Z12.31 VISIT FOR SCREENING MAMMOGRAM: ICD-10-CM

## 2022-09-14 DIAGNOSIS — F41.9 ANXIETY: ICD-10-CM

## 2022-09-14 PROCEDURE — 77063 BREAST TOMOSYNTHESIS BI: CPT

## 2022-09-15 RX ORDER — FLUOXETINE HYDROCHLORIDE 20 MG/1
20 CAPSULE ORAL DAILY
Qty: 90 CAPSULE | Refills: 0 | Status: SHIPPED | OUTPATIENT
Start: 2022-09-15 | End: 2022-12-16 | Stop reason: SDUPTHER

## 2022-11-04 ENCOUNTER — HOSPITAL ENCOUNTER (OUTPATIENT)
Dept: RADIOLOGY | Facility: MEDICAL CENTER | Age: 67
End: 2022-11-04
Attending: INTERNAL MEDICINE
Payer: MEDICARE

## 2022-11-04 DIAGNOSIS — I77.1 STRICTURE OF ARTERY (HCC): ICD-10-CM

## 2022-11-04 DIAGNOSIS — R09.89 ABNORMAL CHEST SOUNDS: ICD-10-CM

## 2022-11-04 PROCEDURE — 93880 EXTRACRANIAL BILAT STUDY: CPT

## 2022-11-04 PROCEDURE — 93880 EXTRACRANIAL BILAT STUDY: CPT | Mod: 26 | Performed by: INTERNAL MEDICINE

## 2022-11-23 ENCOUNTER — OFFICE VISIT (OUTPATIENT)
Dept: MEDICAL GROUP | Facility: PHYSICIAN GROUP | Age: 67
End: 2022-11-23
Payer: MEDICARE

## 2022-11-23 VITALS
OXYGEN SATURATION: 96 % | DIASTOLIC BLOOD PRESSURE: 90 MMHG | BODY MASS INDEX: 25.56 KG/M2 | HEIGHT: 65 IN | HEART RATE: 70 BPM | WEIGHT: 153.4 LBS | SYSTOLIC BLOOD PRESSURE: 150 MMHG | TEMPERATURE: 98.9 F

## 2022-11-23 DIAGNOSIS — F41.9 ANXIETY: ICD-10-CM

## 2022-11-23 DIAGNOSIS — I10 PRIMARY HYPERTENSION: ICD-10-CM

## 2022-11-23 DIAGNOSIS — E61.1 IRON DEFICIENCY: ICD-10-CM

## 2022-11-23 DIAGNOSIS — K21.9 GASTROESOPHAGEAL REFLUX DISEASE WITHOUT ESOPHAGITIS: ICD-10-CM

## 2022-11-23 PROCEDURE — 99214 OFFICE O/P EST MOD 30 MIN: CPT

## 2022-11-23 RX ORDER — OLMESARTAN MEDOXOMIL 20 MG/1
20 TABLET ORAL DAILY
Qty: 30 TABLET | Refills: 1 | Status: SHIPPED | OUTPATIENT
Start: 2022-11-23 | End: 2022-12-16 | Stop reason: SDUPTHER

## 2022-11-23 SDOH — ECONOMIC STABILITY: TRANSPORTATION INSECURITY
IN THE PAST 12 MONTHS, HAS THE LACK OF TRANSPORTATION KEPT YOU FROM MEDICAL APPOINTMENTS OR FROM GETTING MEDICATIONS?: NO

## 2022-11-23 SDOH — HEALTH STABILITY: PHYSICAL HEALTH: ON AVERAGE, HOW MANY DAYS PER WEEK DO YOU ENGAGE IN MODERATE TO STRENUOUS EXERCISE (LIKE A BRISK WALK)?: 0 DAYS

## 2022-11-23 SDOH — ECONOMIC STABILITY: HOUSING INSECURITY
IN THE LAST 12 MONTHS, WAS THERE A TIME WHEN YOU DID NOT HAVE A STEADY PLACE TO SLEEP OR SLEPT IN A SHELTER (INCLUDING NOW)?: NO

## 2022-11-23 SDOH — ECONOMIC STABILITY: INCOME INSECURITY: IN THE LAST 12 MONTHS, WAS THERE A TIME WHEN YOU WERE NOT ABLE TO PAY THE MORTGAGE OR RENT ON TIME?: NO

## 2022-11-23 SDOH — ECONOMIC STABILITY: FOOD INSECURITY: WITHIN THE PAST 12 MONTHS, THE FOOD YOU BOUGHT JUST DIDN'T LAST AND YOU DIDN'T HAVE MONEY TO GET MORE.: NEVER TRUE

## 2022-11-23 SDOH — HEALTH STABILITY: MENTAL HEALTH
STRESS IS WHEN SOMEONE FEELS TENSE, NERVOUS, ANXIOUS, OR CAN'T SLEEP AT NIGHT BECAUSE THEIR MIND IS TROUBLED. HOW STRESSED ARE YOU?: VERY MUCH

## 2022-11-23 SDOH — ECONOMIC STABILITY: TRANSPORTATION INSECURITY
IN THE PAST 12 MONTHS, HAS LACK OF RELIABLE TRANSPORTATION KEPT YOU FROM MEDICAL APPOINTMENTS, MEETINGS, WORK OR FROM GETTING THINGS NEEDED FOR DAILY LIVING?: NO

## 2022-11-23 SDOH — ECONOMIC STABILITY: HOUSING INSECURITY: IN THE LAST 12 MONTHS, HOW MANY PLACES HAVE YOU LIVED?: 1

## 2022-11-23 SDOH — HEALTH STABILITY: PHYSICAL HEALTH: ON AVERAGE, HOW MANY MINUTES DO YOU ENGAGE IN EXERCISE AT THIS LEVEL?: 0 MIN

## 2022-11-23 SDOH — ECONOMIC STABILITY: FOOD INSECURITY: WITHIN THE PAST 12 MONTHS, YOU WORRIED THAT YOUR FOOD WOULD RUN OUT BEFORE YOU GOT MONEY TO BUY MORE.: NEVER TRUE

## 2022-11-23 SDOH — ECONOMIC STABILITY: INCOME INSECURITY: HOW HARD IS IT FOR YOU TO PAY FOR THE VERY BASICS LIKE FOOD, HOUSING, MEDICAL CARE, AND HEATING?: NOT HARD AT ALL

## 2022-11-23 SDOH — ECONOMIC STABILITY: TRANSPORTATION INSECURITY
IN THE PAST 12 MONTHS, HAS LACK OF TRANSPORTATION KEPT YOU FROM MEETINGS, WORK, OR FROM GETTING THINGS NEEDED FOR DAILY LIVING?: NO

## 2022-11-23 ASSESSMENT — LIFESTYLE VARIABLES
HOW OFTEN DO YOU HAVE A DRINK CONTAINING ALCOHOL: 2-3 TIMES A WEEK
HOW OFTEN DO YOU HAVE SIX OR MORE DRINKS ON ONE OCCASION: NEVER
AUDIT-C TOTAL SCORE: 3
SKIP TO QUESTIONS 9-10: 1
HOW MANY STANDARD DRINKS CONTAINING ALCOHOL DO YOU HAVE ON A TYPICAL DAY: 1 OR 2

## 2022-11-23 ASSESSMENT — SOCIAL DETERMINANTS OF HEALTH (SDOH)
HOW OFTEN DO YOU ATTENT MEETINGS OF THE CLUB OR ORGANIZATION YOU BELONG TO?: MORE THAN 4 TIMES PER YEAR
HOW OFTEN DO YOU GET TOGETHER WITH FRIENDS OR RELATIVES?: MORE THAN THREE TIMES A WEEK
DO YOU BELONG TO ANY CLUBS OR ORGANIZATIONS SUCH AS CHURCH GROUPS UNIONS, FRATERNAL OR ATHLETIC GROUPS, OR SCHOOL GROUPS?: YES
DO YOU BELONG TO ANY CLUBS OR ORGANIZATIONS SUCH AS CHURCH GROUPS UNIONS, FRATERNAL OR ATHLETIC GROUPS, OR SCHOOL GROUPS?: YES
WITHIN THE PAST 12 MONTHS, YOU WORRIED THAT YOUR FOOD WOULD RUN OUT BEFORE YOU GOT THE MONEY TO BUY MORE: NEVER TRUE
HOW OFTEN DO YOU ATTEND CHURCH OR RELIGIOUS SERVICES?: MORE THAN 4 TIMES PER YEAR
IN A TYPICAL WEEK, HOW MANY TIMES DO YOU TALK ON THE PHONE WITH FAMILY, FRIENDS, OR NEIGHBORS?: MORE THAN THREE TIMES A WEEK
HOW OFTEN DO YOU ATTEND CHURCH OR RELIGIOUS SERVICES?: MORE THAN 4 TIMES PER YEAR
HOW OFTEN DO YOU HAVE A DRINK CONTAINING ALCOHOL: 2-3 TIMES A WEEK
HOW OFTEN DO YOU GET TOGETHER WITH FRIENDS OR RELATIVES?: MORE THAN THREE TIMES A WEEK
HOW OFTEN DO YOU ATTENT MEETINGS OF THE CLUB OR ORGANIZATION YOU BELONG TO?: MORE THAN 4 TIMES PER YEAR
HOW MANY DRINKS CONTAINING ALCOHOL DO YOU HAVE ON A TYPICAL DAY WHEN YOU ARE DRINKING: 1 OR 2
HOW OFTEN DO YOU HAVE SIX OR MORE DRINKS ON ONE OCCASION: NEVER
HOW HARD IS IT FOR YOU TO PAY FOR THE VERY BASICS LIKE FOOD, HOUSING, MEDICAL CARE, AND HEATING?: NOT HARD AT ALL
IN A TYPICAL WEEK, HOW MANY TIMES DO YOU TALK ON THE PHONE WITH FAMILY, FRIENDS, OR NEIGHBORS?: MORE THAN THREE TIMES A WEEK

## 2022-11-23 ASSESSMENT — ENCOUNTER SYMPTOMS
MYALGIAS: 1
FEVER: 0
DIARRHEA: 1
COUGH: 1
DEPRESSION: 0
ABDOMINAL PAIN: 1
CONSTIPATION: 1
NERVOUS/ANXIOUS: 0
HEARTBURN: 0
SHORTNESS OF BREATH: 1
CHILLS: 0
WEIGHT LOSS: 0

## 2022-11-23 ASSESSMENT — FIBROSIS 4 INDEX: FIB4 SCORE: 0.85

## 2022-11-23 NOTE — ASSESSMENT & PLAN NOTE
Chronic stable condition  -Continue Aciphex 20 mg daily and Pepcid 40 mg daily  -Antireflux measures:  · Eat smaller portions.  · Avoid lying down after meals  · Normalize body weight  · Avoid common reflux triggers such as spicy, greasy foods, peppers, onions.  · Avoid caffeine, carbonation, alcohol, tobacco.  · Raise the head of your bed by placing bricks or phone books between the floor and the legs of the headboard.   · Use OTC medicines such as TUMS, Rolaids, Maalox, Gaviscon, Zantac, Pepcid, Tagamet

## 2022-11-23 NOTE — PROGRESS NOTES
"Subjective:     CC:  Diagnoses of Primary hypertension, Iron deficiency, Gastroesophageal reflux disease without esophagitis, and Anxiety were pertinent to this visit.    HISTORY OF THE PRESENT ILLNESS: Patient is a 67 y.o. female. This pleasant patient is here today to establish care and discuss the following problems:    Problem   Primary Hypertension    Consistently high readings for several months  -Not taking medications for this condition  -Admits to frequent H.A., blurred vision, CP, SOB, myalgias     Iron Deficiency    Chronic, stable.  Taking iron 325 mg daily. Most recent CBC and iron studies show remission.  Ferritin level is elevated.       Anxiety    Chronic condition for which patient takes Prozac 20 mg daily.  She reports she is doing well on this medication without side effects.  She states it controls anxiety very well.  She denies suicidal ideations.     Gerd (Gastroesophageal Reflux Disease)    Chronic stable condition for which patient takes pepcid 40 mg daily and aciphex 20 mg daily.  She denies side effects or exacerbation of this condition.         Health Maintenance: Recommend follow-up visit for health maintenance topics    ROS: Patient reports the following symptoms status post COVID 19 infection  Review of Systems   Constitutional:  Positive for malaise/fatigue. Negative for chills, fever and weight loss.   Respiratory:  Positive for cough and shortness of breath.    Cardiovascular:  Positive for chest pain.   Gastrointestinal:  Positive for abdominal pain, constipation and diarrhea. Negative for heartburn.   Musculoskeletal:  Positive for joint pain and myalgias.   Psychiatric/Behavioral:  Negative for depression. The patient is not nervous/anxious.        Objective:     Exam: BP (!) 150/90 (BP Location: Left arm, Patient Position: Sitting, BP Cuff Size: Adult)   Pulse 70   Temp 37.2 °C (98.9 °F) (Temporal)   Ht 1.651 m (5' 5\")   Wt 69.6 kg (153 lb 6.4 oz)   SpO2 96%  Body mass index " is 25.53 kg/m².    Physical Exam  Constitutional:       General: She is not in acute distress.     Appearance: Normal appearance. She is not ill-appearing or toxic-appearing.   HENT:      Head: Normocephalic.   Eyes:      Conjunctiva/sclera: Conjunctivae normal.   Pulmonary:      Effort: Pulmonary effort is normal.   Skin:     General: Skin is warm and dry.   Neurological:      General: No focal deficit present.      Mental Status: She is alert and oriented to person, place, and time.   Psychiatric:         Mood and Affect: Mood normal.         Behavior: Behavior normal.         Labs: Most recent labs done 10/27/2022 shows CMP within normal limits, iron studies show ferritin high at 10 at 164, lipid panel shows total cholesterol 225, triglycerides 223, HDL 47, LDL high at 138, thyroid panel within normal limits, CBC within normal limits,    Assessment & Plan: Medical Decision Making   67 y.o. female with the following -    Problem List Items Addressed This Visit       GERD (gastroesophageal reflux disease)     Chronic stable condition  -Continue Aciphex 20 mg daily and Pepcid 40 mg daily  -Antireflux measures:  Eat smaller portions.  Avoid lying down after meals  Normalize body weight  Avoid common reflux triggers such as spicy, greasy foods, peppers, onions.  Avoid caffeine, carbonation, alcohol, tobacco.  Raise the head of your bed by placing bricks or phone books between the floor and the legs of the headboard.   Use OTC medicines such as TUMS, Rolaids, Maalox, Gaviscon, Zantac, Pepcid, Tagamet            Anxiety     Chronic condition stable  - Continue Prozac 20 mg daily         Iron deficiency     Chronic stable condition  -Ok to hold iron   -Repeat CBC will be obtained in 6 months         Primary hypertension     Chronic, controlled.  Blood pressure is at goal under 140/90 in the office today.   Medications: Olmesartan 20 mg daily   Recommend home blood pressure monitoring:  - check your blood pressure every  day and put it in a log  - pick a different time each day so we can see how it varies throughout the day  - when you check your blood pressure: make sure you sit quietly for 5-10 minutes beforehand, keep both feet flat on the ground, and make sure you use an arm cuff at heart height    Lifestyle factors to help manage blood pressure:  1) reduce stress with daily activity, consider yoga, or meditation  2) reduce Na to <2000 mg/day-avoid putting extra salt on food, avoid packaged/processed foods, avoid excessive sugar intake  3) Mediterranean diet   4) 30 minutes of cardio and resistance training most days of the week, which you can build up to at least 150 min./week                 Relevant Medications    olmesartan (BENICAR) 20 MG Tab       Differential diagnosis, natural history, supportive care, and indications for immediate follow-up discussed.  Shared decision making approach was utilized, and patient is amendable with plan of care.  Patient understands to return to clinic or go to the emergency department if symptoms worsen. All questions and concerns addressed.      Return in about 4 weeks (around 12/21/2022) for F/U BP.    Please note that this dictation was created using voice recognition software. I have made every reasonable attempt to correct obvious errors, but I expect that there are errors of grammar and possibly content that I did not discover before finalizing the note.

## 2022-11-23 NOTE — ASSESSMENT & PLAN NOTE
Chronic, controlled.  Blood pressure is at goal under 140/90 in the office today.   Medications: Olmesartan 20 mg daily   Recommend home blood pressure monitoring:  - check your blood pressure every day and put it in a log  - pick a different time each day so we can see how it varies throughout the day  - when you check your blood pressure: make sure you sit quietly for 5-10 minutes beforehand, keep both feet flat on the ground, and make sure you use an arm cuff at heart height    Lifestyle factors to help manage blood pressure:  1) reduce stress with daily activity, consider yoga, or meditation  2) reduce Na to <2000 mg/day-avoid putting extra salt on food, avoid packaged/processed foods, avoid excessive sugar intake  3) Mediterranean diet   4) 30 minutes of cardio and resistance training most days of the week, which you can build up to at least 150 min./week

## 2022-12-16 DIAGNOSIS — I10 PRIMARY HYPERTENSION: ICD-10-CM

## 2022-12-16 RX ORDER — OLMESARTAN MEDOXOMIL 20 MG/1
20 TABLET ORAL DAILY
Qty: 100 TABLET | Refills: 1 | Status: SHIPPED | OUTPATIENT
Start: 2022-12-16 | End: 2023-02-23

## 2022-12-28 ENCOUNTER — OFFICE VISIT (OUTPATIENT)
Dept: MEDICAL GROUP | Facility: PHYSICIAN GROUP | Age: 67
End: 2022-12-28
Payer: MEDICARE

## 2022-12-28 ENCOUNTER — HOSPITAL ENCOUNTER (OUTPATIENT)
Facility: MEDICAL CENTER | Age: 67
End: 2022-12-28
Payer: MEDICARE

## 2022-12-28 VITALS
HEART RATE: 68 BPM | TEMPERATURE: 98.5 F | SYSTOLIC BLOOD PRESSURE: 150 MMHG | HEIGHT: 65 IN | DIASTOLIC BLOOD PRESSURE: 90 MMHG | OXYGEN SATURATION: 96 % | BODY MASS INDEX: 25.66 KG/M2 | WEIGHT: 154 LBS

## 2022-12-28 DIAGNOSIS — N39.0 URINARY TRACT INFECTION WITHOUT HEMATURIA, SITE UNSPECIFIED: Primary | ICD-10-CM

## 2022-12-28 DIAGNOSIS — N39.0 URINARY TRACT INFECTION WITHOUT HEMATURIA, SITE UNSPECIFIED: ICD-10-CM

## 2022-12-28 DIAGNOSIS — N30.01 ACUTE CYSTITIS WITH HEMATURIA: ICD-10-CM

## 2022-12-28 LAB
APPEARANCE UR: NORMAL
BILIRUB UR STRIP-MCNC: NORMAL MG/DL
COLOR UR AUTO: NORMAL
GLUCOSE UR STRIP.AUTO-MCNC: NORMAL MG/DL
KETONES UR STRIP.AUTO-MCNC: NORMAL MG/DL
LEUKOCYTE ESTERASE UR QL STRIP.AUTO: NORMAL
NITRITE UR QL STRIP.AUTO: NORMAL
PH UR STRIP.AUTO: 6.5 [PH] (ref 5–8)
PROT UR QL STRIP: NORMAL MG/DL
RBC UR QL AUTO: NORMAL
SP GR UR STRIP.AUTO: 1.01
UROBILINOGEN UR STRIP-MCNC: 0.2 MG/DL

## 2022-12-28 PROCEDURE — 87086 URINE CULTURE/COLONY COUNT: CPT

## 2022-12-28 PROCEDURE — 87077 CULTURE AEROBIC IDENTIFY: CPT

## 2022-12-28 PROCEDURE — 81002 URINALYSIS NONAUTO W/O SCOPE: CPT

## 2022-12-28 PROCEDURE — 99213 OFFICE O/P EST LOW 20 MIN: CPT

## 2022-12-28 PROCEDURE — 87186 SC STD MICRODIL/AGAR DIL: CPT

## 2022-12-28 RX ORDER — CEPHALEXIN 500 MG/1
1000 CAPSULE ORAL EVERY 6 HOURS
Qty: 112 CAPSULE | Refills: 0 | Status: SHIPPED | OUTPATIENT
Start: 2022-12-28 | End: 2023-01-11

## 2022-12-28 ASSESSMENT — FIBROSIS 4 INDEX: FIB4 SCORE: 0.85

## 2022-12-29 NOTE — PROGRESS NOTES
"Chief Complaint   Patient presents with    UTI       HPI:  Symptom onset: 4 days ago   Current symptoms: Painful, urgent, frequent voids. blood noted in urine.  Since onset symptoms are: Unchanged  Treatments tried: OTC antispasm med.  Associated symptoms: Patient has been having chills, nausea, flank pain.  History is  for frequent UTI    ROS:  Denies  vomiting or abdominal pain.     OBJECTIVE:  BP (!) 150/90 (BP Location: Left arm, Patient Position: Sitting, BP Cuff Size: Adult)   Pulse 68   Temp 36.9 °C (98.5 °F) (Temporal)   Ht 1.651 m (5' 5\")   Wt 69.9 kg (154 lb)   SpO2 96%   Gen: Alert, NAD.  Chest: Lungs clear to auscultation, CV RRR.  Abdomen: Soft, tender in suprapubic region. No CVAT. Normal bowel sounds.     Lab Results   Component Value Date    POCCOLOR yl 12/28/2022    POCAPPEAR cloudy 12/28/2022    POCLEUKEST mod 12/28/2022    POCNITRITE neg 12/28/2022    POCUROBILIGE 0.2 12/28/2022    POCPROTEIN negg 12/28/2022    POCURPH 6.5 12/28/2022    POCBLOOD mod 12/28/2022    POCSPGRV 1.015 12/28/2022    POCKETONES neg 12/28/2022    POCBILIRUBIN neg 12/28/2022    POCGLUCUA neg 12/28/2022          ASSESSMENT/PLAN:     1. Urinary tract infection without hematuria, site unspecified          1. Abnormal urine dipstick in office. Urine sent for culture. Start antibiotics.  2. Provided education to drink plenty of fluids, wipe front to back every void and bowel movement.   3. Return to clinic if symptoms not improving within 3-4 days or in case of vomiting, fever, increasing pain.    "

## 2022-12-31 LAB
BACTERIA UR CULT: ABNORMAL
BACTERIA UR CULT: ABNORMAL
SIGNIFICANT IND 70042: ABNORMAL
SITE SITE: ABNORMAL
SOURCE SOURCE: ABNORMAL

## 2023-01-04 ENCOUNTER — OFFICE VISIT (OUTPATIENT)
Dept: DERMATOLOGY | Facility: IMAGING CENTER | Age: 68
End: 2023-01-04
Payer: MEDICARE

## 2023-01-04 DIAGNOSIS — L82.0 INFLAMED SEBORRHEIC KERATOSIS: ICD-10-CM

## 2023-01-04 PROCEDURE — 17110 DESTRUCTION B9 LES UP TO 14: CPT | Performed by: NURSE PRACTITIONER

## 2023-01-04 NOTE — PROGRESS NOTES
DERMATOLOGY NOTE  FOLLOW UP VISIT       Chief complaint: Skin Lesion and Follow-Up  Pt last seen for Laura on 1/3/2022 by Kusum Mehta     HPI/location: Forehead  Time present: Approv 1mo  Painful lesion: No  Itching lesion: Yes  Enlarging lesion: Yes      History of skin cancer: no  History of precancers/actinic keratoses: Yes, Details: cryo treat at last exam,  2017 approx  History of biopsies:Yes, Details: Benign a[pprox 2012, above RT eyebrown  History of blistering/severe sunburns:Yes, Details: teenager  Family history of skin cancer:No  Family history of atypical moles:No      Allergies   Allergen Reactions    Iodine Anaphylaxis     IVP dye-anaphylaxis    Sulfa Drugs Hives and Rash    Other Environmental      Seasonal hayfever-runny eyes/nose        MEDICATIONS:  Medications relevant to specialty reviewed.     REVIEW OF SYSTEMS:   Positive for skin (see HPI)  Negative for fevers and chills       EXAM:  There were no vitals taken for this visit.  Constitutional: Well-developed, well-nourished, and in no distress.     A focused skin exam was performed including the affected areas of the face. Notable findings on exam today listed below and/or in assessment/plan.     ISK R upper forehead    IMPRESSION / PLAN:    1. Inflamed seborrheic keratosis  - Benign-appearing nature of lesions discussed during exam.   CRYOTHERAPY:  Risks (including, but not limited to: skin discoloration, redness, blister, blood blister, recurrence, need for further treatment, infection, scar) and benefits of cryotherapy discussed. Patient verbally agreed to proceed with treatment. 2 cryotherapy freeze thaw cycles of 10 seconds were applied to 1 lesion on R upper forehead with cryac. Patient tolerated procedure well. Aftercare instructions given--no specific care needed unless irritated during healing process, can apply Vaseline with small band-aid if needed.    - Advised to continue to monitor for any return to clinic for new or concerning  changes      Discussed risks associated with LN2, Patient verbalized understanding and agrees with plan regarding the above          Please note that this dictation was created using voice recognition software. I have made every reasonable attempt to correct obvious errors, but I expect that there are errors of grammar and possibly content that I did not discover before finalizing the note.      Return to clinic in: Return for PRN. and as needed for any new or changing skin lesions.

## 2023-02-06 ENCOUNTER — HOSPITAL ENCOUNTER (OUTPATIENT)
Dept: LAB | Facility: MEDICAL CENTER | Age: 68
End: 2023-02-06
Attending: INTERNAL MEDICINE
Payer: MEDICARE

## 2023-02-06 LAB
ALBUMIN SERPL BCP-MCNC: 4.3 G/DL (ref 3.2–4.9)
ALBUMIN/GLOB SERPL: 1.6 G/DL
ALP SERPL-CCNC: 72 U/L (ref 30–99)
ALT SERPL-CCNC: 25 U/L (ref 2–50)
ANION GAP SERPL CALC-SCNC: 11 MMOL/L (ref 7–16)
APPEARANCE UR: CLEAR
AST SERPL-CCNC: 19 U/L (ref 12–45)
BACTERIA #/AREA URNS HPF: NEGATIVE /HPF
BASOPHILS # BLD AUTO: 1.4 % (ref 0–1.8)
BASOPHILS # BLD: 0.07 K/UL (ref 0–0.12)
BILIRUB SERPL-MCNC: 0.2 MG/DL (ref 0.1–1.5)
BILIRUB UR QL STRIP.AUTO: NEGATIVE
BUN SERPL-MCNC: 16 MG/DL (ref 8–22)
CALCIUM ALBUM COR SERPL-MCNC: 9.3 MG/DL (ref 8.5–10.5)
CALCIUM SERPL-MCNC: 9.5 MG/DL (ref 8.5–10.5)
CHLORIDE SERPL-SCNC: 104 MMOL/L (ref 96–112)
CHOLEST SERPL-MCNC: 202 MG/DL (ref 100–199)
CO2 SERPL-SCNC: 27 MMOL/L (ref 20–33)
COLOR UR: YELLOW
CREAT SERPL-MCNC: 0.84 MG/DL (ref 0.5–1.4)
EOSINOPHIL # BLD AUTO: 0.06 K/UL (ref 0–0.51)
EOSINOPHIL NFR BLD: 1.2 % (ref 0–6.9)
EPI CELLS #/AREA URNS HPF: NEGATIVE /HPF
ERYTHROCYTE [DISTWIDTH] IN BLOOD BY AUTOMATED COUNT: 44.3 FL (ref 35.9–50)
FASTING STATUS PATIENT QL REPORTED: NORMAL
GFR SERPLBLD CREATININE-BSD FMLA CKD-EPI: 76 ML/MIN/1.73 M 2
GLOBULIN SER CALC-MCNC: 2.7 G/DL (ref 1.9–3.5)
GLUCOSE SERPL-MCNC: 85 MG/DL (ref 65–99)
GLUCOSE UR STRIP.AUTO-MCNC: NEGATIVE MG/DL
HCT VFR BLD AUTO: 37.1 % (ref 37–47)
HDLC SERPL-MCNC: 37 MG/DL
HGB BLD-MCNC: 11.9 G/DL (ref 12–16)
HYALINE CASTS #/AREA URNS LPF: NORMAL /LPF
IMM GRANULOCYTES # BLD AUTO: 0.02 K/UL (ref 0–0.11)
IMM GRANULOCYTES NFR BLD AUTO: 0.4 % (ref 0–0.9)
KETONES UR STRIP.AUTO-MCNC: NEGATIVE MG/DL
LDLC SERPL CALC-MCNC: 120 MG/DL
LEUKOCYTE ESTERASE UR QL STRIP.AUTO: NEGATIVE
LYMPHOCYTES # BLD AUTO: 2.21 K/UL (ref 1–4.8)
LYMPHOCYTES NFR BLD: 43.1 % (ref 22–41)
MAGNESIUM SERPL-MCNC: 2.1 MG/DL (ref 1.5–2.5)
MCH RBC QN AUTO: 30.4 PG (ref 27–33)
MCHC RBC AUTO-ENTMCNC: 32.1 G/DL (ref 33.6–35)
MCV RBC AUTO: 94.9 FL (ref 81.4–97.8)
MICRO URNS: ABNORMAL
MONOCYTES # BLD AUTO: 0.36 K/UL (ref 0–0.85)
MONOCYTES NFR BLD AUTO: 7 % (ref 0–13.4)
NEUTROPHILS # BLD AUTO: 2.41 K/UL (ref 2–7.15)
NEUTROPHILS NFR BLD: 46.9 % (ref 44–72)
NITRITE UR QL STRIP.AUTO: NEGATIVE
NRBC # BLD AUTO: 0 K/UL
NRBC BLD-RTO: 0 /100 WBC
PH UR STRIP.AUTO: 6.5 [PH] (ref 5–8)
PLATELET # BLD AUTO: 368 K/UL (ref 164–446)
PMV BLD AUTO: 9.8 FL (ref 9–12.9)
POTASSIUM SERPL-SCNC: 4.8 MMOL/L (ref 3.6–5.5)
PROT SERPL-MCNC: 7 G/DL (ref 6–8.2)
PROT UR QL STRIP: NEGATIVE MG/DL
RBC # BLD AUTO: 3.91 M/UL (ref 4.2–5.4)
RBC # URNS HPF: NORMAL /HPF
RBC UR QL AUTO: ABNORMAL
SODIUM SERPL-SCNC: 142 MMOL/L (ref 135–145)
SP GR UR STRIP.AUTO: 1.01
TRIGL SERPL-MCNC: 225 MG/DL (ref 0–149)
TSH SERPL DL<=0.005 MIU/L-ACNC: 2.94 UIU/ML (ref 0.38–5.33)
UROBILINOGEN UR STRIP.AUTO-MCNC: 0.2 MG/DL
WBC # BLD AUTO: 5.1 K/UL (ref 4.8–10.8)
WBC #/AREA URNS HPF: NORMAL /HPF

## 2023-02-06 PROCEDURE — 84443 ASSAY THYROID STIM HORMONE: CPT

## 2023-02-06 PROCEDURE — 85025 COMPLETE CBC W/AUTO DIFF WBC: CPT

## 2023-02-06 PROCEDURE — 80053 COMPREHEN METABOLIC PANEL: CPT

## 2023-02-06 PROCEDURE — 83735 ASSAY OF MAGNESIUM: CPT

## 2023-02-06 PROCEDURE — 36415 COLL VENOUS BLD VENIPUNCTURE: CPT

## 2023-02-06 PROCEDURE — 80061 LIPID PANEL: CPT

## 2023-02-06 PROCEDURE — 81001 URINALYSIS AUTO W/SCOPE: CPT

## 2023-02-23 ENCOUNTER — OFFICE VISIT (OUTPATIENT)
Dept: MEDICAL GROUP | Facility: PHYSICIAN GROUP | Age: 68
End: 2023-02-23
Payer: MEDICARE

## 2023-02-23 VITALS
HEIGHT: 65 IN | WEIGHT: 155.6 LBS | HEART RATE: 72 BPM | OXYGEN SATURATION: 94 % | SYSTOLIC BLOOD PRESSURE: 140 MMHG | BODY MASS INDEX: 25.92 KG/M2 | TEMPERATURE: 98 F | DIASTOLIC BLOOD PRESSURE: 80 MMHG

## 2023-02-23 DIAGNOSIS — Z86.19 HISTORY OF LYME DISEASE: ICD-10-CM

## 2023-02-23 DIAGNOSIS — E78.5 DYSLIPIDEMIA: ICD-10-CM

## 2023-02-23 DIAGNOSIS — I10 PRIMARY HYPERTENSION: ICD-10-CM

## 2023-02-23 PROCEDURE — 99214 OFFICE O/P EST MOD 30 MIN: CPT

## 2023-02-23 RX ORDER — VITAMINS A AND D
CAPSULE ORAL
COMMUNITY

## 2023-02-23 RX ORDER — FEXOFENADINE HCL 180 MG/1
TABLET ORAL
COMMUNITY

## 2023-02-23 RX ORDER — OLMESARTAN MEDOXOMIL 20 MG/1
5 TABLET ORAL DAILY
COMMUNITY
End: 2023-02-23

## 2023-02-23 RX ORDER — OLMESARTAN MEDOXOMIL 5 MG/1
TABLET ORAL
COMMUNITY
Start: 2023-02-13 | End: 2023-07-14 | Stop reason: SDUPTHER

## 2023-02-23 RX ORDER — FLUOXETINE HYDROCHLORIDE 20 MG/1
CAPSULE ORAL
COMMUNITY
End: 2023-02-23

## 2023-02-23 ASSESSMENT — ENCOUNTER SYMPTOMS
DIZZINESS: 0
VOMITING: 0
CONSTIPATION: 0
FEVER: 0
ABDOMINAL PAIN: 0
MYALGIAS: 0
HEADACHES: 0
WEIGHT LOSS: 0
NAUSEA: 0
DIARRHEA: 0
SHORTNESS OF BREATH: 0
BLURRED VISION: 0
COUGH: 0
CHILLS: 0
WEAKNESS: 0

## 2023-02-23 ASSESSMENT — FIBROSIS 4 INDEX: FIB4 SCORE: 0.69

## 2023-02-23 ASSESSMENT — PATIENT HEALTH QUESTIONNAIRE - PHQ9: CLINICAL INTERPRETATION OF PHQ2 SCORE: 0

## 2023-02-24 ENCOUNTER — HOSPITAL ENCOUNTER (OUTPATIENT)
Dept: LAB | Facility: MEDICAL CENTER | Age: 68
End: 2023-02-24
Attending: INTERNAL MEDICINE
Payer: MEDICARE

## 2023-02-24 LAB
ALBUMIN SERPL BCP-MCNC: 4.7 G/DL (ref 3.2–4.9)
ALBUMIN/GLOB SERPL: 2 G/DL
ALP SERPL-CCNC: 77 U/L (ref 30–99)
ALT SERPL-CCNC: 33 U/L (ref 2–50)
ANION GAP SERPL CALC-SCNC: 9 MMOL/L (ref 7–16)
APPEARANCE UR: CLEAR
AST SERPL-CCNC: 37 U/L (ref 12–45)
BACTERIA #/AREA URNS HPF: NEGATIVE /HPF
BASOPHILS # BLD AUTO: 0.8 % (ref 0–1.8)
BASOPHILS # BLD: 0.03 K/UL (ref 0–0.12)
BILIRUB SERPL-MCNC: 0.3 MG/DL (ref 0.1–1.5)
BILIRUB UR QL STRIP.AUTO: NEGATIVE
BUN SERPL-MCNC: 13 MG/DL (ref 8–22)
CALCIUM ALBUM COR SERPL-MCNC: 8.8 MG/DL (ref 8.5–10.5)
CALCIUM SERPL-MCNC: 9.4 MG/DL (ref 8.5–10.5)
CHLORIDE SERPL-SCNC: 107 MMOL/L (ref 96–112)
CHOLEST SERPL-MCNC: 201 MG/DL (ref 100–199)
CO2 SERPL-SCNC: 28 MMOL/L (ref 20–33)
COLOR UR: YELLOW
CREAT SERPL-MCNC: 0.83 MG/DL (ref 0.5–1.4)
EOSINOPHIL # BLD AUTO: 0.06 K/UL (ref 0–0.51)
EOSINOPHIL NFR BLD: 1.6 % (ref 0–6.9)
EPI CELLS #/AREA URNS HPF: NEGATIVE /HPF
ERYTHROCYTE [DISTWIDTH] IN BLOOD BY AUTOMATED COUNT: 44.2 FL (ref 35.9–50)
FASTING STATUS PATIENT QL REPORTED: NORMAL
FERRITIN SERPL-MCNC: 220 NG/ML (ref 10–291)
GFR SERPLBLD CREATININE-BSD FMLA CKD-EPI: 77 ML/MIN/1.73 M 2
GLOBULIN SER CALC-MCNC: 2.4 G/DL (ref 1.9–3.5)
GLUCOSE SERPL-MCNC: 84 MG/DL (ref 65–99)
GLUCOSE UR STRIP.AUTO-MCNC: NEGATIVE MG/DL
HCT VFR BLD AUTO: 37.5 % (ref 37–47)
HDLC SERPL-MCNC: 47 MG/DL
HGB BLD-MCNC: 12.2 G/DL (ref 12–16)
HYALINE CASTS #/AREA URNS LPF: NORMAL /LPF
IMM GRANULOCYTES # BLD AUTO: 0.01 K/UL (ref 0–0.11)
IMM GRANULOCYTES NFR BLD AUTO: 0.3 % (ref 0–0.9)
KETONES UR STRIP.AUTO-MCNC: NEGATIVE MG/DL
LDLC SERPL CALC-MCNC: 126 MG/DL
LEUKOCYTE ESTERASE UR QL STRIP.AUTO: NEGATIVE
LYMPHOCYTES # BLD AUTO: 1.71 K/UL (ref 1–4.8)
LYMPHOCYTES NFR BLD: 44.8 % (ref 22–41)
MAGNESIUM SERPL-MCNC: 2.1 MG/DL (ref 1.5–2.5)
MCH RBC QN AUTO: 30.6 PG (ref 27–33)
MCHC RBC AUTO-ENTMCNC: 32.5 G/DL (ref 33.6–35)
MCV RBC AUTO: 94 FL (ref 81.4–97.8)
MICRO URNS: ABNORMAL
MONOCYTES # BLD AUTO: 0.23 K/UL (ref 0–0.85)
MONOCYTES NFR BLD AUTO: 6 % (ref 0–13.4)
NEUTROPHILS # BLD AUTO: 1.78 K/UL (ref 2–7.15)
NEUTROPHILS NFR BLD: 46.5 % (ref 44–72)
NITRITE UR QL STRIP.AUTO: NEGATIVE
NRBC # BLD AUTO: 0 K/UL
NRBC BLD-RTO: 0 /100 WBC
PH UR STRIP.AUTO: 7 [PH] (ref 5–8)
PLATELET # BLD AUTO: 369 K/UL (ref 164–446)
PMV BLD AUTO: 9.8 FL (ref 9–12.9)
POTASSIUM SERPL-SCNC: 5 MMOL/L (ref 3.6–5.5)
PROT SERPL-MCNC: 7.1 G/DL (ref 6–8.2)
PROT UR QL STRIP: NEGATIVE MG/DL
RBC # BLD AUTO: 3.99 M/UL (ref 4.2–5.4)
RBC # URNS HPF: NORMAL /HPF
RBC UR QL AUTO: ABNORMAL
SODIUM SERPL-SCNC: 144 MMOL/L (ref 135–145)
SP GR UR STRIP.AUTO: 1.01
T3FREE SERPL-MCNC: 2.12 PG/ML (ref 2–4.4)
T4 FREE SERPL-MCNC: 1.01 NG/DL (ref 0.93–1.7)
TRIGL SERPL-MCNC: 142 MG/DL (ref 0–149)
TSH SERPL DL<=0.005 MIU/L-ACNC: 2.47 UIU/ML (ref 0.38–5.33)
UROBILINOGEN UR STRIP.AUTO-MCNC: 0.2 MG/DL
WBC # BLD AUTO: 3.8 K/UL (ref 4.8–10.8)
WBC #/AREA URNS HPF: NORMAL /HPF

## 2023-02-24 PROCEDURE — 83735 ASSAY OF MAGNESIUM: CPT

## 2023-02-24 PROCEDURE — 84443 ASSAY THYROID STIM HORMONE: CPT

## 2023-02-24 PROCEDURE — 84481 FREE ASSAY (FT-3): CPT

## 2023-02-24 PROCEDURE — 80061 LIPID PANEL: CPT

## 2023-02-24 PROCEDURE — 82728 ASSAY OF FERRITIN: CPT

## 2023-02-24 PROCEDURE — 81001 URINALYSIS AUTO W/SCOPE: CPT

## 2023-02-24 PROCEDURE — 84439 ASSAY OF FREE THYROXINE: CPT

## 2023-02-24 PROCEDURE — 85025 COMPLETE CBC W/AUTO DIFF WBC: CPT

## 2023-02-24 PROCEDURE — 36415 COLL VENOUS BLD VENIPUNCTURE: CPT

## 2023-02-24 PROCEDURE — 84482 T3 REVERSE: CPT

## 2023-02-24 PROCEDURE — 80053 COMPREHEN METABOLIC PANEL: CPT

## 2023-02-24 NOTE — PROGRESS NOTES
"Subjective:     CC: BP follow up    HPI:   Deya presents today with    Problem   Dyslipidemia    Does not take medication for this condition  The 10-year ASCVD risk score (Sunny KENNEDY, et al., 2019) is: 12.4%     Latest Reference Range & Units 02/06/23 07:43   Cholesterol,Tot 100 - 199 mg/dL 202 (H)   Triglycerides 0 - 149 mg/dL 225 (H)   HDL >=40 mg/dL 37 !   LDL <100 mg/dL 120 (H)        History of Lyme Disease    First diagnosed 10/2023  -Seeing Dr. Durham (Functional Medicine)  -She has had 15 Lyme Disease treatments for this, last treatment was begging of February  -Currently feeling much better       Primary Hypertension    Was experiencing high readings for several months  -In November I placed her on Olmesartan 20 mg.  She has been now only taking 5 mg daily having much improved BP readings and resolution of HA, blurred,  CP/SOB  -Home readings 110-130/60-80  -BP machine brought to clinic and readings correlate with manual reading.  Reports to have white coat hypertension  -20 mg was to high and BP was dropping low.         Health Maintenance: Declines health maintenance topics at this time as patient is still finalizing treatment for disease.    ROS:  Review of Systems   Constitutional:  Negative for chills, fever, malaise/fatigue and weight loss.   Eyes:  Negative for blurred vision.   Respiratory:  Negative for cough and shortness of breath.    Cardiovascular:  Negative for chest pain.   Gastrointestinal:  Negative for abdominal pain, constipation, diarrhea, nausea and vomiting.   Musculoskeletal:  Negative for myalgias.   Neurological:  Negative for dizziness, weakness and headaches.     Objective:     Exam:  BP (!) 140/80 (BP Location: Left arm, Patient Position: Sitting, BP Cuff Size: Adult)   Pulse 72   Temp 36.7 °C (98 °F) (Temporal)   Ht 1.651 m (5' 5\")   Wt 70.6 kg (155 lb 9.6 oz)   SpO2 94%   BMI 25.89 kg/m²  Body mass index is 25.89 kg/m².    Physical Exam  Constitutional:       General: She " is not in acute distress.     Appearance: Normal appearance. She is not ill-appearing or toxic-appearing.   HENT:      Head: Normocephalic.   Eyes:      Conjunctiva/sclera: Conjunctivae normal.   Pulmonary:      Effort: Pulmonary effort is normal.   Skin:     General: Skin is warm and dry.   Neurological:      General: No focal deficit present.      Mental Status: She is alert and oriented to person, place, and time.   Psychiatric:         Mood and Affect: Mood normal.         Behavior: Behavior normal.       Labs:   Hospital Outpatient Visit on 02/06/2023   Component Date Value    Sodium 02/06/2023 142     Potassium 02/06/2023 4.8     Chloride 02/06/2023 104     Co2 02/06/2023 27     Anion Gap 02/06/2023 11.0     Glucose 02/06/2023 85     Bun 02/06/2023 16     Creatinine 02/06/2023 0.84     Calcium 02/06/2023 9.5     AST(SGOT) 02/06/2023 19     ALT(SGPT) 02/06/2023 25     Alkaline Phosphatase 02/06/2023 72     Total Bilirubin 02/06/2023 0.2     Albumin 02/06/2023 4.3     Total Protein 02/06/2023 7.0     Globulin 02/06/2023 2.7     A-G Ratio 02/06/2023 1.6     WBC 02/06/2023 5.1     RBC 02/06/2023 3.91 (L)     Hemoglobin 02/06/2023 11.9 (L)     Hematocrit 02/06/2023 37.1     MCV 02/06/2023 94.9     MCH 02/06/2023 30.4     MCHC 02/06/2023 32.1 (L)     RDW 02/06/2023 44.3     Platelet Count 02/06/2023 368     MPV 02/06/2023 9.8     Neutrophils-Polys 02/06/2023 46.90     Lymphocytes 02/06/2023 43.10 (H)     Monocytes 02/06/2023 7.00     Eosinophils 02/06/2023 1.20     Basophils 02/06/2023 1.40     Immature Granulocytes 02/06/2023 0.40     Nucleated RBC 02/06/2023 0.00     Neutrophils (Absolute) 02/06/2023 2.41     Lymphs (Absolute) 02/06/2023 2.21     Monos (Absolute) 02/06/2023 0.36     Eos (Absolute) 02/06/2023 0.06     Baso (Absolute) 02/06/2023 0.07     Immature Granulocytes (a* 02/06/2023 0.02     NRBC (Absolute) 02/06/2023 0.00     Cholesterol,Tot 02/06/2023 202 (H)     Triglycerides 02/06/2023 225 (H)     HDL  02/06/2023 37 (A)     LDL 02/06/2023 120 (H)     Fasting Status 02/06/2023 Fasting     Color 02/06/2023 Yellow     Character 02/06/2023 Clear     Specific Gravity 02/06/2023 1.014     Ph 02/06/2023 6.5     Glucose 02/06/2023 Negative     Ketones 02/06/2023 Negative     Protein 02/06/2023 Negative     Bilirubin 02/06/2023 Negative     Urobilinogen, Urine 02/06/2023 0.2     Nitrite 02/06/2023 Negative     Leukocyte Esterase 02/06/2023 Negative     Occult Blood 02/06/2023 Trace (A)     Micro Urine Req 02/06/2023 Microscopic     TSH 02/06/2023 2.940     Magnesium 02/06/2023 2.1     WBC 02/06/2023 0-2     RBC 02/06/2023 0-2     Bacteria 02/06/2023 Negative     Epithelial Cells 02/06/2023 Negative     Hyaline Cast 02/06/2023 0-2     Correct Calcium 02/06/2023 9.3     GFR (CKD-EPI) 02/06/2023 76          Assessment & Plan: Medical Decision Making     67 y.o. female with the following -     Problem List Items Addressed This Visit       Primary hypertension     Chronic condition currently elevated in clinic however controlled with home readings so we will consider this condition stable  -Continue olmesartan 5 mg daily  -Continue home blood pressure monitoring.  She readings become consistently greater than 140/90 patient will notify me and we will just medication if needed         Relevant Medications    olmesartan (BENICAR) 20 MG Tab    Other Relevant Orders    Comp Metabolic Panel    Lipid Profile    Dyslipidemia     Chronic condition uncontrolled  - Given elevated ASCVD risk score statin is recommended.  Patient declines at this time.  She would like to wait 6 more months as she finishes her Lyme disease treatment and reassess levels at that time.  -Recommend diligent efforts towards healthy diet and exercise         Relevant Orders    Comp Metabolic Panel    Lipid Profile    History of Lyme disease     Chronic condition currently in treatment and stabilizing  -Continue to follow with Dr. Durham functional medicine for this  condition  -Continue treatment regimen as scheduled          I spent a total of 31 minutes with record review, exam, communication with the patient, communication with other providers, and documentation of this encounter.      Differential diagnosis, natural history, supportive care, and indications for immediate follow-up discussed.  Shared decision making approach utilized, and patient is amendable with plan of care.  Patient understands to return to clinic or go to the emergency department if symptoms worsen. All questions and concerns addressed.    Return in about 6 months (around 8/23/2023) for F/U Lab Review.    Please note that this dictation was created using voice recognition software. I have made every reasonable attempt to correct obvious errors, but I expect that there are errors of grammar and possibly content that I did not discover before finalizing the note.

## 2023-02-24 NOTE — ASSESSMENT & PLAN NOTE
Chronic condition currently in treatment and stabilizing  -Continue to follow with Dr. Durham functional medicine for this condition  -Continue treatment regimen as scheduled

## 2023-02-24 NOTE — ASSESSMENT & PLAN NOTE
Chronic condition uncontrolled  - Given elevated ASCVD risk score statin is recommended.  Patient declines at this time.  She would like to wait 6 more months as she finishes her Lyme disease treatment and reassess levels at that time.  -Recommend diligent efforts towards healthy diet and exercise

## 2023-02-24 NOTE — ASSESSMENT & PLAN NOTE
Chronic condition currently elevated in clinic however controlled with home readings so we will consider this condition stable  -Continue olmesartan 5 mg daily  -Continue home blood pressure monitoring.  She readings become consistently greater than 140/90 patient will notify me and we will just medication if needed

## 2023-03-02 LAB — T3REVERSE SERPL-MCNC: 9.6 NG/DL (ref 9–27)

## 2023-03-15 ENCOUNTER — HOSPITAL ENCOUNTER (OUTPATIENT)
Dept: LAB | Facility: MEDICAL CENTER | Age: 68
End: 2023-03-15
Attending: INTERNAL MEDICINE
Payer: MEDICARE

## 2023-03-15 LAB
ALBUMIN SERPL BCP-MCNC: 4.5 G/DL (ref 3.2–4.9)
ALBUMIN/GLOB SERPL: 1.7 G/DL
ALP SERPL-CCNC: 89 U/L (ref 30–99)
ALT SERPL-CCNC: 20 U/L (ref 2–50)
ANION GAP SERPL CALC-SCNC: 11 MMOL/L (ref 7–16)
APPEARANCE UR: CLEAR
AST SERPL-CCNC: 19 U/L (ref 12–45)
BACTERIA #/AREA URNS HPF: NEGATIVE /HPF
BASOPHILS # BLD AUTO: 0.8 % (ref 0–1.8)
BASOPHILS # BLD: 0.04 K/UL (ref 0–0.12)
BILIRUB SERPL-MCNC: 0.2 MG/DL (ref 0.1–1.5)
BILIRUB UR QL STRIP.AUTO: NEGATIVE
BUN SERPL-MCNC: 12 MG/DL (ref 8–22)
CALCIUM ALBUM COR SERPL-MCNC: 9 MG/DL (ref 8.5–10.5)
CALCIUM SERPL-MCNC: 9.4 MG/DL (ref 8.5–10.5)
CHLORIDE SERPL-SCNC: 105 MMOL/L (ref 96–112)
CHOLEST SERPL-MCNC: 220 MG/DL (ref 100–199)
CO2 SERPL-SCNC: 26 MMOL/L (ref 20–33)
COLOR UR: YELLOW
CREAT SERPL-MCNC: 0.81 MG/DL (ref 0.5–1.4)
EOSINOPHIL # BLD AUTO: 0.07 K/UL (ref 0–0.51)
EOSINOPHIL NFR BLD: 1.5 % (ref 0–6.9)
EPI CELLS #/AREA URNS HPF: NEGATIVE /HPF
ERYTHROCYTE [DISTWIDTH] IN BLOOD BY AUTOMATED COUNT: 44 FL (ref 35.9–50)
FASTING STATUS PATIENT QL REPORTED: NORMAL
FERRITIN SERPL-MCNC: 122 NG/ML (ref 10–291)
GFR SERPLBLD CREATININE-BSD FMLA CKD-EPI: 79 ML/MIN/1.73 M 2
GLOBULIN SER CALC-MCNC: 2.6 G/DL (ref 1.9–3.5)
GLUCOSE SERPL-MCNC: 93 MG/DL (ref 65–99)
GLUCOSE UR STRIP.AUTO-MCNC: NEGATIVE MG/DL
HCT VFR BLD AUTO: 37.7 % (ref 37–47)
HDLC SERPL-MCNC: 49 MG/DL
HGB BLD-MCNC: 12.5 G/DL (ref 12–16)
HYALINE CASTS #/AREA URNS LPF: NORMAL /LPF
IMM GRANULOCYTES # BLD AUTO: 0.01 K/UL (ref 0–0.11)
IMM GRANULOCYTES NFR BLD AUTO: 0.2 % (ref 0–0.9)
KETONES UR STRIP.AUTO-MCNC: NEGATIVE MG/DL
LDLC SERPL CALC-MCNC: 138 MG/DL
LEUKOCYTE ESTERASE UR QL STRIP.AUTO: NEGATIVE
LYMPHOCYTES # BLD AUTO: 2.07 K/UL (ref 1–4.8)
LYMPHOCYTES NFR BLD: 43.6 % (ref 22–41)
MAGNESIUM SERPL-MCNC: 1.9 MG/DL (ref 1.5–2.5)
MCH RBC QN AUTO: 30.6 PG (ref 27–33)
MCHC RBC AUTO-ENTMCNC: 33.2 G/DL (ref 33.6–35)
MCV RBC AUTO: 92.4 FL (ref 81.4–97.8)
MICRO URNS: ABNORMAL
MONOCYTES # BLD AUTO: 0.35 K/UL (ref 0–0.85)
MONOCYTES NFR BLD AUTO: 7.4 % (ref 0–13.4)
NEUTROPHILS # BLD AUTO: 2.21 K/UL (ref 2–7.15)
NEUTROPHILS NFR BLD: 46.5 % (ref 44–72)
NITRITE UR QL STRIP.AUTO: NEGATIVE
NRBC # BLD AUTO: 0 K/UL
NRBC BLD-RTO: 0 /100 WBC
PH UR STRIP.AUTO: 7 [PH] (ref 5–8)
PLATELET # BLD AUTO: 317 K/UL (ref 164–446)
PMV BLD AUTO: 9.6 FL (ref 9–12.9)
POTASSIUM SERPL-SCNC: 4.3 MMOL/L (ref 3.6–5.5)
PROT SERPL-MCNC: 7.1 G/DL (ref 6–8.2)
PROT UR QL STRIP: NEGATIVE MG/DL
RBC # BLD AUTO: 4.08 M/UL (ref 4.2–5.4)
RBC # URNS HPF: NORMAL /HPF
RBC UR QL AUTO: ABNORMAL
SODIUM SERPL-SCNC: 142 MMOL/L (ref 135–145)
SP GR UR STRIP.AUTO: 1.01
T4 FREE SERPL-MCNC: 0.92 NG/DL (ref 0.93–1.7)
TRIGL SERPL-MCNC: 164 MG/DL (ref 0–149)
TSH SERPL DL<=0.005 MIU/L-ACNC: 3.85 UIU/ML (ref 0.38–5.33)
UROBILINOGEN UR STRIP.AUTO-MCNC: 0.2 MG/DL
WBC # BLD AUTO: 4.8 K/UL (ref 4.8–10.8)
WBC #/AREA URNS HPF: NORMAL /HPF

## 2023-03-15 PROCEDURE — 82728 ASSAY OF FERRITIN: CPT

## 2023-03-15 PROCEDURE — 36415 COLL VENOUS BLD VENIPUNCTURE: CPT

## 2023-03-15 PROCEDURE — 80061 LIPID PANEL: CPT

## 2023-03-15 PROCEDURE — 81001 URINALYSIS AUTO W/SCOPE: CPT

## 2023-03-15 PROCEDURE — 83735 ASSAY OF MAGNESIUM: CPT

## 2023-03-15 PROCEDURE — 85025 COMPLETE CBC W/AUTO DIFF WBC: CPT

## 2023-03-15 PROCEDURE — 80053 COMPREHEN METABOLIC PANEL: CPT

## 2023-03-15 PROCEDURE — 84439 ASSAY OF FREE THYROXINE: CPT

## 2023-03-15 PROCEDURE — 84443 ASSAY THYROID STIM HORMONE: CPT

## 2023-04-06 ENCOUNTER — HOSPITAL ENCOUNTER (OUTPATIENT)
Dept: LAB | Facility: MEDICAL CENTER | Age: 68
End: 2023-04-06
Attending: INTERNAL MEDICINE
Payer: MEDICARE

## 2023-04-06 LAB
BASOPHILS # BLD AUTO: 0.9 % (ref 0–1.8)
BASOPHILS # BLD: 0.04 K/UL (ref 0–0.12)
EOSINOPHIL # BLD AUTO: 0.05 K/UL (ref 0–0.51)
EOSINOPHIL NFR BLD: 1.1 % (ref 0–6.9)
ERYTHROCYTE [DISTWIDTH] IN BLOOD BY AUTOMATED COUNT: 44.3 FL (ref 35.9–50)
HCT VFR BLD AUTO: 40.1 % (ref 37–47)
HGB BLD-MCNC: 13.1 G/DL (ref 12–16)
IMM GRANULOCYTES # BLD AUTO: 0.01 K/UL (ref 0–0.11)
IMM GRANULOCYTES NFR BLD AUTO: 0.2 % (ref 0–0.9)
LYMPHOCYTES # BLD AUTO: 1.63 K/UL (ref 1–4.8)
LYMPHOCYTES NFR BLD: 35.9 % (ref 22–41)
MCH RBC QN AUTO: 30.7 PG (ref 27–33)
MCHC RBC AUTO-ENTMCNC: 32.7 G/DL (ref 33.6–35)
MCV RBC AUTO: 93.9 FL (ref 81.4–97.8)
MONOCYTES # BLD AUTO: 0.33 K/UL (ref 0–0.85)
MONOCYTES NFR BLD AUTO: 7.3 % (ref 0–13.4)
NEUTROPHILS # BLD AUTO: 2.48 K/UL (ref 2–7.15)
NEUTROPHILS NFR BLD: 54.6 % (ref 44–72)
NRBC # BLD AUTO: 0 K/UL
NRBC BLD-RTO: 0 /100 WBC
PLATELET # BLD AUTO: 319 K/UL (ref 164–446)
PMV BLD AUTO: 9.8 FL (ref 9–12.9)
RBC # BLD AUTO: 4.27 M/UL (ref 4.2–5.4)
WBC # BLD AUTO: 4.5 K/UL (ref 4.8–10.8)

## 2023-04-06 PROCEDURE — 36415 COLL VENOUS BLD VENIPUNCTURE: CPT

## 2023-04-06 PROCEDURE — 85025 COMPLETE CBC W/AUTO DIFF WBC: CPT

## 2023-04-06 PROCEDURE — 84482 T3 REVERSE: CPT

## 2023-04-06 PROCEDURE — 84481 FREE ASSAY (FT-3): CPT

## 2023-04-07 LAB — T3FREE SERPL-MCNC: 2.49 PG/ML (ref 2–4.4)

## 2023-04-11 LAB — T3REVERSE SERPL-MCNC: 10.7 NG/DL (ref 9–27)

## 2023-05-10 ENCOUNTER — HOSPITAL ENCOUNTER (OUTPATIENT)
Dept: LAB | Facility: MEDICAL CENTER | Age: 68
End: 2023-05-10
Attending: INTERNAL MEDICINE
Payer: MEDICARE

## 2023-05-10 LAB
BASOPHILS # BLD AUTO: 1.5 % (ref 0–1.8)
BASOPHILS # BLD: 0.06 K/UL (ref 0–0.12)
EOSINOPHIL # BLD AUTO: 0.06 K/UL (ref 0–0.51)
EOSINOPHIL NFR BLD: 1.5 % (ref 0–6.9)
ERYTHROCYTE [DISTWIDTH] IN BLOOD BY AUTOMATED COUNT: 44.3 FL (ref 35.9–50)
HCT VFR BLD AUTO: 41.7 % (ref 37–47)
HGB BLD-MCNC: 13.3 G/DL (ref 12–16)
IMM GRANULOCYTES # BLD AUTO: 0.01 K/UL (ref 0–0.11)
IMM GRANULOCYTES NFR BLD AUTO: 0.2 % (ref 0–0.9)
LYMPHOCYTES # BLD AUTO: 1.56 K/UL (ref 1–4.8)
LYMPHOCYTES NFR BLD: 38.2 % (ref 22–41)
MCH RBC QN AUTO: 30.3 PG (ref 27–33)
MCHC RBC AUTO-ENTMCNC: 31.9 G/DL (ref 33.6–35)
MCV RBC AUTO: 95 FL (ref 81.4–97.8)
MONOCYTES # BLD AUTO: 0.3 K/UL (ref 0–0.85)
MONOCYTES NFR BLD AUTO: 7.4 % (ref 0–13.4)
NEUTROPHILS # BLD AUTO: 2.09 K/UL (ref 2–7.15)
NEUTROPHILS NFR BLD: 51.2 % (ref 44–72)
NRBC # BLD AUTO: 0 K/UL
NRBC BLD-RTO: 0 /100 WBC
PLATELET # BLD AUTO: 347 K/UL (ref 164–446)
PMV BLD AUTO: 9.7 FL (ref 9–12.9)
RBC # BLD AUTO: 4.39 M/UL (ref 4.2–5.4)
WBC # BLD AUTO: 4.1 K/UL (ref 4.8–10.8)

## 2023-05-10 PROCEDURE — 84480 ASSAY TRIIODOTHYRONINE (T3): CPT

## 2023-05-10 PROCEDURE — 84443 ASSAY THYROID STIM HORMONE: CPT

## 2023-05-10 PROCEDURE — 80053 COMPREHEN METABOLIC PANEL: CPT

## 2023-05-10 PROCEDURE — 84482 T3 REVERSE: CPT

## 2023-05-10 PROCEDURE — 80061 LIPID PANEL: CPT

## 2023-05-10 PROCEDURE — 85025 COMPLETE CBC W/AUTO DIFF WBC: CPT

## 2023-05-10 PROCEDURE — 36415 COLL VENOUS BLD VENIPUNCTURE: CPT

## 2023-05-11 LAB
ALBUMIN SERPL BCP-MCNC: 4.4 G/DL (ref 3.2–4.9)
ALBUMIN/GLOB SERPL: 1.8 G/DL
ALP SERPL-CCNC: 89 U/L (ref 30–99)
ALT SERPL-CCNC: 20 U/L (ref 2–50)
ANION GAP SERPL CALC-SCNC: 14 MMOL/L (ref 7–16)
AST SERPL-CCNC: 21 U/L (ref 12–45)
BILIRUB SERPL-MCNC: 0.3 MG/DL (ref 0.1–1.5)
BUN SERPL-MCNC: 12 MG/DL (ref 8–22)
CALCIUM ALBUM COR SERPL-MCNC: 9.1 MG/DL (ref 8.5–10.5)
CALCIUM SERPL-MCNC: 9.4 MG/DL (ref 8.5–10.5)
CHLORIDE SERPL-SCNC: 107 MMOL/L (ref 96–112)
CHOLEST SERPL-MCNC: 180 MG/DL (ref 100–199)
CO2 SERPL-SCNC: 25 MMOL/L (ref 20–33)
CREAT SERPL-MCNC: 0.81 MG/DL (ref 0.5–1.4)
GFR SERPLBLD CREATININE-BSD FMLA CKD-EPI: 79 ML/MIN/1.73 M 2
GLOBULIN SER CALC-MCNC: 2.5 G/DL (ref 1.9–3.5)
GLUCOSE SERPL-MCNC: 85 MG/DL (ref 65–99)
HDLC SERPL-MCNC: 52 MG/DL
LDLC SERPL CALC-MCNC: 106 MG/DL
POTASSIUM SERPL-SCNC: 4.6 MMOL/L (ref 3.6–5.5)
PROT SERPL-MCNC: 6.9 G/DL (ref 6–8.2)
SODIUM SERPL-SCNC: 146 MMOL/L (ref 135–145)
T3 SERPL-MCNC: 94.5 NG/DL (ref 60–181)
TRIGL SERPL-MCNC: 110 MG/DL (ref 0–149)
TSH SERPL DL<=0.005 MIU/L-ACNC: 1.96 UIU/ML (ref 0.38–5.33)

## 2023-05-13 LAB — T3REVERSE SERPL-MCNC: 9.6 NG/DL (ref 9–27)

## 2023-05-15 ENCOUNTER — TELEPHONE (OUTPATIENT)
Dept: HEALTH INFORMATION MANAGEMENT | Facility: OTHER | Age: 68
End: 2023-05-15

## 2023-07-14 RX ORDER — OLMESARTAN MEDOXOMIL 5 MG/1
TABLET ORAL
Qty: 100 TABLET | Refills: 1 | Status: SHIPPED | OUTPATIENT
Start: 2023-07-14

## 2023-07-14 RX ORDER — OLMESARTAN MEDOXOMIL 5 MG/1
TABLET ORAL
Qty: 90 TABLET | Refills: 1 | Status: SHIPPED | OUTPATIENT
Start: 2023-07-14 | End: 2023-07-14 | Stop reason: SDUPTHER

## 2023-07-25 ENCOUNTER — HOSPITAL ENCOUNTER (OUTPATIENT)
Facility: MEDICAL CENTER | Age: 68
End: 2023-07-25
Payer: MEDICARE

## 2023-07-25 ENCOUNTER — OFFICE VISIT (OUTPATIENT)
Dept: MEDICAL GROUP | Facility: PHYSICIAN GROUP | Age: 68
End: 2023-07-25
Payer: MEDICARE

## 2023-07-25 VITALS
HEIGHT: 65 IN | HEART RATE: 68 BPM | TEMPERATURE: 99.2 F | BODY MASS INDEX: 25.49 KG/M2 | OXYGEN SATURATION: 95 % | SYSTOLIC BLOOD PRESSURE: 130 MMHG | WEIGHT: 153 LBS | DIASTOLIC BLOOD PRESSURE: 70 MMHG

## 2023-07-25 DIAGNOSIS — R30.9 PAINFUL URINATION: ICD-10-CM

## 2023-07-25 DIAGNOSIS — R35.0 URINARY FREQUENCY: ICD-10-CM

## 2023-07-25 DIAGNOSIS — N89.8 VAGINAL IRRITATION: ICD-10-CM

## 2023-07-25 PROCEDURE — 87480 CANDIDA DNA DIR PROBE: CPT

## 2023-07-25 PROCEDURE — 3078F DIAST BP <80 MM HG: CPT

## 2023-07-25 PROCEDURE — 81002 URINALYSIS NONAUTO W/O SCOPE: CPT

## 2023-07-25 PROCEDURE — 3075F SYST BP GE 130 - 139MM HG: CPT

## 2023-07-25 PROCEDURE — 87510 GARDNER VAG DNA DIR PROBE: CPT

## 2023-07-25 PROCEDURE — 99213 OFFICE O/P EST LOW 20 MIN: CPT

## 2023-07-25 PROCEDURE — 87660 TRICHOMONAS VAGIN DIR PROBE: CPT

## 2023-07-25 ASSESSMENT — FIBROSIS 4 INDEX: FIB4 SCORE: 0.92

## 2023-07-25 ASSESSMENT — ENCOUNTER SYMPTOMS
DIZZINESS: 0
DIARRHEA: 0
CHILLS: 0
VOMITING: 0
WEIGHT LOSS: 0
HEADACHES: 0
MYALGIAS: 0
BLURRED VISION: 0
FEVER: 0
FLANK PAIN: 0
WEAKNESS: 0
COUGH: 0
ABDOMINAL PAIN: 0
NAUSEA: 0
CONSTIPATION: 0
SHORTNESS OF BREATH: 0

## 2023-07-26 DIAGNOSIS — R35.0 URINARY FREQUENCY: ICD-10-CM

## 2023-07-26 DIAGNOSIS — N89.8 VAGINAL IRRITATION: ICD-10-CM

## 2023-07-26 LAB
CANDIDA DNA VAG QL PROBE+SIG AMP: NEGATIVE
G VAGINALIS DNA VAG QL PROBE+SIG AMP: NEGATIVE
T VAGINALIS DNA VAG QL PROBE+SIG AMP: NEGATIVE

## 2023-07-26 NOTE — ASSESSMENT & PLAN NOTE
Acute condition of undetermined significance therefore will obtain vaginal pathogens to rule out yeast and bacterial vaginitis  -Recommend over-the-counter ibuprofen 400 to 600 mg if needed for inner thigh pain  -Blood was discovered in urine however, patient reports that she has chronic hematuria and has been worked up by urology for an test have come back inconclusive this for the past  -Medications reviewed with patient for yeast and bacterial vaginitis should these results come back positive

## 2023-07-26 NOTE — PROGRESS NOTES
"Subjective:     CC: Possible UTI    HPI:   Deya presents today with    Problem   Urinary Frequency    -Reports urinary frequency, cloudiness and foul-smelling urine  -Took OTC Cystex TID for 2 days and reports improvement in cloudiness and smell after taking  -Denies any fevers or chills. No flank or abdominal pain.  She does however have some left inner thigh tenderness with mild lymphadenopathy     Painful Urination (Resolved)       Health Maintenance: Unable to address    ROS:  Review of Systems   Constitutional:  Negative for chills, fever, malaise/fatigue and weight loss.   Eyes:  Negative for blurred vision.   Respiratory:  Negative for cough and shortness of breath.    Cardiovascular:  Negative for chest pain.   Gastrointestinal:  Negative for abdominal pain, constipation, diarrhea, nausea and vomiting.   Genitourinary:  Positive for frequency. Negative for dysuria, flank pain and hematuria.   Musculoskeletal:  Negative for myalgias.   Neurological:  Negative for dizziness, weakness and headaches.       Objective:     Exam:  /70 (BP Location: Left arm, Patient Position: Sitting, BP Cuff Size: Adult)   Pulse 68   Temp 37.3 °C (99.2 °F) (Temporal)   Ht 1.651 m (5' 5\")   Wt 69.4 kg (153 lb)   SpO2 95%   BMI 25.46 kg/m²  Body mass index is 25.46 kg/m².    Physical Exam  Constitutional:       General: She is not in acute distress.     Appearance: Normal appearance. She is not ill-appearing or toxic-appearing.   HENT:      Head: Normocephalic.   Eyes:      Conjunctiva/sclera: Conjunctivae normal.   Pulmonary:      Effort: Pulmonary effort is normal.   Skin:     General: Skin is warm and dry.   Neurological:      General: No focal deficit present.      Mental Status: She is alert and oriented to person, place, and time.   Psychiatric:         Mood and Affect: Mood normal.         Behavior: Behavior normal.         Labs:      Latest Reference Range & Units 07/25/23 17:02   POC Color Negative  yl   POC " Appearance Negative  sl cloudy   POC Specific Gravity <1.005 - >1.030  1.010   POC Urine PH 5.0 - 8.0  6.5   POC Glucose Negative mg/dL neg   POC Ketones Negative mg/dL neg   POC Protein Negative mg/dL neg   POC Nitrites Negative  neg   POC Leukocyte Esterase Negative  neg   POC Blood Negative  mod   POC Bilirubin Negative mg/dL neg   POC Urobiligen Negative (0.2) mg/dL 0.2       Assessment & Plan: Medical Decision Making     68 y.o. female with the following -     Problem List Items Addressed This Visit       Urinary frequency     Acute condition of undetermined significance therefore will obtain vaginal pathogens to rule out yeast and bacterial vaginitis  -Recommend over-the-counter ibuprofen 400 to 600 mg if needed for inner thigh pain  -Blood was discovered in urine however, patient reports that she has chronic hematuria and has been worked up by urology for an test have come back inconclusive this for the past  -Medications reviewed with patient for yeast and bacterial vaginitis should these results come back positive         Relevant Orders    VAGINAL PATHOGENS DNA PANEL    RESOLVED: Painful urination    Relevant Orders    POCT Urinalysis (Completed)     Other Visit Diagnoses       Vaginal irritation        Relevant Orders    VAGINAL PATHOGENS DNA PANEL            Differential diagnosis, natural history, supportive care, and indications for immediate follow-up discussed.  Shared decision making approach utilized, and patient is amendable with plan of care.  Patient understands to return to clinic or go to the emergency department if symptoms worsen. All questions and concerns addressed to the best of my knowledge.    Return if symptoms worsen or fail to improve.    Please note that this dictation was created using voice recognition software. I have made every reasonable attempt to correct obvious errors, but I expect that there are errors of grammar and possibly content that I did not discover before  finalizing the note.

## 2023-07-31 ENCOUNTER — HOSPITAL ENCOUNTER (OUTPATIENT)
Dept: LAB | Facility: MEDICAL CENTER | Age: 68
End: 2023-07-31
Payer: MEDICARE

## 2023-07-31 DIAGNOSIS — I10 PRIMARY HYPERTENSION: ICD-10-CM

## 2023-07-31 DIAGNOSIS — E78.5 DYSLIPIDEMIA: ICD-10-CM

## 2023-07-31 LAB
ALBUMIN SERPL BCP-MCNC: 4.4 G/DL (ref 3.2–4.9)
ALBUMIN/GLOB SERPL: 1.7 G/DL
ALP SERPL-CCNC: 85 U/L (ref 30–99)
ALT SERPL-CCNC: 15 U/L (ref 2–50)
ANION GAP SERPL CALC-SCNC: 10 MMOL/L (ref 7–16)
AST SERPL-CCNC: 19 U/L (ref 12–45)
BILIRUB SERPL-MCNC: 0.3 MG/DL (ref 0.1–1.5)
BUN SERPL-MCNC: 17 MG/DL (ref 8–22)
CALCIUM ALBUM COR SERPL-MCNC: 9.1 MG/DL (ref 8.5–10.5)
CALCIUM SERPL-MCNC: 9.4 MG/DL (ref 8.5–10.5)
CHLORIDE SERPL-SCNC: 105 MMOL/L (ref 96–112)
CHOLEST SERPL-MCNC: 182 MG/DL (ref 100–199)
CO2 SERPL-SCNC: 26 MMOL/L (ref 20–33)
CREAT SERPL-MCNC: 0.83 MG/DL (ref 0.5–1.4)
FASTING STATUS PATIENT QL REPORTED: NORMAL
GFR SERPLBLD CREATININE-BSD FMLA CKD-EPI: 77 ML/MIN/1.73 M 2
GLOBULIN SER CALC-MCNC: 2.6 G/DL (ref 1.9–3.5)
GLUCOSE SERPL-MCNC: 87 MG/DL (ref 65–99)
HDLC SERPL-MCNC: 55 MG/DL
LDLC SERPL CALC-MCNC: 101 MG/DL
POTASSIUM SERPL-SCNC: 4.9 MMOL/L (ref 3.6–5.5)
PROT SERPL-MCNC: 7 G/DL (ref 6–8.2)
SODIUM SERPL-SCNC: 141 MMOL/L (ref 135–145)
TRIGL SERPL-MCNC: 128 MG/DL (ref 0–149)

## 2023-07-31 PROCEDURE — 80061 LIPID PANEL: CPT

## 2023-07-31 PROCEDURE — 80053 COMPREHEN METABOLIC PANEL: CPT

## 2023-07-31 PROCEDURE — 36415 COLL VENOUS BLD VENIPUNCTURE: CPT

## 2023-08-03 ENCOUNTER — OFFICE VISIT (OUTPATIENT)
Dept: MEDICAL GROUP | Facility: PHYSICIAN GROUP | Age: 68
End: 2023-08-03
Payer: MEDICARE

## 2023-08-03 VITALS
WEIGHT: 153 LBS | DIASTOLIC BLOOD PRESSURE: 80 MMHG | BODY MASS INDEX: 25.49 KG/M2 | TEMPERATURE: 99.1 F | HEART RATE: 71 BPM | OXYGEN SATURATION: 94 % | SYSTOLIC BLOOD PRESSURE: 146 MMHG | HEIGHT: 65 IN

## 2023-08-03 DIAGNOSIS — R10.9 FLANK PAIN: ICD-10-CM

## 2023-08-03 DIAGNOSIS — I10 PRIMARY HYPERTENSION: ICD-10-CM

## 2023-08-03 DIAGNOSIS — R35.0 URINARY FREQUENCY: ICD-10-CM

## 2023-08-03 DIAGNOSIS — R31.9 HEMATURIA, UNSPECIFIED TYPE: ICD-10-CM

## 2023-08-03 DIAGNOSIS — H91.93 HEARING DIFFICULTY OF BOTH EARS: ICD-10-CM

## 2023-08-03 PROBLEM — H91.90 HEARING LOSS: Status: ACTIVE | Noted: 2023-08-03

## 2023-08-03 LAB
APPEARANCE UR: NORMAL
BILIRUB UR STRIP-MCNC: NORMAL MG/DL
COLOR UR AUTO: NORMAL
GLUCOSE UR STRIP.AUTO-MCNC: NORMAL MG/DL
KETONES UR STRIP.AUTO-MCNC: NORMAL MG/DL
LEUKOCYTE ESTERASE UR QL STRIP.AUTO: NORMAL
NITRITE UR QL STRIP.AUTO: NORMAL
PH UR STRIP.AUTO: 5 [PH] (ref 5–8)
PROT UR QL STRIP: NORMAL MG/DL
RBC UR QL AUTO: NORMAL
SP GR UR STRIP.AUTO: 1
UROBILINOGEN UR STRIP-MCNC: 0.2 MG/DL

## 2023-08-03 PROCEDURE — 3079F DIAST BP 80-89 MM HG: CPT

## 2023-08-03 PROCEDURE — 3077F SYST BP >= 140 MM HG: CPT

## 2023-08-03 PROCEDURE — 99214 OFFICE O/P EST MOD 30 MIN: CPT

## 2023-08-03 PROCEDURE — 81002 URINALYSIS NONAUTO W/O SCOPE: CPT

## 2023-08-03 ASSESSMENT — FIBROSIS 4 INDEX: FIB4 SCORE: 0.96

## 2023-08-03 NOTE — ASSESSMENT & PLAN NOTE
Acute condition-hematuria noted therefore will refer to urology for reevaluation  -Patient declines imaging-CT abdomen pelvis at this time

## 2023-08-03 NOTE — PROGRESS NOTES
Subjective:     CC: Lab results, hypertension and difficulty hearing    HPI:   Deya presents today with    Problem   Flank Pain    Reports a few days of flank pain. Had mild UTI a few weeks ago and took. Cystex OTC and UA was negative as well as culture. Reports occ mild burning but is experiencing frequency since previous UTI. Denies gross hematuria but does have chronic hematuria over many decades and has been seen in the past by urology.    Latest Reference Range & Units 08/03/23 11:18   POC Color Negative  yl   POC Appearance Negative  clr   POC Specific Gravity <1.005 - >1.030  1.005   POC Urine PH 5.0 - 8.0  5.0   POC Glucose Negative mg/dL neg   POC Ketones Negative mg/dL neg   POC Protein Negative mg/dL neg   POC Nitrites Negative  neg   POC Leukocyte Esterase Negative  neg   POC Blood Negative  mod   POC Bilirubin Negative mg/dL neg   POC Urobiligen Negative (0.2) mg/dL 0.2        Hematuria    Patient notes multi decade history of hematuria.  She has been seen by urology in the past but was told there was no source for the bleeding that they could find.  She has not been seen by urology in many years.  She continues to have persistent urinary frequency/urgency along with occasional dysuria.  The last few times she has been to clinic UA does show moderate amount of blood in urine without evidence of infection.  Urine culture has been negative for bacterial growth.     Primary Hypertension    Was experiencing high readings for several months  -In November I placed her on Olmesartan 20 mg.  She has been now only taking 2.5 mg daily having much improved BP readings and resolution of HA, blurred vision. However now BP is slightly elevated: 146/80, home readings 120-130/70-90 on average therefore will increase back to 5 mg daily.       Hearing Difficulty of Both Ears    Reports hearing feels that it is getting worse over the past few months. Feels left is worse. Has had cerumen impaction in the past but today  "ears are clear.          Health Maintenance: Declines    ROS: See HPI  ROS    Objective:     Exam:  BP (!) 146/80 (BP Location: Left arm, Patient Position: Sitting, BP Cuff Size: Adult)   Pulse 71   Temp 37.3 °C (99.1 °F) (Temporal)   Ht 1.651 m (5' 5\")   Wt 69.4 kg (153 lb)   SpO2 94%   BMI 25.46 kg/m²  Body mass index is 25.46 kg/m².    Physical Exam  Constitutional:       General: She is not in acute distress.     Appearance: Normal appearance. She is not ill-appearing or toxic-appearing.   HENT:      Head: Normocephalic.   Eyes:      Conjunctiva/sclera: Conjunctivae normal.   Pulmonary:      Effort: Pulmonary effort is normal.   Skin:     General: Skin is warm and dry.   Neurological:      General: No focal deficit present.      Mental Status: She is alert and oriented to person, place, and time.   Psychiatric:         Mood and Affect: Mood normal.         Behavior: Behavior normal.         Labs:    Latest Reference Range & Units 07/31/23 08:55   Sodium 135 - 145 mmol/L 141   Potassium 3.6 - 5.5 mmol/L 4.9   Chloride 96 - 112 mmol/L 105   Co2 20 - 33 mmol/L 26   Anion Gap 7.0 - 16.0  10.0   Glucose 65 - 99 mg/dL 87   Bun 8 - 22 mg/dL 17   Creatinine 0.50 - 1.40 mg/dL 0.83   GFR (CKD-EPI) >60 mL/min/1.73 m 2 77   Calcium 8.5 - 10.5 mg/dL 9.4   Correct Calcium 8.5 - 10.5 mg/dL 9.1   AST(SGOT) 12 - 45 U/L 19   ALT(SGPT) 2 - 50 U/L 15   Alkaline Phosphatase 30 - 99 U/L 85   Total Bilirubin 0.1 - 1.5 mg/dL 0.3   Albumin 3.2 - 4.9 g/dL 4.4   Total Protein 6.0 - 8.2 g/dL 7.0   Globulin 1.9 - 3.5 g/dL 2.6   A-G Ratio g/dL 1.7   Fasting Status  Fasting   Cholesterol,Tot 100 - 199 mg/dL 182   Triglycerides 0 - 149 mg/dL 128   HDL >=40 mg/dL 55   LDL <100 mg/dL 101 (H)     Assessment & Plan: Medical Decision Making     68 y.o. female with the following -     Problem List Items Addressed This Visit       Hearing difficulty of both ears     Chronic progressive-therefore will refer to audiology for hearing evaluation   "       Relevant Orders    Referral to Audiology    Primary hypertension     Chronic condition-uncontrolled  -Losartan increased to 5 mg daily  -Patient to continue to keep home blood pressure log         Urinary frequency    Relevant Orders    Referral to Urology    Flank pain     Acute condition-hematuria noted therefore will refer to urology for reevaluation  -Patient declines imaging-CT abdomen pelvis at this time         Relevant Orders    POCT Urinalysis (Completed)    Hematuria     Undiagnosed condition of uncertain prognosis therefore will send to urology for further evaluation  -Antibiotics not recommended at this time secondary to no evidence of infection         Relevant Orders    Referral to Urology       Differential diagnosis, natural history, supportive care, and indications for immediate follow-up discussed.  Shared decision making approach utilized, and patient is amendable with plan of care.  Patient understands to return to clinic or go to the emergency department if symptoms worsen. All questions and concerns addressed to the best of my knowledge.    Return in about 6 months (around 2/3/2024), or if symptoms worsen or fail to improve.    Please note that this dictation was created using voice recognition software. I have made every reasonable attempt to correct obvious errors, but I expect that there are errors of grammar and possibly content that I did not discover before finalizing the note.

## 2023-08-03 NOTE — ASSESSMENT & PLAN NOTE
Chronic condition-uncontrolled  -Losartan increased to 5 mg daily  -Patient to continue to keep home blood pressure log

## 2023-08-03 NOTE — ASSESSMENT & PLAN NOTE
Undiagnosed condition of uncertain prognosis therefore will send to urology for further evaluation  -Antibiotics not recommended at this time secondary to no evidence of infection

## 2023-08-11 ENCOUNTER — HOSPITAL ENCOUNTER (OUTPATIENT)
Facility: MEDICAL CENTER | Age: 68
End: 2023-08-11
Attending: STUDENT IN AN ORGANIZED HEALTH CARE EDUCATION/TRAINING PROGRAM
Payer: MEDICARE

## 2023-08-11 LAB
APPEARANCE UR: CLEAR
BACTERIA #/AREA URNS HPF: NEGATIVE /HPF
BILIRUB UR QL STRIP.AUTO: NEGATIVE
COLOR UR: YELLOW
EPI CELLS #/AREA URNS HPF: NEGATIVE /HPF
GLUCOSE UR STRIP.AUTO-MCNC: NEGATIVE MG/DL
HYALINE CASTS #/AREA URNS LPF: ABNORMAL /LPF
KETONES UR STRIP.AUTO-MCNC: NEGATIVE MG/DL
LEUKOCYTE ESTERASE UR QL STRIP.AUTO: NEGATIVE
MICRO URNS: ABNORMAL
NITRITE UR QL STRIP.AUTO: NEGATIVE
PH UR STRIP.AUTO: 6.5 [PH] (ref 5–8)
PROT UR QL STRIP: NEGATIVE MG/DL
RBC # URNS HPF: ABNORMAL /HPF
RBC UR QL AUTO: ABNORMAL
SP GR UR STRIP.AUTO: 1.01
UROBILINOGEN UR STRIP.AUTO-MCNC: 0.2 MG/DL
WBC #/AREA URNS HPF: ABNORMAL /HPF

## 2023-08-11 PROCEDURE — 81001 URINALYSIS AUTO W/SCOPE: CPT

## 2023-08-22 DIAGNOSIS — Z12.31 ENCOUNTER FOR SCREENING MAMMOGRAM FOR MALIGNANT NEOPLASM OF BREAST: ICD-10-CM

## 2023-08-22 DIAGNOSIS — H91.93 HEARING DIFFICULTY OF BOTH EARS: ICD-10-CM

## 2023-08-30 ENCOUNTER — HOSPITAL ENCOUNTER (OUTPATIENT)
Dept: LAB | Facility: MEDICAL CENTER | Age: 68
End: 2023-08-30
Attending: INTERNAL MEDICINE
Payer: MEDICARE

## 2023-08-30 LAB
ALBUMIN SERPL BCP-MCNC: 4.4 G/DL (ref 3.2–4.9)
ALBUMIN/GLOB SERPL: 1.8 G/DL
ALP SERPL-CCNC: 90 U/L (ref 30–99)
ALT SERPL-CCNC: 18 U/L (ref 2–50)
ANION GAP SERPL CALC-SCNC: 10 MMOL/L (ref 7–16)
APPEARANCE UR: CLEAR
AST SERPL-CCNC: 19 U/L (ref 12–45)
BACTERIA #/AREA URNS HPF: ABNORMAL /HPF
BASOPHILS # BLD AUTO: 0.6 % (ref 0–1.8)
BASOPHILS # BLD: 0.03 K/UL (ref 0–0.12)
BILIRUB SERPL-MCNC: 0.4 MG/DL (ref 0.1–1.5)
BILIRUB UR QL STRIP.AUTO: NEGATIVE
BUN SERPL-MCNC: 13 MG/DL (ref 8–22)
CALCIUM ALBUM COR SERPL-MCNC: 9.4 MG/DL (ref 8.5–10.5)
CALCIUM SERPL-MCNC: 9.7 MG/DL (ref 8.5–10.5)
CHLORIDE SERPL-SCNC: 104 MMOL/L (ref 96–112)
CHOLEST SERPL-MCNC: 173 MG/DL (ref 100–199)
CO2 SERPL-SCNC: 26 MMOL/L (ref 20–33)
COLOR UR: YELLOW
CREAT SERPL-MCNC: 0.89 MG/DL (ref 0.5–1.4)
EOSINOPHIL # BLD AUTO: 0.05 K/UL (ref 0–0.51)
EOSINOPHIL NFR BLD: 1 % (ref 0–6.9)
EPI CELLS #/AREA URNS HPF: NEGATIVE /HPF
ERYTHROCYTE [DISTWIDTH] IN BLOOD BY AUTOMATED COUNT: 45.4 FL (ref 35.9–50)
FASTING STATUS PATIENT QL REPORTED: NORMAL
FERRITIN SERPL-MCNC: 122 NG/ML (ref 10–291)
GFR SERPLBLD CREATININE-BSD FMLA CKD-EPI: 70 ML/MIN/1.73 M 2
GLOBULIN SER CALC-MCNC: 2.4 G/DL (ref 1.9–3.5)
GLUCOSE SERPL-MCNC: 86 MG/DL (ref 65–99)
GLUCOSE UR STRIP.AUTO-MCNC: NEGATIVE MG/DL
HCT VFR BLD AUTO: 38.8 % (ref 37–47)
HDLC SERPL-MCNC: 51 MG/DL
HGB BLD-MCNC: 12.4 G/DL (ref 12–16)
HYALINE CASTS #/AREA URNS LPF: ABNORMAL /LPF
IMM GRANULOCYTES # BLD AUTO: 0.01 K/UL (ref 0–0.11)
IMM GRANULOCYTES NFR BLD AUTO: 0.2 % (ref 0–0.9)
IRON SERPL-MCNC: 97 UG/DL (ref 40–170)
KETONES UR STRIP.AUTO-MCNC: NEGATIVE MG/DL
LDLC SERPL CALC-MCNC: 100 MG/DL
LEUKOCYTE ESTERASE UR QL STRIP.AUTO: ABNORMAL
LYMPHOCYTES # BLD AUTO: 1.99 K/UL (ref 1–4.8)
LYMPHOCYTES NFR BLD: 41.4 % (ref 22–41)
MAGNESIUM SERPL-MCNC: 2.3 MG/DL (ref 1.5–2.5)
MCH RBC QN AUTO: 30.7 PG (ref 27–33)
MCHC RBC AUTO-ENTMCNC: 32 G/DL (ref 32.2–35.5)
MCV RBC AUTO: 96 FL (ref 81.4–97.8)
MICRO URNS: ABNORMAL
MONOCYTES # BLD AUTO: 0.33 K/UL (ref 0–0.85)
MONOCYTES NFR BLD AUTO: 6.9 % (ref 0–13.4)
NEUTROPHILS # BLD AUTO: 2.4 K/UL (ref 1.82–7.42)
NEUTROPHILS NFR BLD: 49.9 % (ref 44–72)
NITRITE UR QL STRIP.AUTO: POSITIVE
NRBC # BLD AUTO: 0 K/UL
NRBC BLD-RTO: 0 /100 WBC (ref 0–0.2)
PH UR STRIP.AUTO: 6.5 [PH] (ref 5–8)
PHOSPHATE SERPL-MCNC: 3.8 MG/DL (ref 2.5–4.5)
PLATELET # BLD AUTO: 308 K/UL (ref 164–446)
PMV BLD AUTO: 10 FL (ref 9–12.9)
POTASSIUM SERPL-SCNC: 4.6 MMOL/L (ref 3.6–5.5)
PROT SERPL-MCNC: 6.8 G/DL (ref 6–8.2)
PROT UR QL STRIP: NEGATIVE MG/DL
RBC # BLD AUTO: 4.04 M/UL (ref 4.2–5.4)
RBC # URNS HPF: ABNORMAL /HPF
RBC UR QL AUTO: ABNORMAL
SODIUM SERPL-SCNC: 140 MMOL/L (ref 135–145)
SP GR UR STRIP.AUTO: 1.01
TRIGL SERPL-MCNC: 109 MG/DL (ref 0–149)
TSH SERPL DL<=0.005 MIU/L-ACNC: 3.31 UIU/ML (ref 0.38–5.33)
URATE SERPL-MCNC: 3 MG/DL (ref 1.9–8.2)
UROBILINOGEN UR STRIP.AUTO-MCNC: 0.2 MG/DL
WBC # BLD AUTO: 4.8 K/UL (ref 4.8–10.8)
WBC #/AREA URNS HPF: ABNORMAL /HPF

## 2023-08-30 PROCEDURE — 84443 ASSAY THYROID STIM HORMONE: CPT

## 2023-08-30 PROCEDURE — 80061 LIPID PANEL: CPT

## 2023-08-30 PROCEDURE — 83540 ASSAY OF IRON: CPT

## 2023-08-30 PROCEDURE — 83735 ASSAY OF MAGNESIUM: CPT

## 2023-08-30 PROCEDURE — 84100 ASSAY OF PHOSPHORUS: CPT

## 2023-08-30 PROCEDURE — 84550 ASSAY OF BLOOD/URIC ACID: CPT

## 2023-08-30 PROCEDURE — 85025 COMPLETE CBC W/AUTO DIFF WBC: CPT

## 2023-08-30 PROCEDURE — 80053 COMPREHEN METABOLIC PANEL: CPT

## 2023-08-30 PROCEDURE — 36415 COLL VENOUS BLD VENIPUNCTURE: CPT

## 2023-08-30 PROCEDURE — 81001 URINALYSIS AUTO W/SCOPE: CPT

## 2023-08-30 PROCEDURE — 82728 ASSAY OF FERRITIN: CPT

## 2023-09-05 ENCOUNTER — HOSPITAL ENCOUNTER (OUTPATIENT)
Dept: RADIOLOGY | Facility: MEDICAL CENTER | Age: 68
End: 2023-09-05
Attending: STUDENT IN AN ORGANIZED HEALTH CARE EDUCATION/TRAINING PROGRAM
Payer: MEDICARE

## 2023-09-05 DIAGNOSIS — N39.0 URINARY TRACT INFECTION WITHOUT HEMATURIA, SITE UNSPECIFIED: ICD-10-CM

## 2023-09-05 PROCEDURE — 700117 HCHG RX CONTRAST REV CODE 255: Performed by: STUDENT IN AN ORGANIZED HEALTH CARE EDUCATION/TRAINING PROGRAM

## 2023-09-05 PROCEDURE — 74178 CT ABD&PLV WO CNTR FLWD CNTR: CPT

## 2023-09-05 RX ADMIN — IOHEXOL 100 ML: 350 INJECTION, SOLUTION INTRAVENOUS at 11:31

## 2023-09-08 ENCOUNTER — HOSPITAL ENCOUNTER (EMERGENCY)
Facility: MEDICAL CENTER | Age: 68
End: 2023-09-08
Attending: EMERGENCY MEDICINE
Payer: MEDICARE

## 2023-09-08 VITALS
RESPIRATION RATE: 20 BRPM | DIASTOLIC BLOOD PRESSURE: 69 MMHG | HEART RATE: 85 BPM | WEIGHT: 153 LBS | BODY MASS INDEX: 25.49 KG/M2 | SYSTOLIC BLOOD PRESSURE: 155 MMHG | OXYGEN SATURATION: 97 % | TEMPERATURE: 97.7 F | HEIGHT: 65 IN

## 2023-09-08 DIAGNOSIS — R82.90 ABNORMAL URINALYSIS: ICD-10-CM

## 2023-09-08 DIAGNOSIS — M54.50 BILATERAL LOW BACK PAIN WITHOUT SCIATICA, UNSPECIFIED CHRONICITY: ICD-10-CM

## 2023-09-08 LAB
ALBUMIN SERPL BCP-MCNC: 4.5 G/DL (ref 3.2–4.9)
ALBUMIN/GLOB SERPL: 1.8 G/DL
ALP SERPL-CCNC: 95 U/L (ref 30–99)
ALT SERPL-CCNC: 21 U/L (ref 2–50)
ANION GAP SERPL CALC-SCNC: 14 MMOL/L (ref 7–16)
APPEARANCE UR: CLEAR
AST SERPL-CCNC: 19 U/L (ref 12–45)
BACTERIA #/AREA URNS HPF: NEGATIVE /HPF
BASOPHILS # BLD AUTO: 0.4 % (ref 0–1.8)
BASOPHILS # BLD: 0.04 K/UL (ref 0–0.12)
BILIRUB SERPL-MCNC: 0.2 MG/DL (ref 0.1–1.5)
BILIRUB UR QL STRIP.AUTO: NEGATIVE
BUN SERPL-MCNC: 14 MG/DL (ref 8–22)
CALCIUM ALBUM COR SERPL-MCNC: 8.9 MG/DL (ref 8.5–10.5)
CALCIUM SERPL-MCNC: 9.3 MG/DL (ref 8.5–10.5)
CHLORIDE SERPL-SCNC: 105 MMOL/L (ref 96–112)
CO2 SERPL-SCNC: 25 MMOL/L (ref 20–33)
COLOR UR: YELLOW
CREAT SERPL-MCNC: 0.82 MG/DL (ref 0.5–1.4)
EOSINOPHIL # BLD AUTO: 0.03 K/UL (ref 0–0.51)
EOSINOPHIL NFR BLD: 0.3 % (ref 0–6.9)
EPI CELLS #/AREA URNS HPF: NEGATIVE /HPF
ERYTHROCYTE [DISTWIDTH] IN BLOOD BY AUTOMATED COUNT: 41.6 FL (ref 35.9–50)
GFR SERPLBLD CREATININE-BSD FMLA CKD-EPI: 78 ML/MIN/1.73 M 2
GLOBULIN SER CALC-MCNC: 2.5 G/DL (ref 1.9–3.5)
GLUCOSE SERPL-MCNC: 109 MG/DL (ref 65–99)
GLUCOSE UR STRIP.AUTO-MCNC: NEGATIVE MG/DL
HCT VFR BLD AUTO: 37.1 % (ref 37–47)
HGB BLD-MCNC: 12.5 G/DL (ref 12–16)
HYALINE CASTS #/AREA URNS LPF: ABNORMAL /LPF
IMM GRANULOCYTES # BLD AUTO: 0.03 K/UL (ref 0–0.11)
IMM GRANULOCYTES NFR BLD AUTO: 0.3 % (ref 0–0.9)
KETONES UR STRIP.AUTO-MCNC: NEGATIVE MG/DL
LEUKOCYTE ESTERASE UR QL STRIP.AUTO: NEGATIVE
LYMPHOCYTES # BLD AUTO: 2.5 K/UL (ref 1–4.8)
LYMPHOCYTES NFR BLD: 25.2 % (ref 22–41)
MCH RBC QN AUTO: 30.7 PG (ref 27–33)
MCHC RBC AUTO-ENTMCNC: 33.7 G/DL (ref 32.2–35.5)
MCV RBC AUTO: 91.2 FL (ref 81.4–97.8)
MICRO URNS: ABNORMAL
MONOCYTES # BLD AUTO: 0.64 K/UL (ref 0–0.85)
MONOCYTES NFR BLD AUTO: 6.4 % (ref 0–13.4)
NEUTROPHILS # BLD AUTO: 6.7 K/UL (ref 1.82–7.42)
NEUTROPHILS NFR BLD: 67.4 % (ref 44–72)
NITRITE UR QL STRIP.AUTO: NEGATIVE
NRBC # BLD AUTO: 0 K/UL
NRBC BLD-RTO: 0 /100 WBC (ref 0–0.2)
PH UR STRIP.AUTO: 8 [PH] (ref 5–8)
PLATELET # BLD AUTO: 305 K/UL (ref 164–446)
PMV BLD AUTO: 9.4 FL (ref 9–12.9)
POTASSIUM SERPL-SCNC: 3.6 MMOL/L (ref 3.6–5.5)
PROT SERPL-MCNC: 7 G/DL (ref 6–8.2)
PROT UR QL STRIP: NEGATIVE MG/DL
RBC # BLD AUTO: 4.07 M/UL (ref 4.2–5.4)
RBC # URNS HPF: ABNORMAL /HPF
RBC UR QL AUTO: ABNORMAL
SODIUM SERPL-SCNC: 144 MMOL/L (ref 135–145)
SP GR UR STRIP.AUTO: 1.01
UROBILINOGEN UR STRIP.AUTO-MCNC: 0.2 MG/DL
WBC # BLD AUTO: 9.9 K/UL (ref 4.8–10.8)
WBC #/AREA URNS HPF: ABNORMAL /HPF

## 2023-09-08 PROCEDURE — 99284 EMERGENCY DEPT VISIT MOD MDM: CPT

## 2023-09-08 PROCEDURE — 81001 URINALYSIS AUTO W/SCOPE: CPT

## 2023-09-08 PROCEDURE — 87077 CULTURE AEROBIC IDENTIFY: CPT

## 2023-09-08 PROCEDURE — 700111 HCHG RX REV CODE 636 W/ 250 OVERRIDE (IP): Mod: JZ | Performed by: EMERGENCY MEDICINE

## 2023-09-08 PROCEDURE — 36415 COLL VENOUS BLD VENIPUNCTURE: CPT

## 2023-09-08 PROCEDURE — 85025 COMPLETE CBC W/AUTO DIFF WBC: CPT

## 2023-09-08 PROCEDURE — 87186 SC STD MICRODIL/AGAR DIL: CPT

## 2023-09-08 PROCEDURE — 87086 URINE CULTURE/COLONY COUNT: CPT

## 2023-09-08 PROCEDURE — 96374 THER/PROPH/DIAG INJ IV PUSH: CPT

## 2023-09-08 PROCEDURE — 80053 COMPREHEN METABOLIC PANEL: CPT

## 2023-09-08 RX ORDER — CEFTRIAXONE 2 G/1
2000 INJECTION, POWDER, FOR SOLUTION INTRAMUSCULAR; INTRAVENOUS ONCE
Status: COMPLETED | OUTPATIENT
Start: 2023-09-08 | End: 2023-09-08

## 2023-09-08 RX ADMIN — CEFTRIAXONE SODIUM 2000 MG: 2 INJECTION, POWDER, FOR SOLUTION INTRAMUSCULAR; INTRAVENOUS at 21:07

## 2023-09-08 ASSESSMENT — FIBROSIS 4 INDEX: FIB4 SCORE: 0.99

## 2023-09-09 NOTE — ED NOTES
Discharge instructions given to pt. Pt verbalized understanding. All questions have been addressed. PIV pulled out. Discharged pt in stable condition.

## 2023-09-09 NOTE — DISCHARGE INSTRUCTIONS
Call your urologist or your primary care doctor first thing Monday morning and arrange office recheck as soon as possible during the week.  If you feel you are developing new or worsening symptoms return here immediately for recheck.

## 2023-09-09 NOTE — ED TRIAGE NOTES
"Chief Complaint   Patient presents with    Flank Pain     Bilateral flank pain.      BIBA, pain started at around 1840H tonight.     Fentanyl 75mcg and Zofran 4mg IV given en route.       BP (!) 184/84   Pulse 81   Temp 36.4 °C (97.5 °F) (Temporal)   Resp 20   Ht 1.651 m (5' 5\")   Wt 69.4 kg (153 lb)   SpO2 99%   BMI 25.46 kg/m²     "

## 2023-09-09 NOTE — ED PROVIDER NOTES
ED Provider Note    CHIEF COMPLAINT  Chief Complaint   Patient presents with    Flank Pain     Bilateral flank pain.        EXTERNAL RECORDS REVIEWED  Other I reviewed urinalysis result from 2023 found in the medical record at that time nitrate is positive leukocyte Estrace was positive and many bacteria were seen however there were only 0-2 white blood cells and 0-2 red blood cells seen on the microscopic exam unfortunately there is no culture associated with this test.    HPI/ROS    Radha Schumacher is a 68 y.o. female who presents to the emergency department complaining of low back pain and an episode of shaking.  The patient says that she was diagnosed with a urinary tract infection on  and her urologist just called in a prescription for Omnicef to her pharmacy she did  the medication tonight but has not started on it.  Today the patient developed an episode of shaking and low back discomfort initially on the right and then on both sides so she is come to the emergency department but now all of those symptoms have completely resolved.  She did receive 75 mcg of fentanyl and 4 of Zofran from the EMS crew prior to arrival.  She does not recognize any other exacerbating or alleviating factors or precipitating events.  The patient says that she had a CT scan of the abdomen pelvis as part of the work-up for this on Tuesday just a couple of days ago she had also noticed some blood in her urine.  She has not recorded a fever but did have the episode of shaking.  No chest pain cough or difficulty breathing she is able to tolerate oral intake.    PAST MEDICAL HISTORY   has a past medical history of Anemia, Arthritis, Heart burn, Indigestion, Pain (2018), Psychiatric problem (2018), Unspecified hemorrhagic conditions, and Unspecified urinary incontinence.    SURGICAL HISTORY   has a past surgical history that includes  delivery only (); hysterectomy, total abdominal ();  "tubal ligation (1979); vein stripping (2007); renal scope,biopsy (1976); colonoscopy; endoscopy; umbilical hernia repair (3/28/2011); shoulder decompression arthroscopic (Left, 12/4/2018); and shoulder arthroscopy w/ rotator cuff repair (12/4/2018).    FAMILY HISTORY  Family History   Problem Relation Age of Onset    Hyperlipidemia Mother     Hypertension Mother     Alzheimer's Disease Mother     Diabetes Father     Lung Disease Father     Cancer Father         bladder and prostate    Thyroid Father     Obesity Sister     Obesity Brother        SOCIAL HISTORY  Social History     Tobacco Use    Smoking status: Never    Smokeless tobacco: Never   Vaping Use    Vaping Use: Never used   Substance and Sexual Activity    Alcohol use: Yes     Comment: 4-5 per week    Drug use: No    Sexual activity: Not on file       CURRENT MEDICATIONS  Home Medications    **Home medications have not yet been reviewed for this encounter**         ALLERGIES  Allergies   Allergen Reactions    Iodine Anaphylaxis     IVP dye-anaphylaxis    Sulfa Drugs Hives and Rash    Other Environmental      Seasonal hayfever-runny eyes/nose       PHYSICAL EXAM  VITAL SIGNS: BP (!) 155/69   Pulse 85   Temp 36.5 °C (97.7 °F) (Temporal)   Resp 20   Ht 1.651 m (5' 5\")   Wt 69.4 kg (153 lb)   SpO2 97%   BMI 25.46 kg/m²    Constitutional: Awake lucid verbal well-appearing individual in no distress  Eyes: No erythema discharge or jaundice  Neck: Trachea midline no JVD  Cardiovascular: Regular rate and rhythm  Respiratory: Clear bilaterally with no apparent difficulty breathing  Abdomen: Soft nontender nondistended no rebound guarding or peritoneal findings bowel sounds are normal  Back: No CVA tenderness with percussion  Skin: Warm and dry  Musculoskeletal: No acute bony deformity  Neurologic: Awake lucid verbal moving all extremities without difficulty      DIAGNOSTIC STUDIES / PROCEDURES    LABS  CBC shows white blood cell count of 9.9 hemoglobin is " adequate at 12.5 complete metabolic panel is unremarkable glucose is minimally elevated at 109.  Tonight the urinalysis is negative for nitrite trace positive for leukocyte Estrace there were 2-5 white blood cells no bacteria seen in the microscopic exam I have ordered and add on urine culture    RADIOLOGY  I did review the CT report from September 5 of this year there is no evidence of renal mass or bladder mass no evidence of urinary obstruction.  The radiologist did notice gastric polyps and diverticulosis but not diverticulitis.  There were several small liver cysts or hemangiomas.  Please see the report for details none of these findings seem to explain the patient's symptoms    COURSE & MEDICAL DECISION MAKING    In the emergency department an IV was established and because of the history of recently diagnosed urinary tract infection and now back pain and shaking I was concerned about possible pyelonephritis and the patient was given intravenous ceftriaxone.  The urinalysis results tonight are quite different from the previous results and I think this is a confusing picture it is not entirely clear whether or not the patient has a urinary infection so an add-on urine culture was ordered.  I think at this point in time it is safe for the patient to go home but until cultures are negative I have advised her to continue the antibiotics prescribed by her urologist.  The patient is to call her urologist first thing Monday morning and arrange office recheck during the week and if she feels she is developing new or worsening symptoms she is to return here for recheck    FINAL DIAGNOSIS  1. Bilateral low back pain without sciatica, unspecified chronicity    2. Abnormal urinalysis           Electronically signed by: Alexandro Padron M.D., 9/8/2023 11:05 PM

## 2023-09-10 NOTE — DISCHARGE PLANNING
ER  Note:  Received call from pt saying she saw her positive urine culture in her My Chart and she wants to talk to someone to make sure she is taking the correct antibiotic. RN CM called the culture line. No answer, left voicemail for call back. SOFIA SOTELO spoke to Radha ER Pharmacist. Per Radha, she will contact Pharmacy Resident to reach out to the pt. RN CM called pt. No answer, left voicemail for call back.

## 2023-09-15 ENCOUNTER — HOSPITAL ENCOUNTER (OUTPATIENT)
Dept: RADIOLOGY | Facility: MEDICAL CENTER | Age: 68
End: 2023-09-15
Payer: MEDICARE

## 2023-09-15 DIAGNOSIS — Z12.31 VISIT FOR SCREENING MAMMOGRAM: ICD-10-CM

## 2023-09-15 PROCEDURE — 77063 BREAST TOMOSYNTHESIS BI: CPT

## 2023-09-19 ENCOUNTER — HOSPITAL ENCOUNTER (OUTPATIENT)
Facility: MEDICAL CENTER | Age: 68
End: 2023-09-19
Attending: STUDENT IN AN ORGANIZED HEALTH CARE EDUCATION/TRAINING PROGRAM
Payer: MEDICARE

## 2023-09-19 PROCEDURE — 87086 URINE CULTURE/COLONY COUNT: CPT

## 2023-09-22 LAB
BACTERIA UR CULT: NORMAL
SIGNIFICANT IND 70042: NORMAL
SITE SITE: NORMAL
SOURCE SOURCE: NORMAL

## 2023-10-09 ENCOUNTER — OFFICE VISIT (OUTPATIENT)
Dept: DERMATOLOGY | Facility: IMAGING CENTER | Age: 68
End: 2023-10-09
Payer: MEDICARE

## 2023-10-09 DIAGNOSIS — L57.0 ACTINIC KERATOSIS: ICD-10-CM

## 2023-10-09 PROCEDURE — 17000 DESTRUCT PREMALG LESION: CPT | Performed by: NURSE PRACTITIONER

## 2023-10-09 NOTE — PROGRESS NOTES
DERMATOLOGY NOTE  FOLLOW UP VISIT       Chief complaint: Skin Lesion    HPI/location: lesion on nose peels   Time present: 2-3 mths   Painful lesion: No  Itching lesion: Yes  Enlarging lesion: No  Anything make it better or worse?      History of skin cancer: no  History of precancers/actinic keratoses: Yes, Details: cryo treat at last exam,  2017 approx  History of biopsies:Yes, Details: Benign a[pprox 2012, above RT eyebrown  History of blistering/severe sunburns:Yes, Details: teenager  Family history of skin cancer:No  Family history of atypical moles:No      Allergies   Allergen Reactions    Iodine Anaphylaxis     IVP dye-anaphylaxis    Sulfa Drugs Hives and Rash    Other Environmental      Seasonal hayfever-runny eyes/nose        MEDICATIONS:  Medications relevant to specialty reviewed.     REVIEW OF SYSTEMS:   Positive for skin (see HPI)  Negative for fevers and chills       EXAM:  There were no vitals taken for this visit.  Constitutional: Well-developed, well-nourished, and in no distress.     A focused skin exam was performed including the affected areas of the face. Notable findings on exam today listed below and/or in assessment/plan.     Ill-defined erythematous gritty/scaly papule over the nose/tip of nose    IMPRESSION / PLAN:      1. Actinic keratosis  CRYOTHERAPY:  Risks (including, but not limited to: skin discoloration, redness, blister, blood blister, recurrence, need for further treatment, infection, scar) and benefits of cryotherapy discussed. Patient verbally agreed to proceed with treatment. 1 cryotherapy freeze thaw cycles of 10 seconds were applied to 1 lesion on nose with cryac. Patient tolerated procedure well. Aftercare instructions given--no specific care needed unless irritated during healing process, can apply Vaseline with small band-aid if needed.      Discussed risks associated with LN2, Patient verbalized understanding and agrees with plan regarding the above          Please note  that this dictation was created using voice recognition software. I have made every reasonable attempt to correct obvious errors, but I expect that there are errors of grammar and possibly content that I did not discover before finalizing the note.      Return to clinic in: No follow-ups on file. and as needed for any new or changing skin lesions.

## 2024-01-03 ENCOUNTER — OFFICE VISIT (OUTPATIENT)
Dept: MEDICAL GROUP | Facility: PHYSICIAN GROUP | Age: 69
End: 2024-01-03
Payer: MEDICARE

## 2024-01-03 VITALS
HEART RATE: 72 BPM | BODY MASS INDEX: 25.12 KG/M2 | TEMPERATURE: 98.4 F | SYSTOLIC BLOOD PRESSURE: 118 MMHG | HEIGHT: 65 IN | DIASTOLIC BLOOD PRESSURE: 74 MMHG | OXYGEN SATURATION: 96 % | WEIGHT: 150.8 LBS

## 2024-01-03 DIAGNOSIS — I83.813 VARICOSE VEINS OF BOTH LOWER EXTREMITIES WITH PAIN: ICD-10-CM

## 2024-01-03 PROCEDURE — 3078F DIAST BP <80 MM HG: CPT | Performed by: FAMILY MEDICINE

## 2024-01-03 PROCEDURE — 99213 OFFICE O/P EST LOW 20 MIN: CPT | Performed by: FAMILY MEDICINE

## 2024-01-03 PROCEDURE — 3074F SYST BP LT 130 MM HG: CPT | Performed by: FAMILY MEDICINE

## 2024-01-03 ASSESSMENT — ENCOUNTER SYMPTOMS
FEVER: 0
SHORTNESS OF BREATH: 0
CHILLS: 0

## 2024-01-03 ASSESSMENT — PATIENT HEALTH QUESTIONNAIRE - PHQ9: CLINICAL INTERPRETATION OF PHQ2 SCORE: 0

## 2024-01-03 ASSESSMENT — FIBROSIS 4 INDEX: FIB4 SCORE: 0.92

## 2024-01-03 NOTE — PROGRESS NOTES
"Subjective:     CC: leg pain    HPI:   Radha presents today with    Problem   Varicose Veins of Both Lower Extremities    Chronic.  Bilaterally - behind knees, inner thighs, groin  She's had them for years, presented during 2nd pregnancy (age 23)  In 2006 - had them stripped, focused closer to groin  In last couple of weeks she is getting a burning pain behind knees and inner thighs  If she sits for too long, she'll get the burning pain  No swelling in the legs         Health Maintenance: Completed    ROS:  Review of Systems   Constitutional:  Negative for chills and fever.   Respiratory:  Negative for shortness of breath.    Cardiovascular:  Negative for chest pain.       Objective:     Exam:  /74 (BP Location: Right arm, Patient Position: Sitting, BP Cuff Size: Adult)   Pulse 72   Temp 36.9 °C (98.4 °F) (Temporal)   Ht 1.651 m (5' 5\")   Wt 68.4 kg (150 lb 12.8 oz)   SpO2 96%   BMI 25.09 kg/m²  Body mass index is 25.09 kg/m².    Physical Exam  Constitutional:       Appearance: Normal appearance.   Cardiovascular:      Rate and Rhythm: Normal rate and regular rhythm.      Heart sounds: Normal heart sounds.   Pulmonary:      Effort: Pulmonary effort is normal.      Breath sounds: Normal breath sounds.   Musculoskeletal:      Cervical back: Normal range of motion and neck supple.      Right lower leg: No edema.      Left lower leg: No edema.   Skin:         Neurological:      Mental Status: She is alert.             Assessment & Plan:     68 y.o. female with the following -     1. Varicose veins of both lower extremities with pain  Chronic, uncontrolled.  Since age 23 during the second pregnancy she has developed varicose veins.  They are predominantly located behind bilateral knees, bilateral inner thighs, and into the groin.  She reports in 2006 she did have vein stripping and she reports that the procedure was focused mostly on the groin is at the bulk of her pain.  The last couple weeks she started " getting pain again.  It is a burning pain in the groin and in the inner thighs that is especially painful when she sits for a long time.  She is interested in having the varicose veins treated again to treat the pain so referral has been placed today.  - Referral to Vascular Surgery    Return if symptoms worsen or fail to improve.    Please note that this dictation was created using voice recognition software. I have made every reasonable attempt to correct obvious errors, but I expect that there are errors of grammar and possibly content that I did not discover before finalizing the note.

## 2024-01-05 ENCOUNTER — TELEPHONE (OUTPATIENT)
Dept: MEDICAL GROUP | Facility: PHYSICIAN GROUP | Age: 69
End: 2024-01-05
Payer: MEDICARE

## 2024-01-05 DIAGNOSIS — I83.813 VARICOSE VEINS OF BOTH LOWER EXTREMITIES WITH PAIN: ICD-10-CM

## 2024-01-05 NOTE — TELEPHONE ENCOUNTER
1. Caller Name: Deya Schumacher                        Call Back Number: [phone]      How would the patient prefer to be contacted with a response: [employer] message    2. SPECIFIC Action To Be Taken: Referral pending, please sign./ Pt requested appt via [employer].    3. Diagnosis/Clinical Reason for Request:  congestion and thick mucus in throat.    Comment:  Since covid, June 19... congestion and cough , with thick mucus stuck drainage in throat and ear popping and crackling when I blow my nose along with headache    4. Specialty & Provider Name/Lab/Imaging Location: ENT    5. Is appointment scheduled for requested order/referral: no    Patient was informed they will receive a return phone call from the office ONLY if there are any questions before processing their request. Advised to call back if they haven't received a call from the referral department in 5 days.   Rx sent

## 2024-01-05 NOTE — TELEPHONE ENCOUNTER
VOICEMAIL  1. Caller Name: Radha Schumacher                          Call Back Number: [phone] (home)       2. Message: Patient LVM stating that there was a referral placed for them on 1/3/24 for Vascular surgery to [facility]. [facility] contacted her stating they are not accepting any new patients with varicose veins at this time. Patient would like a new referral.    Please advise.

## 2024-03-12 DIAGNOSIS — F41.9 ANXIETY: ICD-10-CM

## 2024-03-12 RX ORDER — FLUOXETINE HYDROCHLORIDE 20 MG/1
20 CAPSULE ORAL DAILY
Qty: 100 CAPSULE | Refills: 1 | Status: SHIPPED | OUTPATIENT
Start: 2024-03-12

## 2024-03-12 NOTE — TELEPHONE ENCOUNTER
Received request via: Pharmacy    Was the patient seen in the last year in this department? Yes    Does the patient have an active prescription (recently filled or refills available) for medication(s) requested? No    Pharmacy [facility]    Does the patient have long-term Plus and need 100 day supply (blood pressure, diabetes and cholesterol meds only)? Yes, quantity updated to 100 days

## 2024-04-19 ENCOUNTER — TELEPHONE (OUTPATIENT)
Dept: HEALTH INFORMATION MANAGEMENT | Facility: OTHER | Age: 69
End: 2024-04-19

## 2024-05-23 ENCOUNTER — OFFICE VISIT (OUTPATIENT)
Dept: CARDIOLOGY | Facility: MEDICAL CENTER | Age: 69
End: 2024-05-23
Attending: INTERNAL MEDICINE
Payer: MEDICARE

## 2024-05-23 VITALS
WEIGHT: 151 LBS | SYSTOLIC BLOOD PRESSURE: 150 MMHG | HEIGHT: 65 IN | OXYGEN SATURATION: 96 % | HEART RATE: 63 BPM | RESPIRATION RATE: 16 BRPM | DIASTOLIC BLOOD PRESSURE: 80 MMHG | BODY MASS INDEX: 25.16 KG/M2

## 2024-05-23 DIAGNOSIS — I83.813 VARICOSE VEINS OF BOTH LOWER EXTREMITIES WITH PAIN: ICD-10-CM

## 2024-05-23 PROCEDURE — 99203 OFFICE O/P NEW LOW 30 MIN: CPT | Performed by: INTERNAL MEDICINE

## 2024-05-23 PROCEDURE — G2211 COMPLEX E/M VISIT ADD ON: HCPCS | Performed by: INTERNAL MEDICINE

## 2024-05-23 PROCEDURE — 3077F SYST BP >= 140 MM HG: CPT | Performed by: INTERNAL MEDICINE

## 2024-05-23 PROCEDURE — 3079F DIAST BP 80-89 MM HG: CPT | Performed by: INTERNAL MEDICINE

## 2024-05-23 RX ORDER — HYDROCORTISONE ACETATE 0.5 %
300 CREAM (GRAM) TOPICAL
Qty: 180 CAPSULE | Refills: 4 | Status: SHIPPED | OUTPATIENT
Start: 2024-05-23

## 2024-05-23 ASSESSMENT — ENCOUNTER SYMPTOMS
PALPITATIONS: 0
ABDOMINAL PAIN: 0
MYALGIAS: 0
FEVER: 0
WEIGHT LOSS: 0
FALLS: 0
BLOOD IN STOOL: 0
PND: 0
HEADACHES: 0
SPEECH CHANGE: 0
DIZZINESS: 0
HALLUCINATIONS: 0
CHILLS: 0
SHORTNESS OF BREATH: 0
LOSS OF CONSCIOUSNESS: 0
DEPRESSION: 0
SENSORY CHANGE: 0
ORTHOPNEA: 0
BLURRED VISION: 0
BRUISES/BLEEDS EASILY: 0
NAUSEA: 0
EYE PAIN: 0
EYE DISCHARGE: 0
VOMITING: 0
DOUBLE VISION: 0
CLAUDICATION: 0
COUGH: 0

## 2024-05-23 ASSESSMENT — FIBROSIS 4 INDEX: FIB4 SCORE: 0.92

## 2024-05-23 NOTE — PROGRESS NOTES
Chief Complaint   Patient presents with    Varicose Veins       Subjective     Deya Analy Schumacher is a 68 y.o. female who presents today For vascular evaluation of persistent symptoms of lower extremities edema, pain in setting of possible venous insufficiency and reflux disease.  Patient has NOT used compression stockings consistently.  Patient is now seeking for invasive intervention for symptomatic relief and improving quality of life.  + prior vein treatments.  + prior vein stripping surgery.    No family history of sudden cardiac death.    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    Unspecified urinary incontinence      Past Surgical History:   Procedure Laterality Date    SHOULDER DECOMPRESSION ARTHROSCOPIC Left 2018    Procedure: SHOULDER DECOMPRESSION ARTHROSCOPIC - SUBACROMIAL W/LABRAL DEBRIDEMENT;  Surgeon: Anum Santana M.D.;  Location: SURGERY [facility];  Service: Orthopedics    SHOULDER ARTHROSCOPY W/ ROTATOR CUFF REPAIR  2018    Procedure: SHOULDER ARTHROSCOPY W/ ROTATOR CUFF REPAIR - MARS;  Surgeon: Anum Santana M.D.;  Location: SURGERY [facility];  Service: Orthopedics    UMBILICAL HERNIA REPAIR  3/28/2011    Performed by WILLIAM GOLD at SURGERY [facility]    VEIN STRIPPING      HYSTERECTOMY, TOTAL ABDOMINAL  1985    RI  DELIVERY ONLY      TUBAL LIGATION      RI RENAL SCOPE,BIOPSY      COLONOSCOPY      x 2    ENDOSCOPY       Family History   Problem Relation Age of Onset    Hyperlipidemia Mother     Hypertension Mother     Alzheimer's Disease Mother     Diabetes Father     Lung Disease Father     Cancer Father         bladder and prostate    Thyroid Father     Obesity Sister     Obesity Brother      Social History      Socioeconomic History    Marital status:      Spouse name: Not on file    Number of children: Not on file    Years of education: Not on file    Highest education level: 12th grade   Occupational History    Not on file   Tobacco Use    Smoking status: Never    Smokeless tobacco: Never   Vaping Use    Vaping status: Never Used   Substance and Sexual Activity    Alcohol use: Yes     Comment: 4-5 per week    Drug use: No    Sexual activity: Not on file   Other Topics Concern    Not on file   Social History Narrative    Not on file     Social Determinants of Health     Financial Resource Strain: Low Risk  (11/23/2022)    Overall Financial Resource Strain (CARDIA)     Difficulty of Paying Living Expenses: Not hard at all   Food Insecurity: No Food Insecurity (11/23/2022)    Hunger Vital Sign     Worried About Running Out of Food in the Last Year: Never true     Ran Out of Food in the Last Year: Never true   Transportation Needs: No Transportation Needs (11/23/2022)    PRAPARE - Transportation     Lack of Transportation (Medical): No     Lack of Transportation (Non-Medical): No   Physical Activity: Inactive (11/23/2022)    Exercise Vital Sign     Days of Exercise per Week: 0 days     Minutes of Exercise per Session: 0 min   Stress: Stress Concern Present (11/23/2022)    [employer] [place] [employer] - Occupational Stress Questionnaire     Feeling of Stress : Very much   Social Connections: Socially Integrated (11/23/2022)    Social Connection and Isolation Panel [NHANES]     Frequency of Communication with Friends and Family: More than three times a week     Frequency of Social Gatherings with Friends and Family: More than three times a week     Attends Cheondo[employer] Services: More than 4 times per year     Active Member of Clubs or Organizations: Yes     Attends Club or Organization Meetings: More than 4 times per year     Marital Status:    Intimate Partner Violence: Not on file   Housing  Stability: Low Risk  (11/23/2022)    Housing Stability Vital Sign     Unable to Pay for Housing in the Last Year: No     Number of Places Lived in the Last Year: 1     Unstable Housing in the Last Year: No     Allergies   Allergen Reactions    Iodine Anaphylaxis     IVP dye-anaphylaxis    Sulfa Drugs Hives and Rash    Other Environmental      Seasonal hayfever-runny eyes/nose     Outpatient Encounter Medications as of 5/23/2024   Medication Sig Dispense Refill    FLUoxetine (PROZAC) 20 MG Cap Take 1 capsule by mouth once daily 100 Capsule 1    olmesartan (BENICAR) 5 MG tablet TAKE 1 TABLET BY MOUTH ONCE DAILY AS DIRECTED 100 Tablet 1    fexofenadine (ALLEGRA ALLERGY) 180 MG tablet 1 Orally qd for 30 day(s)      Vitamins A & D 5000-400 units Cap 1 Orally qd      famotidine (PEPCID) 40 MG Tab Take 40 mg by mouth.      rabeprazole (ACIPHEX) 20 MG tablet Take 20 mg by mouth every day.      diphenhydrAMINE-APAP, sleep,  MG Tab Take 2 Tabs by mouth every bedtime.      Ferrous Fumarate 325 (106 FE) MG TABS Take 1 Tab by mouth every bedtime.       No facility-administered encounter medications on file as of 5/23/2024.     Review of Systems   Constitutional:  Negative for chills, fever, malaise/fatigue and weight loss.   HENT:  Negative for ear discharge, ear pain, hearing loss and nosebleeds.    Eyes:  Negative for blurred vision, double vision, pain and discharge.   Respiratory:  Negative for cough and shortness of breath.    Cardiovascular:  Positive for leg swelling. Negative for chest pain, palpitations, orthopnea, claudication and PND.   Gastrointestinal:  Negative for abdominal pain, blood in stool, melena, nausea and vomiting.   Genitourinary:  Negative for dysuria and hematuria.   Musculoskeletal:  Negative for falls, joint pain and myalgias.   Skin:  Negative for itching and rash.   Neurological:  Negative for dizziness, sensory change, speech change, loss of consciousness and headaches.  "  Endo/Heme/Allergies:  Negative for environmental allergies. Does not bruise/bleed easily.   Psychiatric/Behavioral:  Negative for depression, hallucinations and suicidal ideas.               Objective     BP (!) 150/80 (BP Location: Left arm, Patient Position: Sitting, BP Cuff Size: Adult)   Pulse 63   Resp 16   Ht 1.651 m (5' 5\")   Wt 68.5 kg (151 lb)   SpO2 96%   BMI 25.13 kg/m²     Physical Exam  Vitals and nursing note reviewed.   Constitutional:       General: She is not in acute distress.     Appearance: She is not diaphoretic.   HENT:      Head: Normocephalic and atraumatic.      Right Ear: External ear normal.      Left Ear: External ear normal.      Nose: No congestion or rhinorrhea.   Eyes:      General:         Right eye: No discharge.         Left eye: No discharge.   Neck:      Thyroid: No thyromegaly.      Vascular: No JVD.   Cardiovascular:      Rate and Rhythm: Normal rate and regular rhythm.      Pulses: Normal pulses.   Pulmonary:      Effort: No respiratory distress.   Abdominal:      General: There is no distension.      Tenderness: There is no abdominal tenderness.   Musculoskeletal:         General: No swelling or tenderness.      Right lower leg: No edema.      Left lower leg: No edema.      Comments: + varicose veins without evidence of ulcer, discoloration.     Skin:     General: Skin is warm and dry.   Neurological:      Mental Status: She is alert and oriented to person, place, and time.      Cranial Nerves: No cranial nerve deficit.   Psychiatric:         Behavior: Behavior normal.                Assessment & Plan     1. Varicose veins of both lower extremities with pain  US-EXTREMITY VENOUS LOWER BILAT          Medical Decision Making: Today's Assessment/Status/Plan:   We will also check lower extremities venous duplex to assess the anatomy for her lower extremities venous system.    At this time, optimize use of compression stockings with at least grading of 20 to 30 mmHg, at " least knee-high.  Thigh-high length is ideal.    Trial of horse chest nut.    This visit encounter signifies the visit complexity inherent to evaluation and management associated with medical care services that serve as the continuing focal point for all needed health care services and/or with medical care services that are part of ongoing care related to this patient's single, serious condition, complex cardiac condition.    Walter Soto M.D.

## 2024-06-04 ENCOUNTER — APPOINTMENT (OUTPATIENT)
Dept: CARDIOLOGY | Facility: MEDICAL CENTER | Age: 69
End: 2024-06-04
Attending: INTERNAL MEDICINE
Payer: MEDICARE

## 2024-06-22 ENCOUNTER — HOSPITAL ENCOUNTER (OUTPATIENT)
Dept: LAB | Facility: MEDICAL CENTER | Age: 69
End: 2024-06-22
Attending: INTERNAL MEDICINE
Payer: MEDICARE

## 2024-06-22 LAB
ALBUMIN SERPL BCP-MCNC: 4.1 G/DL (ref 3.2–4.9)
ALBUMIN/GLOB SERPL: 1.8 G/DL
ALP SERPL-CCNC: 90 U/L (ref 30–99)
ALT SERPL-CCNC: 16 U/L (ref 2–50)
ANION GAP SERPL CALC-SCNC: 10 MMOL/L (ref 7–16)
APPEARANCE UR: CLEAR
AST SERPL-CCNC: 24 U/L (ref 12–45)
BACTERIA #/AREA URNS HPF: NEGATIVE /HPF
BASOPHILS # BLD AUTO: 1.3 % (ref 0–1.8)
BASOPHILS # BLD: 0.07 K/UL (ref 0–0.12)
BILIRUB SERPL-MCNC: 0.3 MG/DL (ref 0.1–1.5)
BILIRUB UR QL STRIP.AUTO: NEGATIVE
BUN SERPL-MCNC: 12 MG/DL (ref 8–22)
CALCIUM ALBUM COR SERPL-MCNC: 8.7 MG/DL (ref 8.5–10.5)
CALCIUM SERPL-MCNC: 8.8 MG/DL (ref 8.5–10.5)
CHLORIDE SERPL-SCNC: 106 MMOL/L (ref 96–112)
CHOLEST SERPL-MCNC: 160 MG/DL (ref 100–199)
CO2 SERPL-SCNC: 26 MMOL/L (ref 20–33)
COLOR UR: YELLOW
CREAT SERPL-MCNC: 0.8 MG/DL (ref 0.5–1.4)
EOSINOPHIL # BLD AUTO: 0.12 K/UL (ref 0–0.51)
EOSINOPHIL NFR BLD: 2.2 % (ref 0–6.9)
EPI CELLS #/AREA URNS HPF: NEGATIVE /HPF
ERYTHROCYTE [DISTWIDTH] IN BLOOD BY AUTOMATED COUNT: 44.2 FL (ref 35.9–50)
FASTING STATUS PATIENT QL REPORTED: NORMAL
FERRITIN SERPL-MCNC: 189 NG/ML (ref 10–291)
GFR SERPLBLD CREATININE-BSD FMLA CKD-EPI: 80 ML/MIN/1.73 M 2
GLOBULIN SER CALC-MCNC: 2.3 G/DL (ref 1.9–3.5)
GLUCOSE SERPL-MCNC: 94 MG/DL (ref 65–99)
GLUCOSE UR STRIP.AUTO-MCNC: NEGATIVE MG/DL
HCT VFR BLD AUTO: 39.7 % (ref 37–47)
HDLC SERPL-MCNC: 51 MG/DL
HGB BLD-MCNC: 12.8 G/DL (ref 12–16)
HYALINE CASTS #/AREA URNS LPF: NORMAL /LPF
IMM GRANULOCYTES # BLD AUTO: 0.01 K/UL (ref 0–0.11)
IMM GRANULOCYTES NFR BLD AUTO: 0.2 % (ref 0–0.9)
IRON SERPL-MCNC: 55 UG/DL (ref 40–170)
KETONES UR STRIP.AUTO-MCNC: NEGATIVE MG/DL
LDLC SERPL CALC-MCNC: 88 MG/DL
LEUKOCYTE ESTERASE UR QL STRIP.AUTO: NEGATIVE
LYMPHOCYTES # BLD AUTO: 1.99 K/UL (ref 1–4.8)
LYMPHOCYTES NFR BLD: 36.9 % (ref 22–41)
MAGNESIUM SERPL-MCNC: 2.4 MG/DL (ref 1.5–2.5)
MCH RBC QN AUTO: 30.5 PG (ref 27–33)
MCHC RBC AUTO-ENTMCNC: 32.2 G/DL (ref 32.2–35.5)
MCV RBC AUTO: 94.7 FL (ref 81.4–97.8)
MICRO URNS: ABNORMAL
MONOCYTES # BLD AUTO: 0.37 K/UL (ref 0–0.85)
MONOCYTES NFR BLD AUTO: 6.9 % (ref 0–13.4)
NEUTROPHILS # BLD AUTO: 2.83 K/UL (ref 1.82–7.42)
NEUTROPHILS NFR BLD: 52.5 % (ref 44–72)
NITRITE UR QL STRIP.AUTO: NEGATIVE
NRBC # BLD AUTO: 0 K/UL
NRBC BLD-RTO: 0 /100 WBC (ref 0–0.2)
PH UR STRIP.AUTO: 7 [PH] (ref 5–8)
PHOSPHATE SERPL-MCNC: 3.4 MG/DL (ref 2.5–4.5)
PLATELET # BLD AUTO: 361 K/UL (ref 164–446)
PMV BLD AUTO: 9.8 FL (ref 9–12.9)
POTASSIUM SERPL-SCNC: 5.3 MMOL/L (ref 3.6–5.5)
PROT SERPL-MCNC: 6.4 G/DL (ref 6–8.2)
PROT UR QL STRIP: NEGATIVE MG/DL
RBC # BLD AUTO: 4.19 M/UL (ref 4.2–5.4)
RBC # URNS HPF: NORMAL /HPF
RBC UR QL AUTO: ABNORMAL
SODIUM SERPL-SCNC: 142 MMOL/L (ref 135–145)
SP GR UR STRIP.AUTO: 1.01
TRIGL SERPL-MCNC: 107 MG/DL (ref 0–149)
TSH SERPL DL<=0.005 MIU/L-ACNC: 2.27 UIU/ML (ref 0.38–5.33)
URATE SERPL-MCNC: 3.4 MG/DL (ref 1.9–8.2)
UROBILINOGEN UR STRIP.AUTO-MCNC: 0.2 MG/DL
WBC # BLD AUTO: 5.4 K/UL (ref 4.8–10.8)
WBC #/AREA URNS HPF: NORMAL /HPF

## 2024-06-22 PROCEDURE — 80053 COMPREHEN METABOLIC PANEL: CPT

## 2024-06-22 PROCEDURE — 83540 ASSAY OF IRON: CPT

## 2024-06-22 PROCEDURE — 85025 COMPLETE CBC W/AUTO DIFF WBC: CPT

## 2024-06-22 PROCEDURE — 84443 ASSAY THYROID STIM HORMONE: CPT

## 2024-06-22 PROCEDURE — 84550 ASSAY OF BLOOD/URIC ACID: CPT

## 2024-06-22 PROCEDURE — 83735 ASSAY OF MAGNESIUM: CPT

## 2024-06-22 PROCEDURE — 81001 URINALYSIS AUTO W/SCOPE: CPT

## 2024-06-22 PROCEDURE — 82728 ASSAY OF FERRITIN: CPT

## 2024-06-22 PROCEDURE — 80061 LIPID PANEL: CPT

## 2024-06-22 PROCEDURE — 36415 COLL VENOUS BLD VENIPUNCTURE: CPT

## 2024-06-22 PROCEDURE — 84100 ASSAY OF PHOSPHORUS: CPT

## 2024-08-06 RX ORDER — OLMESARTAN MEDOXOMIL 5 MG/1
TABLET ORAL
Qty: 100 TABLET | Refills: 0 | Status: SHIPPED | OUTPATIENT
Start: 2024-08-06

## 2024-08-06 NOTE — TELEPHONE ENCOUNTER
Received request via: Pharmacy    Was the patient seen in the last year in this department? Yes    Does the patient have an active prescription (recently filled or refills available) for medication(s) requested? No    Pharmacy Name: [facility]    Does the patient have senior living Plus and need 100 day supply (blood pressure, diabetes and cholesterol meds only)? Yes, quantity updated to 100 days

## 2024-09-16 ENCOUNTER — HOSPITAL ENCOUNTER (OUTPATIENT)
Dept: RADIOLOGY | Facility: MEDICAL CENTER | Age: 69
End: 2024-09-16
Payer: MEDICARE

## 2024-09-16 DIAGNOSIS — Z12.31 VISIT FOR SCREENING MAMMOGRAM: ICD-10-CM

## 2024-09-16 PROCEDURE — 77067 SCR MAMMO BI INCL CAD: CPT

## 2024-09-20 DIAGNOSIS — F41.9 ANXIETY: ICD-10-CM

## 2024-09-20 NOTE — TELEPHONE ENCOUNTER
Received request via: Pharmacy    Was the patient seen in the last year in this department? No    Does the patient have an active prescription (recently filled or refills available) for medication(s) requested? No    Pharmacy Name: [facility]    Does the patient have detention Plus and need 100-day supply? (This applies to ALL medications) Patient does not have SCP

## 2024-11-11 RX ORDER — OLMESARTAN MEDOXOMIL 5 MG/1
TABLET ORAL
Qty: 100 TABLET | Refills: 0 | Status: SHIPPED | OUTPATIENT
Start: 2024-11-11

## 2024-11-11 NOTE — TELEPHONE ENCOUNTER
Received request via: Pharmacy    Was the patient seen in the last year in this department? Yes    Does the patient have an active prescription (recently filled or refills available) for medication(s) requested? No    Pharmacy Name: [facility]  [address] [facility]    Does the patient have [facility] Plus and need 100-day supply? (This applies to ALL medications) Yes, quantity updated to 100 days

## 2024-11-19 SDOH — ECONOMIC STABILITY: INCOME INSECURITY: HOW HARD IS IT FOR YOU TO PAY FOR THE VERY BASICS LIKE FOOD, HOUSING, MEDICAL CARE, AND HEATING?: NOT VERY HARD

## 2024-11-19 SDOH — ECONOMIC STABILITY: FOOD INSECURITY: WITHIN THE PAST 12 MONTHS, THE FOOD YOU BOUGHT JUST DIDN'T LAST AND YOU DIDN'T HAVE MONEY TO GET MORE.: NEVER TRUE

## 2024-11-19 SDOH — HEALTH STABILITY: PHYSICAL HEALTH: ON AVERAGE, HOW MANY DAYS PER WEEK DO YOU ENGAGE IN MODERATE TO STRENUOUS EXERCISE (LIKE A BRISK WALK)?: 5 DAYS

## 2024-11-19 SDOH — HEALTH STABILITY: MENTAL HEALTH
STRESS IS WHEN SOMEONE FEELS TENSE, NERVOUS, ANXIOUS, OR CAN'T SLEEP AT NIGHT BECAUSE THEIR MIND IS TROUBLED. HOW STRESSED ARE YOU?: TO SOME EXTENT

## 2024-11-19 SDOH — ECONOMIC STABILITY: FOOD INSECURITY: WITHIN THE PAST 12 MONTHS, YOU WORRIED THAT YOUR FOOD WOULD RUN OUT BEFORE YOU GOT MONEY TO BUY MORE.: NEVER TRUE

## 2024-11-19 SDOH — HEALTH STABILITY: PHYSICAL HEALTH: ON AVERAGE, HOW MANY MINUTES DO YOU ENGAGE IN EXERCISE AT THIS LEVEL?: 20 MIN

## 2024-11-19 SDOH — ECONOMIC STABILITY: INCOME INSECURITY: IN THE LAST 12 MONTHS, WAS THERE A TIME WHEN YOU WERE NOT ABLE TO PAY THE MORTGAGE OR RENT ON TIME?: NO

## 2024-11-19 ASSESSMENT — SOCIAL DETERMINANTS OF HEALTH (SDOH)
IN A TYPICAL WEEK, HOW MANY TIMES DO YOU TALK ON THE PHONE WITH FAMILY, FRIENDS, OR NEIGHBORS?: MORE THAN THREE TIMES A WEEK
HOW OFTEN DO YOU HAVE SIX OR MORE DRINKS ON ONE OCCASION: NEVER
WITHIN THE PAST 12 MONTHS, YOU WORRIED THAT YOUR FOOD WOULD RUN OUT BEFORE YOU GOT THE MONEY TO BUY MORE: NEVER TRUE
HOW OFTEN DO YOU GET TOGETHER WITH FRIENDS OR RELATIVES?: MORE THAN THREE TIMES A WEEK
IN THE PAST 12 MONTHS, HAS THE ELECTRIC, GAS, OIL, OR WATER COMPANY THREATENED TO SHUT OFF SERVICE IN YOUR HOME?: NO
DO YOU BELONG TO ANY CLUBS OR ORGANIZATIONS SUCH AS CHURCH GROUPS UNIONS, FRATERNAL OR ATHLETIC GROUPS, OR SCHOOL GROUPS?: YES
HOW OFTEN DO YOU ATTENT MEETINGS OF THE CLUB OR ORGANIZATION YOU BELONG TO?: MORE THAN 4 TIMES PER YEAR
IN A TYPICAL WEEK, HOW MANY TIMES DO YOU TALK ON THE PHONE WITH FAMILY, FRIENDS, OR NEIGHBORS?: MORE THAN THREE TIMES A WEEK
DO YOU BELONG TO ANY CLUBS OR ORGANIZATIONS SUCH AS CHURCH GROUPS UNIONS, FRATERNAL OR ATHLETIC GROUPS, OR SCHOOL GROUPS?: YES
HOW OFTEN DO YOU ATTENT MEETINGS OF THE CLUB OR ORGANIZATION YOU BELONG TO?: MORE THAN 4 TIMES PER YEAR
HOW OFTEN DO YOU ATTEND CHURCH OR RELIGIOUS SERVICES?: MORE THAN 4 TIMES PER YEAR
HOW MANY DRINKS CONTAINING ALCOHOL DO YOU HAVE ON A TYPICAL DAY WHEN YOU ARE DRINKING: 1 OR 2
HOW HARD IS IT FOR YOU TO PAY FOR THE VERY BASICS LIKE FOOD, HOUSING, MEDICAL CARE, AND HEATING?: NOT VERY HARD
HOW OFTEN DO YOU ATTEND CHURCH OR RELIGIOUS SERVICES?: MORE THAN 4 TIMES PER YEAR
HOW OFTEN DO YOU GET TOGETHER WITH FRIENDS OR RELATIVES?: MORE THAN THREE TIMES A WEEK
HOW OFTEN DO YOU HAVE A DRINK CONTAINING ALCOHOL: 4 OR MORE TIMES A WEEK

## 2024-11-19 ASSESSMENT — LIFESTYLE VARIABLES
HOW OFTEN DO YOU HAVE A DRINK CONTAINING ALCOHOL: 4 OR MORE TIMES A WEEK
SKIP TO QUESTIONS 9-10: 1
HOW OFTEN DO YOU HAVE SIX OR MORE DRINKS ON ONE OCCASION: NEVER
AUDIT-C TOTAL SCORE: 4
HOW MANY STANDARD DRINKS CONTAINING ALCOHOL DO YOU HAVE ON A TYPICAL DAY: 1 OR 2

## 2024-11-20 ENCOUNTER — APPOINTMENT (OUTPATIENT)
Dept: MEDICAL GROUP | Facility: PHYSICIAN GROUP | Age: 69
End: 2024-11-20
Payer: MEDICARE

## 2024-11-20 VITALS
DIASTOLIC BLOOD PRESSURE: 74 MMHG | OXYGEN SATURATION: 70 % | HEIGHT: 65 IN | TEMPERATURE: 97.7 F | SYSTOLIC BLOOD PRESSURE: 120 MMHG | WEIGHT: 147.6 LBS | BODY MASS INDEX: 24.59 KG/M2 | HEART RATE: 72 BPM | RESPIRATION RATE: 16 BRPM

## 2024-11-20 DIAGNOSIS — R29.6 FALLS: ICD-10-CM

## 2024-11-20 DIAGNOSIS — S06.0X9D CONCUSSION WITH LOSS OF CONSCIOUSNESS, SUBSEQUENT ENCOUNTER: ICD-10-CM

## 2024-11-20 DIAGNOSIS — Z00.00 WELLNESS EXAMINATION: ICD-10-CM

## 2024-11-20 PROCEDURE — 99397 PER PM REEVAL EST PAT 65+ YR: CPT

## 2024-11-20 PROCEDURE — 3078F DIAST BP <80 MM HG: CPT

## 2024-11-20 PROCEDURE — 99213 OFFICE O/P EST LOW 20 MIN: CPT | Mod: 25

## 2024-11-20 PROCEDURE — 3074F SYST BP LT 130 MM HG: CPT

## 2024-11-20 ASSESSMENT — FIBROSIS 4 INDEX: FIB4 SCORE: 1.15

## 2024-11-20 NOTE — PROGRESS NOTES
Subjective:     CC:   Chief Complaint   Patient presents with    Annual Exam     Went to [place] last week. Went to [facility], records in chart       HPI:   Radha Schumacher is a 69 y.o. female who presents for annual exam. She would also like to discuss a fall she had last week with closed head injury.     History of Present Illness  The patient presents for evaluation of multiple medical concerns.    She experienced a fall in a parking lot, resulting in a head injury. Despite the fall, she was able to drive home. She reports occasional headaches, particularly when moving excessively, and has been managing these with Aleve twice daily. She also experiences pain when opening her mouth fully, which was severe enough to prevent her from eating the day after the fall. She reports no visual disturbances such as blurred or double vision. She has not been using her hearing aid due to ear soreness following the fall. She has a history of falls, including one incident where she tripped over her dog. She maintains a regular exercise routine and follows a healthy diet.            Patient has GYN provider: No   Last Pap Smear: >10 years, hysterectomy age 31   H/O Abnormal Pap: Yes,    Last Mammogram:   Last Bone Density Test:   Last Colorectal Cancer Screening: UTD  Last Tdap: YTD  Received HPV series: Aged out    Exercise: moderate regular exercise, aerobic < 3 days a week  Diet: Healthy      No LMP recorded. Patient has had a hysterectomy.  She has utilized hormone replacement therapy.  Denies hot flashes, night sweats, sleep disruption, mood changes, and vaginal dryness  No significant bloating/fluid retention, pelvic pain, or dyspareunia. No abnormal vaginal discharge.   No breast tenderness, mass, nipple discharge or changes in size or contour.    OB History    Para Term  AB Living   2 2 0 0 0 0   SAB IAB Ectopic Molar Multiple Live Births   0 0 0 0 0 0      She  has no history on file for sexual  activity.    She  has a past medical history of Anemia, Arthritis, Heart burn, Indigestion, Pain (2018), Psychiatric problem (2018), Unspecified hemorrhagic conditions, and Unspecified urinary incontinence.  She  has a past surgical history that includes pr  delivery only (); hysterectomy, total abdominal (); tubal ligation (); vein stripping (); pr renal scope,biopsy (); colonoscopy; endoscopy; umbilical hernia repair (3/28/2011); shoulder decompression arthroscopic (Left, 2018); and shoulder arthroscopy w/ rotator cuff repair (2018).    Family History   Problem Relation Age of Onset    Hyperlipidemia Mother     Hypertension Mother     Alzheimer's Disease Mother     Diabetes Father     Lung Disease Father     Cancer Father         bladder and prostate    Thyroid Father     Obesity Sister     Obesity Brother      Social History     Tobacco Use    Smoking status: Never    Smokeless tobacco: Never   Vaping Use    Vaping status: Never Used   Substance Use Topics    Alcohol use: Yes     Comment: 4-5 per week    Drug use: No       Patient Active Problem List    Diagnosis Date Noted    Falls 2024    Flank pain 2023    Hematuria 2023    Urinary frequency 2023    Dyslipidemia 2023    History of Lyme disease 2023    Primary hypertension 2022    Postnasal drip 10/19/2021    Hearing difficulty of both ears 10/19/2021    Seasonal allergies 2021    Varicose veins of both lower extremities 2021    Hordeolum externum of left lower eyelid 2021    Body mass index 26.0-26.9, adult 2021    Injury of left knee 2021    Injury of right knee 2020    Iron deficiency 2020    Osteopenia of multiple sites 2020    Anxiety 2018    GERD (gastroesophageal reflux disease) 10/03/2016     Current Outpatient Medications   Medication Sig Dispense Refill    olmesartan (BENICAR) 5 MG tablet TAKE 1 TABLET BY MOUTH  "ONCE DAILY AS DIRECTED 100 Tablet 0    FLUoxetine (PROZAC) 20 MG Cap Take 1 capsule by mouth once daily 100 Capsule 0    fexofenadine (ALLEGRA ALLERGY) 180 MG tablet 1 Orally qd for 30 day(s)      Vitamins A & D 5000-400 units Cap 1 Orally qd      famotidine (PEPCID) 40 MG Tab Take 40 mg by mouth.      rabeprazole (ACIPHEX) 20 MG tablet Take 20 mg by mouth every day.      diphenhydrAMINE-APAP, sleep,  MG Tab Take 2 Tabs by mouth every bedtime.      Ferrous Fumarate 325 (106 FE) MG TABS Take 1 Tab by mouth every bedtime.       No current facility-administered medications for this visit.     Allergies   Allergen Reactions    Iodine Anaphylaxis     IVP dye-anaphylaxis    Sulfa Drugs Hives and Rash    Other Environmental      Seasonal hayfever-runny eyes/nose       Review of Systems   Constitutional: Negative for fever, chills and malaise/fatigue.   HENT: Negative for congestion.    Eyes: Negative for pain.   Respiratory: Negative for cough and shortness of breath.    Cardiovascular: Negative for chest pain and leg swelling.   Gastrointestinal: Negative for nausea, vomiting, abdominal pain and diarrhea.   Genitourinary: Negative for dysuria and hematuria.   Skin: Negative for rash.   Neurological: Negative for dizziness, focal weakness and headaches.   Endo/Heme/Allergies: Does not bruise/bleed easily.   Psychiatric/Behavioral: Negative for depression.  The patient is not nervous/anxious.      Objective:   /74 (BP Location: Left arm, Patient Position: Sitting, BP Cuff Size: Adult)   Pulse 72   Temp 36.5 °C (97.7 °F) (Temporal)   Resp 16   Ht 1.651 m (5' 5\")   Wt 67 kg (147 lb 9.6 oz)   SpO2 (!) 70%   BMI 24.56 kg/m²     Wt Readings from Last 4 Encounters:   11/20/24 67 kg (147 lb 9.6 oz)   05/23/24 68.5 kg (151 lb)   01/03/24 68.4 kg (150 lb 12.8 oz)   09/08/23 69.4 kg (153 lb)       A chaperone was offered to the patient during today's exam. Patient declined chaperone.    Physical " Exam:\  Constitutional: Well-developed and well-nourished. Not diaphoretic. No distress.   Skin: Skin is warm and dry. No rash noted.  Head: Atraumatic without lesions.  Eyes: Conjunctivae and extraocular motions are normal. Pupils are equal, round, and reactive to light. No scleral icterus.   Ears:  External ears unremarkable. Tympanic membranes clear and intact. Scabbing and surface abrasion to left auricle superior region.  Nose: Nares patent. Septum midline. Turbinates without erythema nor edema. No discharge.   Mouth/Throat: Tongue normal. Oropharynx is clear and moist. Posterior pharynx without erythema or exudates.  Neck: Supple, trachea midline. Normal range of motion. No thyromegaly present. No lymphadenopathy--cervical or supraclavicular.  Cardiovascular: Regular rate and rhythm, S1 and S2 without murmur, rubs, or gallops.    Respiratory: Effort normal. Clear to auscultation throughout. No adventitious sounds.   Abdomen: Soft, non tender, and without distention. Active bowel sounds in all four quadrants. No rebound, guarding, masses or HSM.  Extremities: No cyanosis, clubbing, erythema, nor edema. Distal pulses intact and symmetric.   Musculoskeletal: All major joints AROM full in all directions, however gait antalgic after fall with pain to left hip, otherwise hip exam is normal with exception of bruising to left lateral thigh  Neurological: Alert and oriented x 3. Grossly non-focal. Strength and sensation grossly intact. DTRs 2+/3 and symmetric. CN 2-11 intact.  Psychiatric:  Behavior, mood, and affect are appropriate.    Assessment and Plan:     1. Wellness examination  - Comp Metabolic Panel; Future  - Lipid Profile; Future    2. Concussion with loss of consciousness, subsequent encounter  - CT-HEAD W/O; Future    3. Falls    Assessment & Plan  1. Post-concussion syndrome.  She experienced a fall resulting in a head injury. Initial CT scan of the head showed no bleeding. She reports occasional  headaches when moving around too much and feeling very tired. She has been taking two Aleve twice a day for pain. A repeat CT scan of the head is optional but recommended if symptoms worsen, such as worsening headache, visual symptoms, or confusion. She is advised to apply ice or heat to her hip and jaw for pain relief. If there is no improvement within 4 weeks, a follow-up appointment is advised.    2. Left hip pain.  She reports persistent pain in her hip following the fall. X-rays taken at the ER were negative. She is advised to apply ice or heat to the hip for pain relief and to take it easy for the next couple of weeks. If symptoms persist or worsen, further evaluation may be necessary.    3. Left ear abrasion.  She has an abrasion on her left ear causing soreness, preventing her from wearing her hearing aid. The abrasion is healing well with no signs of infection. She is advised to monitor for any signs of infection and to avoid wearing the hearing aid until the soreness subsides.    4. Left leg bruising.  She has diffuse bruising on her left leg from the fall. The bruising is healing, and she is advised to monitor for any signs of worsening or complications.    5. Jaw pain.  She reports pain in her jaw when opening her mouth, likely due to the fall. She is advised to apply heat to the jaw to help with pain relief and muscle relaxation. If the pain persists, further evaluation may be necessary.    6. Health Maintenance.  She had labs done in June 2024, which were all normal. A metabolic panel and lipid panel will be conducted. She had a mammogram in September 2024, which was normal. She is due for a bone density scan as her last one was in 2020, showing osteopenia in the spine and femur.    Follow-up  Return in 4 weeks if there is no improvement.        Health maintenance:  Declines vaccines   Labs per orders  Patient counseled about skin care, diet, supplements, and exercise.  Discussed  breast self exam,  mammography screening, menopause, osteoporosis, adequate intake of calcium and vitamin D, diet and exercise, colorectal cancer screening     Follow-up: Return in about 3 months (around 2/20/2025), or if symptoms worsen or fail to improve.    I discussed with the patient the likelihood of costs associated with double billing for an acute or chronic problem & Medicare Wellness Visit. Patient is aware they may receive a bill for additional services and/or copayment.

## 2024-12-27 DIAGNOSIS — F41.9 ANXIETY: ICD-10-CM

## 2024-12-27 NOTE — TELEPHONE ENCOUNTER
Received request via: Pharmacy    Was the patient seen in the last year in this department? Yes    Does the patient have an active prescription (recently filled or refills available) for medication(s) requested? No    Pharmacy Name:     Does the patient have intermediate Plus and need 100-day supply? (This applies to ALL medications)

## 2025-02-14 ENCOUNTER — HOSPITAL ENCOUNTER (OUTPATIENT)
Dept: LAB | Facility: MEDICAL CENTER | Age: 70
End: 2025-02-14
Attending: INTERNAL MEDICINE
Payer: MEDICARE

## 2025-02-14 LAB
APPEARANCE UR: CLEAR
BACTERIA #/AREA URNS HPF: NORMAL /HPF
BASOPHILS # BLD AUTO: 1.1 % (ref 0–1.8)
BASOPHILS # BLD: 0.05 K/UL (ref 0–0.12)
BILIRUB UR QL STRIP.AUTO: NEGATIVE
CASTS URNS QL MICRO: NORMAL /LPF (ref 0–2)
COLOR UR: YELLOW
EOSINOPHIL # BLD AUTO: 0.04 K/UL (ref 0–0.51)
EOSINOPHIL NFR BLD: 0.9 % (ref 0–6.9)
EPITHELIAL CELLS 1715: NORMAL /HPF (ref 0–5)
ERYTHROCYTE [DISTWIDTH] IN BLOOD BY AUTOMATED COUNT: 41.4 FL (ref 35.9–50)
GLUCOSE UR STRIP.AUTO-MCNC: NEGATIVE MG/DL
HCT VFR BLD AUTO: 38.8 % (ref 37–47)
HGB BLD-MCNC: 12.9 G/DL (ref 12–16)
IMM GRANULOCYTES # BLD AUTO: 0.01 K/UL (ref 0–0.11)
IMM GRANULOCYTES NFR BLD AUTO: 0.2 % (ref 0–0.9)
KETONES UR STRIP.AUTO-MCNC: NEGATIVE MG/DL
LEUKOCYTE ESTERASE UR QL STRIP.AUTO: NEGATIVE
LYMPHOCYTES # BLD AUTO: 1.81 K/UL (ref 1–4.8)
LYMPHOCYTES NFR BLD: 41.4 % (ref 22–41)
MCH RBC QN AUTO: 30.4 PG (ref 27–33)
MCHC RBC AUTO-ENTMCNC: 33.2 G/DL (ref 32.2–35.5)
MCV RBC AUTO: 91.3 FL (ref 81.4–97.8)
MICRO URNS: ABNORMAL
MONOCYTES # BLD AUTO: 0.34 K/UL (ref 0–0.85)
MONOCYTES NFR BLD AUTO: 7.8 % (ref 0–13.4)
NEUTROPHILS # BLD AUTO: 2.12 K/UL (ref 1.82–7.42)
NEUTROPHILS NFR BLD: 48.6 % (ref 44–72)
NITRITE UR QL STRIP.AUTO: NEGATIVE
NRBC # BLD AUTO: 0 K/UL
NRBC BLD-RTO: 0 /100 WBC (ref 0–0.2)
PH UR STRIP.AUTO: 7.5 [PH] (ref 5–8)
PLATELET # BLD AUTO: 358 K/UL (ref 164–446)
PMV BLD AUTO: 9.3 FL (ref 9–12.9)
PROT UR QL STRIP: NEGATIVE MG/DL
RBC # BLD AUTO: 4.25 M/UL (ref 4.2–5.4)
RBC # URNS HPF: NORMAL /HPF (ref 0–2)
RBC UR QL AUTO: ABNORMAL
SP GR UR STRIP.AUTO: 1.01
UROBILINOGEN UR STRIP.AUTO-MCNC: 0.2 EU/DL
WBC # BLD AUTO: 4.4 K/UL (ref 4.8–10.8)
WBC #/AREA URNS HPF: NORMAL /HPF

## 2025-02-14 PROCEDURE — 81001 URINALYSIS AUTO W/SCOPE: CPT

## 2025-02-14 PROCEDURE — 83735 ASSAY OF MAGNESIUM: CPT

## 2025-02-14 PROCEDURE — 80061 LIPID PANEL: CPT

## 2025-02-14 PROCEDURE — 83540 ASSAY OF IRON: CPT

## 2025-02-14 PROCEDURE — 80053 COMPREHEN METABOLIC PANEL: CPT

## 2025-02-14 PROCEDURE — 84550 ASSAY OF BLOOD/URIC ACID: CPT

## 2025-02-14 PROCEDURE — 85025 COMPLETE CBC W/AUTO DIFF WBC: CPT

## 2025-02-14 PROCEDURE — 84443 ASSAY THYROID STIM HORMONE: CPT

## 2025-02-14 PROCEDURE — 36415 COLL VENOUS BLD VENIPUNCTURE: CPT

## 2025-02-15 LAB
ALBUMIN SERPL BCP-MCNC: 4.2 G/DL (ref 3.2–4.9)
ALBUMIN/GLOB SERPL: 1.6 G/DL
ALP SERPL-CCNC: 80 U/L (ref 30–99)
ALT SERPL-CCNC: 22 U/L (ref 2–50)
ANION GAP SERPL CALC-SCNC: 11 MMOL/L (ref 7–16)
AST SERPL-CCNC: 20 U/L (ref 12–45)
BILIRUB SERPL-MCNC: 0.3 MG/DL (ref 0.1–1.5)
BUN SERPL-MCNC: 15 MG/DL (ref 8–22)
CALCIUM ALBUM COR SERPL-MCNC: 9.2 MG/DL (ref 8.5–10.5)
CALCIUM SERPL-MCNC: 9.4 MG/DL (ref 8.5–10.5)
CHLORIDE SERPL-SCNC: 104 MMOL/L (ref 96–112)
CHOLEST SERPL-MCNC: 187 MG/DL (ref 100–199)
CO2 SERPL-SCNC: 27 MMOL/L (ref 20–33)
CREAT SERPL-MCNC: 0.88 MG/DL (ref 0.5–1.4)
FASTING STATUS PATIENT QL REPORTED: NORMAL
GFR SERPLBLD CREATININE-BSD FMLA CKD-EPI: 71 ML/MIN/1.73 M 2
GLOBULIN SER CALC-MCNC: 2.6 G/DL (ref 1.9–3.5)
GLUCOSE SERPL-MCNC: 82 MG/DL (ref 65–99)
HDLC SERPL-MCNC: 43 MG/DL
IRON SERPL-MCNC: 87 UG/DL (ref 40–170)
LDLC SERPL CALC-MCNC: 101 MG/DL
MAGNESIUM SERPL-MCNC: 2.1 MG/DL (ref 1.5–2.5)
POTASSIUM SERPL-SCNC: 4.9 MMOL/L (ref 3.6–5.5)
PROT SERPL-MCNC: 6.8 G/DL (ref 6–8.2)
SODIUM SERPL-SCNC: 142 MMOL/L (ref 135–145)
TRIGL SERPL-MCNC: 215 MG/DL (ref 0–149)
TSH SERPL-ACNC: 1.91 UIU/ML (ref 0.35–5.5)
URATE SERPL-MCNC: 3.3 MG/DL (ref 1.9–8.2)

## 2025-02-18 RX ORDER — OLMESARTAN MEDOXOMIL 5 MG/1
TABLET ORAL
Qty: 100 TABLET | Refills: 2 | Status: SHIPPED | OUTPATIENT
Start: 2025-02-18

## 2025-02-18 NOTE — TELEPHONE ENCOUNTER
Received request via: Pharmacy    Was the patient seen in the last year in this department? Yes    Does the patient have an active prescription (recently filled or refills available) for medication(s) requested? No    Pharmacy Name: [facility] [address]    Does the patient have half-way Plus and need 100-day supply? (This applies to ALL medications) Yes, quantity updated to 100 days

## 2025-02-26 ENCOUNTER — HOSPITAL ENCOUNTER (OUTPATIENT)
Dept: LAB | Facility: MEDICAL CENTER | Age: 70
End: 2025-02-26
Attending: INTERNAL MEDICINE
Payer: MEDICARE

## 2025-02-26 LAB
ALBUMIN SERPL BCP-MCNC: 4.1 G/DL (ref 3.2–4.9)
ALBUMIN/GLOB SERPL: 1.6 G/DL
ALP SERPL-CCNC: 70 U/L (ref 30–99)
ALT SERPL-CCNC: 37 U/L (ref 2–50)
ANION GAP SERPL CALC-SCNC: 11 MMOL/L (ref 7–16)
APPEARANCE UR: CLEAR
AST SERPL-CCNC: 34 U/L (ref 12–45)
BACTERIA #/AREA URNS HPF: ABNORMAL /HPF
BASOPHILS # BLD AUTO: 1 % (ref 0–1.8)
BASOPHILS # BLD: 0.04 K/UL (ref 0–0.12)
BILIRUB SERPL-MCNC: 0.4 MG/DL (ref 0.1–1.5)
BILIRUB UR QL STRIP.AUTO: NEGATIVE
BUN SERPL-MCNC: 11 MG/DL (ref 8–22)
CALCIUM ALBUM COR SERPL-MCNC: 9.1 MG/DL (ref 8.5–10.5)
CALCIUM SERPL-MCNC: 9.2 MG/DL (ref 8.5–10.5)
CASTS URNS QL MICRO: ABNORMAL /LPF (ref 0–2)
CHLORIDE SERPL-SCNC: 102 MMOL/L (ref 96–112)
CHOLEST SERPL-MCNC: 178 MG/DL (ref 100–199)
CO2 SERPL-SCNC: 24 MMOL/L (ref 20–33)
COLOR UR: YELLOW
CREAT SERPL-MCNC: 0.83 MG/DL (ref 0.5–1.4)
EOSINOPHIL # BLD AUTO: 0.12 K/UL (ref 0–0.51)
EOSINOPHIL NFR BLD: 2.9 % (ref 0–6.9)
EPITHELIAL CELLS 1715: ABNORMAL /HPF (ref 0–5)
ERYTHROCYTE [DISTWIDTH] IN BLOOD BY AUTOMATED COUNT: 41.7 FL (ref 35.9–50)
FASTING STATUS PATIENT QL REPORTED: NORMAL
FERRITIN SERPL-MCNC: 210 NG/ML (ref 10–291)
GFR SERPLBLD CREATININE-BSD FMLA CKD-EPI: 76 ML/MIN/1.73 M 2
GLOBULIN SER CALC-MCNC: 2.6 G/DL (ref 1.9–3.5)
GLUCOSE SERPL-MCNC: 82 MG/DL (ref 65–99)
GLUCOSE UR STRIP.AUTO-MCNC: NEGATIVE MG/DL
HCT VFR BLD AUTO: 36.9 % (ref 37–47)
HDLC SERPL-MCNC: 42 MG/DL
HGB BLD-MCNC: 12.7 G/DL (ref 12–16)
IMM GRANULOCYTES # BLD AUTO: 0 K/UL (ref 0–0.11)
IMM GRANULOCYTES NFR BLD AUTO: 0 % (ref 0–0.9)
IRON SERPL-MCNC: 74 UG/DL (ref 40–170)
KETONES UR STRIP.AUTO-MCNC: NEGATIVE MG/DL
LDLC SERPL CALC-MCNC: 101 MG/DL
LEUKOCYTE ESTERASE UR QL STRIP.AUTO: NEGATIVE
LYMPHOCYTES # BLD AUTO: 1.59 K/UL (ref 1–4.8)
LYMPHOCYTES NFR BLD: 38.4 % (ref 22–41)
MAGNESIUM SERPL-MCNC: 2.1 MG/DL (ref 1.5–2.5)
MCH RBC QN AUTO: 31.1 PG (ref 27–33)
MCHC RBC AUTO-ENTMCNC: 34.4 G/DL (ref 32.2–35.5)
MCV RBC AUTO: 90.4 FL (ref 81.4–97.8)
MICRO URNS: ABNORMAL
MONOCYTES # BLD AUTO: 0.38 K/UL (ref 0–0.85)
MONOCYTES NFR BLD AUTO: 9.2 % (ref 0–13.4)
NEUTROPHILS # BLD AUTO: 2.01 K/UL (ref 1.82–7.42)
NEUTROPHILS NFR BLD: 48.5 % (ref 44–72)
NITRITE UR QL STRIP.AUTO: NEGATIVE
NRBC # BLD AUTO: 0 K/UL
NRBC BLD-RTO: 0 /100 WBC (ref 0–0.2)
PH UR STRIP.AUTO: 8 [PH] (ref 5–8)
PHOSPHATE SERPL-MCNC: 3.2 MG/DL (ref 2.5–4.5)
PLATELET # BLD AUTO: 253 K/UL (ref 164–446)
PMV BLD AUTO: 9.5 FL (ref 9–12.9)
POTASSIUM SERPL-SCNC: 4 MMOL/L (ref 3.6–5.5)
PROT SERPL-MCNC: 6.7 G/DL (ref 6–8.2)
PROT UR QL STRIP: NEGATIVE MG/DL
RBC # BLD AUTO: 4.08 M/UL (ref 4.2–5.4)
RBC # URNS HPF: ABNORMAL /HPF (ref 0–2)
RBC UR QL AUTO: ABNORMAL
SODIUM SERPL-SCNC: 137 MMOL/L (ref 135–145)
SP GR UR STRIP.AUTO: 1.01
TRIGL SERPL-MCNC: 174 MG/DL (ref 0–149)
TSH SERPL-ACNC: 1.7 UIU/ML (ref 0.35–5.5)
URATE SERPL-MCNC: 3 MG/DL (ref 1.9–8.2)
UROBILINOGEN UR STRIP.AUTO-MCNC: 0.2 EU/DL
WBC # BLD AUTO: 4.1 K/UL (ref 4.8–10.8)
WBC #/AREA URNS HPF: ABNORMAL /HPF

## 2025-02-26 PROCEDURE — 36415 COLL VENOUS BLD VENIPUNCTURE: CPT

## 2025-02-26 PROCEDURE — 80061 LIPID PANEL: CPT

## 2025-02-26 PROCEDURE — 82728 ASSAY OF FERRITIN: CPT

## 2025-02-26 PROCEDURE — 84100 ASSAY OF PHOSPHORUS: CPT

## 2025-02-26 PROCEDURE — 84443 ASSAY THYROID STIM HORMONE: CPT

## 2025-02-26 PROCEDURE — 85025 COMPLETE CBC W/AUTO DIFF WBC: CPT

## 2025-02-26 PROCEDURE — 83540 ASSAY OF IRON: CPT

## 2025-02-26 PROCEDURE — 84550 ASSAY OF BLOOD/URIC ACID: CPT

## 2025-02-26 PROCEDURE — 80053 COMPREHEN METABOLIC PANEL: CPT

## 2025-02-26 PROCEDURE — 81001 URINALYSIS AUTO W/SCOPE: CPT

## 2025-02-26 PROCEDURE — 83735 ASSAY OF MAGNESIUM: CPT

## 2025-03-05 ENCOUNTER — HOSPITAL ENCOUNTER (OUTPATIENT)
Dept: LAB | Facility: MEDICAL CENTER | Age: 70
End: 2025-03-05
Attending: INTERNAL MEDICINE
Payer: MEDICARE

## 2025-03-05 LAB
ALBUMIN SERPL BCP-MCNC: 4.4 G/DL (ref 3.2–4.9)
ALBUMIN/GLOB SERPL: 1.6 G/DL
ALP SERPL-CCNC: 72 U/L (ref 30–99)
ALT SERPL-CCNC: 23 U/L (ref 2–50)
ANION GAP SERPL CALC-SCNC: 12 MMOL/L (ref 7–16)
APPEARANCE UR: CLEAR
AST SERPL-CCNC: 20 U/L (ref 12–45)
BASOPHILS # BLD AUTO: 1.2 % (ref 0–1.8)
BASOPHILS # BLD: 0.07 K/UL (ref 0–0.12)
BILIRUB SERPL-MCNC: 0.3 MG/DL (ref 0.1–1.5)
BILIRUB UR QL STRIP.AUTO: NEGATIVE
BUN SERPL-MCNC: 13 MG/DL (ref 8–22)
CALCIUM ALBUM COR SERPL-MCNC: 9.1 MG/DL (ref 8.5–10.5)
CALCIUM SERPL-MCNC: 9.4 MG/DL (ref 8.5–10.5)
CHLORIDE SERPL-SCNC: 102 MMOL/L (ref 96–112)
CHOLEST SERPL-MCNC: 198 MG/DL (ref 100–199)
CO2 SERPL-SCNC: 26 MMOL/L (ref 20–33)
COLOR UR: YELLOW
CREAT SERPL-MCNC: 0.96 MG/DL (ref 0.5–1.4)
EOSINOPHIL # BLD AUTO: 0.11 K/UL (ref 0–0.51)
EOSINOPHIL NFR BLD: 1.9 % (ref 0–6.9)
ERYTHROCYTE [DISTWIDTH] IN BLOOD BY AUTOMATED COUNT: 43.1 FL (ref 35.9–50)
FASTING STATUS PATIENT QL REPORTED: NORMAL
FERRITIN SERPL-MCNC: 237 NG/ML (ref 10–291)
GFR SERPLBLD CREATININE-BSD FMLA CKD-EPI: 64 ML/MIN/1.73 M 2
GLOBULIN SER CALC-MCNC: 2.7 G/DL (ref 1.9–3.5)
GLUCOSE SERPL-MCNC: 85 MG/DL (ref 65–99)
GLUCOSE UR STRIP.AUTO-MCNC: NEGATIVE MG/DL
HCT VFR BLD AUTO: 40.3 % (ref 37–47)
HDLC SERPL-MCNC: 41 MG/DL
HGB BLD-MCNC: 13 G/DL (ref 12–16)
IMM GRANULOCYTES # BLD AUTO: 0.03 K/UL (ref 0–0.11)
IMM GRANULOCYTES NFR BLD AUTO: 0.5 % (ref 0–0.9)
IRON SERPL-MCNC: 107 UG/DL (ref 40–170)
KETONES UR STRIP.AUTO-MCNC: NEGATIVE MG/DL
LDLC SERPL CALC-MCNC: 108 MG/DL
LEUKOCYTE ESTERASE UR QL STRIP.AUTO: NEGATIVE
LYMPHOCYTES # BLD AUTO: 2.66 K/UL (ref 1–4.8)
LYMPHOCYTES NFR BLD: 45.3 % (ref 22–41)
MAGNESIUM SERPL-MCNC: 2.3 MG/DL (ref 1.5–2.5)
MCH RBC QN AUTO: 30.1 PG (ref 27–33)
MCHC RBC AUTO-ENTMCNC: 32.3 G/DL (ref 32.2–35.5)
MCV RBC AUTO: 93.3 FL (ref 81.4–97.8)
MICRO URNS: NORMAL
MONOCYTES # BLD AUTO: 0.4 K/UL (ref 0–0.85)
MONOCYTES NFR BLD AUTO: 6.8 % (ref 0–13.4)
NEUTROPHILS # BLD AUTO: 2.6 K/UL (ref 1.82–7.42)
NEUTROPHILS NFR BLD: 44.3 % (ref 44–72)
NITRITE UR QL STRIP.AUTO: NEGATIVE
NRBC # BLD AUTO: 0 K/UL
NRBC BLD-RTO: 0 /100 WBC (ref 0–0.2)
PH UR STRIP.AUTO: 8 [PH] (ref 5–8)
PHOSPHATE SERPL-MCNC: 3.8 MG/DL (ref 2.5–4.5)
PLATELET # BLD AUTO: 414 K/UL (ref 164–446)
PMV BLD AUTO: 9.3 FL (ref 9–12.9)
POTASSIUM SERPL-SCNC: 4.8 MMOL/L (ref 3.6–5.5)
PROT SERPL-MCNC: 7.1 G/DL (ref 6–8.2)
PROT UR QL STRIP: NEGATIVE MG/DL
RBC # BLD AUTO: 4.32 M/UL (ref 4.2–5.4)
RBC UR QL AUTO: NEGATIVE
SODIUM SERPL-SCNC: 140 MMOL/L (ref 135–145)
SP GR UR STRIP.AUTO: 1.02
TRIGL SERPL-MCNC: 246 MG/DL (ref 0–149)
TSH SERPL-ACNC: 2.63 UIU/ML (ref 0.35–5.5)
URATE SERPL-MCNC: 3.7 MG/DL (ref 1.9–8.2)
UROBILINOGEN UR STRIP.AUTO-MCNC: 0.2 EU/DL
WBC # BLD AUTO: 5.9 K/UL (ref 4.8–10.8)

## 2025-03-05 PROCEDURE — 82728 ASSAY OF FERRITIN: CPT

## 2025-03-05 PROCEDURE — 84550 ASSAY OF BLOOD/URIC ACID: CPT

## 2025-03-05 PROCEDURE — 83735 ASSAY OF MAGNESIUM: CPT

## 2025-03-05 PROCEDURE — 80053 COMPREHEN METABOLIC PANEL: CPT

## 2025-03-05 PROCEDURE — 84100 ASSAY OF PHOSPHORUS: CPT

## 2025-03-05 PROCEDURE — 81003 URINALYSIS AUTO W/O SCOPE: CPT

## 2025-03-05 PROCEDURE — 36415 COLL VENOUS BLD VENIPUNCTURE: CPT

## 2025-03-05 PROCEDURE — 83540 ASSAY OF IRON: CPT

## 2025-03-05 PROCEDURE — 85025 COMPLETE CBC W/AUTO DIFF WBC: CPT

## 2025-03-05 PROCEDURE — 80061 LIPID PANEL: CPT

## 2025-03-05 PROCEDURE — 84443 ASSAY THYROID STIM HORMONE: CPT

## 2025-03-12 ENCOUNTER — HOSPITAL ENCOUNTER (OUTPATIENT)
Dept: LAB | Facility: MEDICAL CENTER | Age: 70
End: 2025-03-12
Attending: INTERNAL MEDICINE
Payer: MEDICARE

## 2025-03-12 LAB
ALBUMIN SERPL BCP-MCNC: 3.9 G/DL (ref 3.2–4.9)
ALBUMIN/GLOB SERPL: 1.5 G/DL
ALP SERPL-CCNC: 60 U/L (ref 30–99)
ALT SERPL-CCNC: 23 U/L (ref 2–50)
ANION GAP SERPL CALC-SCNC: 11 MMOL/L (ref 7–16)
APPEARANCE UR: CLEAR
AST SERPL-CCNC: 20 U/L (ref 12–45)
BACTERIA #/AREA URNS HPF: ABNORMAL /HPF
BASOPHILS # BLD AUTO: 1.4 % (ref 0–1.8)
BASOPHILS # BLD: 0.06 K/UL (ref 0–0.12)
BILIRUB SERPL-MCNC: 0.2 MG/DL (ref 0.1–1.5)
BILIRUB UR QL STRIP.AUTO: NEGATIVE
BUN SERPL-MCNC: 11 MG/DL (ref 8–22)
CALCIUM ALBUM COR SERPL-MCNC: 9.3 MG/DL (ref 8.5–10.5)
CALCIUM SERPL-MCNC: 9.2 MG/DL (ref 8.5–10.5)
CASTS URNS QL MICRO: ABNORMAL /LPF (ref 0–2)
CHLORIDE SERPL-SCNC: 106 MMOL/L (ref 96–112)
CHOLEST SERPL-MCNC: 183 MG/DL (ref 100–199)
CO2 SERPL-SCNC: 21 MMOL/L (ref 20–33)
COLOR UR: YELLOW
CREAT SERPL-MCNC: 0.87 MG/DL (ref 0.5–1.4)
EOSINOPHIL # BLD AUTO: 0.1 K/UL (ref 0–0.51)
EOSINOPHIL NFR BLD: 2.4 % (ref 0–6.9)
EPITHELIAL CELLS 1715: ABNORMAL /HPF (ref 0–5)
ERYTHROCYTE [DISTWIDTH] IN BLOOD BY AUTOMATED COUNT: 41.6 FL (ref 35.9–50)
FASTING STATUS PATIENT QL REPORTED: NORMAL
FERRITIN SERPL-MCNC: 264 NG/ML (ref 10–291)
GFR SERPLBLD CREATININE-BSD FMLA CKD-EPI: 72 ML/MIN/1.73 M 2
GLOBULIN SER CALC-MCNC: 2.6 G/DL (ref 1.9–3.5)
GLUCOSE SERPL-MCNC: 90 MG/DL (ref 65–99)
GLUCOSE UR STRIP.AUTO-MCNC: NEGATIVE MG/DL
HCT VFR BLD AUTO: 36 % (ref 37–47)
HDLC SERPL-MCNC: 44 MG/DL
HGB BLD-MCNC: 11.8 G/DL (ref 12–16)
HYALINE CAST   1831: PRESENT /LPF
IMM GRANULOCYTES # BLD AUTO: 0.01 K/UL (ref 0–0.11)
IMM GRANULOCYTES NFR BLD AUTO: 0.2 % (ref 0–0.9)
IRON SERPL-MCNC: 42 UG/DL (ref 40–170)
KETONES UR STRIP.AUTO-MCNC: NEGATIVE MG/DL
LDLC SERPL CALC-MCNC: 109 MG/DL
LEUKOCYTE ESTERASE UR QL STRIP.AUTO: NEGATIVE
LYMPHOCYTES # BLD AUTO: 1.74 K/UL (ref 1–4.8)
LYMPHOCYTES NFR BLD: 41.6 % (ref 22–41)
MAGNESIUM SERPL-MCNC: 2 MG/DL (ref 1.5–2.5)
MCH RBC QN AUTO: 29.8 PG (ref 27–33)
MCHC RBC AUTO-ENTMCNC: 32.8 G/DL (ref 32.2–35.5)
MCV RBC AUTO: 90.9 FL (ref 81.4–97.8)
MICRO URNS: ABNORMAL
MONOCYTES # BLD AUTO: 0.38 K/UL (ref 0–0.85)
MONOCYTES NFR BLD AUTO: 9.1 % (ref 0–13.4)
NEUTROPHILS # BLD AUTO: 1.89 K/UL (ref 1.82–7.42)
NEUTROPHILS NFR BLD: 45.3 % (ref 44–72)
NITRITE UR QL STRIP.AUTO: NEGATIVE
NRBC # BLD AUTO: 0 K/UL
NRBC BLD-RTO: 0 /100 WBC (ref 0–0.2)
PH UR STRIP.AUTO: 6 [PH] (ref 5–8)
PHOSPHATE SERPL-MCNC: 3.6 MG/DL (ref 2.5–4.5)
PLATELET # BLD AUTO: 325 K/UL (ref 164–446)
PMV BLD AUTO: 9.4 FL (ref 9–12.9)
POTASSIUM SERPL-SCNC: 4.1 MMOL/L (ref 3.6–5.5)
PROT SERPL-MCNC: 6.5 G/DL (ref 6–8.2)
PROT UR QL STRIP: NEGATIVE MG/DL
RBC # BLD AUTO: 3.96 M/UL (ref 4.2–5.4)
RBC # URNS HPF: ABNORMAL /HPF (ref 0–2)
RBC UR QL AUTO: ABNORMAL
SODIUM SERPL-SCNC: 138 MMOL/L (ref 135–145)
SP GR UR STRIP.AUTO: 1.01
TRIGL SERPL-MCNC: 152 MG/DL (ref 0–149)
TSH SERPL-ACNC: 3.56 UIU/ML (ref 0.35–5.5)
URATE SERPL-MCNC: 3.4 MG/DL (ref 1.9–8.2)
UROBILINOGEN UR STRIP.AUTO-MCNC: 0.2 EU/DL
WBC # BLD AUTO: 4.2 K/UL (ref 4.8–10.8)
WBC #/AREA URNS HPF: ABNORMAL /HPF

## 2025-03-12 PROCEDURE — 36415 COLL VENOUS BLD VENIPUNCTURE: CPT

## 2025-03-12 PROCEDURE — 85025 COMPLETE CBC W/AUTO DIFF WBC: CPT

## 2025-03-12 PROCEDURE — 80061 LIPID PANEL: CPT

## 2025-03-12 PROCEDURE — 82728 ASSAY OF FERRITIN: CPT

## 2025-03-12 PROCEDURE — 81001 URINALYSIS AUTO W/SCOPE: CPT

## 2025-03-12 PROCEDURE — 84443 ASSAY THYROID STIM HORMONE: CPT

## 2025-03-12 PROCEDURE — 80053 COMPREHEN METABOLIC PANEL: CPT

## 2025-03-12 PROCEDURE — 84100 ASSAY OF PHOSPHORUS: CPT

## 2025-03-12 PROCEDURE — 83735 ASSAY OF MAGNESIUM: CPT

## 2025-03-12 PROCEDURE — 84550 ASSAY OF BLOOD/URIC ACID: CPT

## 2025-03-12 PROCEDURE — 83540 ASSAY OF IRON: CPT

## 2025-03-19 ENCOUNTER — OFFICE VISIT (OUTPATIENT)
Dept: MEDICAL GROUP | Facility: PHYSICIAN GROUP | Age: 70
End: 2025-03-19
Payer: MEDICARE

## 2025-03-19 VITALS
TEMPERATURE: 98.8 F | OXYGEN SATURATION: 95 % | DIASTOLIC BLOOD PRESSURE: 80 MMHG | HEART RATE: 80 BPM | SYSTOLIC BLOOD PRESSURE: 108 MMHG | BODY MASS INDEX: 24.62 KG/M2 | HEIGHT: 65 IN | WEIGHT: 147.8 LBS

## 2025-03-19 DIAGNOSIS — Z86.19 HISTORY OF LYME DISEASE: ICD-10-CM

## 2025-03-19 DIAGNOSIS — Z91.81 RISK FOR FALLS: ICD-10-CM

## 2025-03-19 DIAGNOSIS — R59.0 LYMPHADENOPATHY, AXILLARY: ICD-10-CM

## 2025-03-19 DIAGNOSIS — D64.9 ANEMIA, UNSPECIFIED TYPE: ICD-10-CM

## 2025-03-19 DIAGNOSIS — J98.8 RESPIRATORY INFECTION: ICD-10-CM

## 2025-03-19 PROCEDURE — 3074F SYST BP LT 130 MM HG: CPT

## 2025-03-19 PROCEDURE — 99214 OFFICE O/P EST MOD 30 MIN: CPT

## 2025-03-19 PROCEDURE — 3079F DIAST BP 80-89 MM HG: CPT

## 2025-03-19 ASSESSMENT — ENCOUNTER SYMPTOMS
ABDOMINAL PAIN: 0
CHILLS: 0
HEADACHES: 0
WEIGHT LOSS: 0
DIARRHEA: 0
NAUSEA: 0
SHORTNESS OF BREATH: 1
FEVER: 1
VOMITING: 0
SPUTUM PRODUCTION: 1
CONSTIPATION: 0
DIZZINESS: 0
MYALGIAS: 0
BLURRED VISION: 0
COUGH: 1
WEAKNESS: 0

## 2025-03-19 ASSESSMENT — PATIENT HEALTH QUESTIONNAIRE - PHQ9: CLINICAL INTERPRETATION OF PHQ2 SCORE: 0

## 2025-03-19 ASSESSMENT — FIBROSIS 4 INDEX: FIB4 SCORE: 0.89

## 2025-03-19 NOTE — PROGRESS NOTES
Verbal consent was acquired by the patient to use "[employer] ambient listening note generation during this visit  Subjective:     CC:  Diagnoses of Anemia, unspecified type, Respiratory infection, Risk for falls, History of Lyme disease, and Lymphadenopathy, axillary were pertinent to this visit.    HISTORY OF THE PRESENT ILLNESS: Patient is a 69 y.o. female.   Problem   Risk for Falls   Lymphadenopathy, Axillary   History of Lyme Disease    First diagnosed 10/2023  -Seeing Dr. Durham (Functional Medicine)           History of Present Illness  The patient presents for evaluation of illness for 1.5 months. Symptoms include congestion, extreme fatigue, headache, muscle aches, brain fog, and a cough with clear-like mucus.    She has been experiencing a low-grade fever for the past 1.5 months, with a temperature of 99.4 this morning, which is higher than her usual baseline. She reports no sore throat, nasal congestion, facial pain, sinus pressure, or gastrointestinal symptoms such as abdominal pain, diarrhea, constipation, nausea, or vomiting. She has been isolating herself to prevent further illness. She has had weekly lab tests for the past 3 weeks, which have consistently shown mild anemia. She is currently on a regimen of slow-release iron 45 mg daily. She has a history of chronic iron deficiency anemia, which has been evaluated by both a gastroenterologist and a urologist due to persistent hematuria. Despite these evaluations, the source of her bleeding remains undetermined. She has a history of blood transfusion due to severe anemia. She reports no changes in her breasts or presence of lumps. She has been adhering to a diet rich in iron, including spinach, kale, and red meat. She reports no exposure to sick individuals or attendance at large gatherings. She has been experiencing chest pressure and shortness of breath, but no chest pain. She has been experiencing headaches, predominantly in the temple region. She  has a history of sinus infections but reports that her current symptoms are different. She has been experiencing muscle fatigue, which she attributes to both her Lyme disease and her persistent cough. She recalls a brief period of symptom improvement in mid-February 2024, but her condition has since deteriorated, with worsening cough and congestion. She reports no purulent sputum production. Her lung sounds were clear. She has enlarged lymph nodes in both armpits, with the left being larger than the right.    She is currently under the care of Dr. Durham for Lyme disease, with a treatment duration of 3 weeks. She completed her last treatment session on Friday. Dr. Durham hypothesized that her symptoms may be due to lymphatic congestion, leading to a referral to [facility]. She underwent red light therapy and a lymphatic massage yesterday, which appeared to alleviate her muscle aches. She was diagnosed with Lyme disease in 2023, following a prolonged illness after ivana COVID-19. She was initially diagnosed with long COVID-19, but further evaluation revealed Lyme disease. She does not recall a tick bite. She has undergone chelation therapy in the past. She has contracted COVID-19 twice, with subsequent flare-ups of her Lyme disease, but she has responded well to treatments and vitamin C infusions.    ALLERGIES  The patient is allergic to SULFA DRUGS and IODINE.    MEDICATIONS  Current: iron    ROS:   Review of Systems   Constitutional:  Positive for fever and malaise/fatigue. Negative for chills and weight loss.   Eyes:  Negative for blurred vision.   Respiratory:  Positive for cough, sputum production and shortness of breath.    Cardiovascular:  Positive for chest pain.   Gastrointestinal:  Negative for abdominal pain, constipation, diarrhea, nausea and vomiting.   Musculoskeletal:  Negative for myalgias.   Neurological:  Negative for dizziness, weakness and headaches.         Objective:  "    Exam: /80 (BP Location: Left arm, Patient Position: Sitting, BP Cuff Size: Adult)   Pulse 80   Temp 37.1 °C (98.8 °F) (Temporal)   Ht 1.651 m (5' 5\")   Wt 67 kg (147 lb 12.8 oz)   SpO2 95%  Body mass index is 24.6 kg/m².    Physical Exam  Constitutional:       General: She is not in acute distress.     Appearance: Normal appearance. She is not ill-appearing or toxic-appearing.   HENT:      Head: Normocephalic.      Nose: Congestion present.   Eyes:      Conjunctiva/sclera: Conjunctivae normal.   Cardiovascular:      Rate and Rhythm: Normal rate and regular rhythm.      Pulses: Normal pulses.      Heart sounds: Normal heart sounds. No murmur heard.  Pulmonary:      Effort: Pulmonary effort is normal. No respiratory distress.      Breath sounds: Normal breath sounds.   Lymphadenopathy:      Cervical: No cervical adenopathy.      Upper Body:      Right upper body: Axillary adenopathy present. No supraclavicular adenopathy.      Left upper body: Axillary adenopathy present. No supraclavicular adenopathy.   Skin:     General: Skin is warm and dry.   Neurological:      General: No focal deficit present.      Mental Status: She is alert and oriented to person, place, and time.   Psychiatric:         Mood and Affect: Mood normal.         Behavior: Behavior normal.           Labs:   Hospital Outpatient Visit on 03/12/2025   Component Date Value    WBC 03/12/2025 4.2 (L)     RBC 03/12/2025 3.96 (L)     Hemoglobin 03/12/2025 11.8 (L)     Hematocrit 03/12/2025 36.0 (L)     MCV 03/12/2025 90.9     MCH 03/12/2025 29.8     MCHC 03/12/2025 32.8     RDW 03/12/2025 41.6     Platelet Count 03/12/2025 325     MPV 03/12/2025 9.4     Neutrophils-Polys 03/12/2025 45.30     Lymphocytes 03/12/2025 41.60 (H)     Monocytes 03/12/2025 9.10     Eosinophils 03/12/2025 2.40     Basophils 03/12/2025 1.40     Immature Granulocytes 03/12/2025 0.20     Nucleated RBC 03/12/2025 0.00     Neutrophils (Absolute) 03/12/2025 1.89     Lymphs " (Absolute) 03/12/2025 1.74     Monos (Absolute) 03/12/2025 0.38     Eos (Absolute) 03/12/2025 0.10     Baso (Absolute) 03/12/2025 0.06     Immature Granulocytes (a* 03/12/2025 0.01     NRBC (Absolute) 03/12/2025 0.00     Sodium 03/12/2025 138     Potassium 03/12/2025 4.1     Chloride 03/12/2025 106     Co2 03/12/2025 21     Anion Gap 03/12/2025 11.0     Glucose 03/12/2025 90     Bun 03/12/2025 11     Creatinine 03/12/2025 0.87     Calcium 03/12/2025 9.2     Correct Calcium 03/12/2025 9.3     AST(SGOT) 03/12/2025 20     ALT(SGPT) 03/12/2025 23     Alkaline Phosphatase 03/12/2025 60     Total Bilirubin 03/12/2025 0.2     Albumin 03/12/2025 3.9     Total Protein 03/12/2025 6.5     Globulin 03/12/2025 2.6     A-G Ratio 03/12/2025 1.5     Cholesterol,Tot 03/12/2025 183     Triglycerides 03/12/2025 152 (H)     HDL 03/12/2025 44     LDL 03/12/2025 109 (H)     Fasting Status 03/12/2025 Fasting     Magnesium 03/12/2025 2.0     Ferritin 03/12/2025 264.0     Iron 03/12/2025 42     Uric Acid 03/12/2025 3.4     Phosphorus 03/12/2025 3.6     TSH 03/12/2025 3.560     Color 03/12/2025 Yellow     Character 03/12/2025 Clear     Specific Gravity 03/12/2025 1.010     Ph 03/12/2025 6.0     Glucose 03/12/2025 Negative     Ketones 03/12/2025 Negative     Protein 03/12/2025 Negative     Bilirubin 03/12/2025 Negative     Urobilinogen, Urine 03/12/2025 0.2     Nitrite 03/12/2025 Negative     Leukocyte Esterase 03/12/2025 Negative     Occult Blood 03/12/2025 Small (A)     Micro Urine Req 03/12/2025 Microscopic     GFR (CKD-EPI) 03/12/2025 72     WBC 03/12/2025 0-2     RBC 03/12/2025 0-2     Bacteria 03/12/2025 Rare (A)     Epithelial Cells 03/12/2025 0-2     Urine Casts 03/12/2025 0-2     Hyaline Cast 03/12/2025 Present    Hospital Outpatient Visit on 03/05/2025   Component Date Value    WBC 03/05/2025 5.9     RBC 03/05/2025 4.32     Hemoglobin 03/05/2025 13.0     Hematocrit 03/05/2025 40.3     MCV 03/05/2025 93.3     MCH 03/05/2025 30.1      MCHC 03/05/2025 32.3     RDW 03/05/2025 43.1     Platelet Count 03/05/2025 414     MPV 03/05/2025 9.3     Neutrophils-Polys 03/05/2025 44.30     Lymphocytes 03/05/2025 45.30 (H)     Monocytes 03/05/2025 6.80     Eosinophils 03/05/2025 1.90     Basophils 03/05/2025 1.20     Immature Granulocytes 03/05/2025 0.50     Nucleated RBC 03/05/2025 0.00     Neutrophils (Absolute) 03/05/2025 2.60     Lymphs (Absolute) 03/05/2025 2.66     Monos (Absolute) 03/05/2025 0.40     Eos (Absolute) 03/05/2025 0.11     Baso (Absolute) 03/05/2025 0.07     Immature Granulocytes (a* 03/05/2025 0.03     NRBC (Absolute) 03/05/2025 0.00     Sodium 03/05/2025 140     Potassium 03/05/2025 4.8     Chloride 03/05/2025 102     Co2 03/05/2025 26     Anion Gap 03/05/2025 12.0     Glucose 03/05/2025 85     Bun 03/05/2025 13     Creatinine 03/05/2025 0.96     Calcium 03/05/2025 9.4     Correct Calcium 03/05/2025 9.1     AST(SGOT) 03/05/2025 20     ALT(SGPT) 03/05/2025 23     Alkaline Phosphatase 03/05/2025 72     Total Bilirubin 03/05/2025 0.3     Albumin 03/05/2025 4.4     Total Protein 03/05/2025 7.1     Globulin 03/05/2025 2.7     A-G Ratio 03/05/2025 1.6     Cholesterol,Tot 03/05/2025 198     Triglycerides 03/05/2025 246 (H)     HDL 03/05/2025 41     LDL 03/05/2025 108 (H)     Fasting Status 03/05/2025 Fasting     Magnesium 03/05/2025 2.3     Iron 03/05/2025 107     Ferritin 03/05/2025 237.0     Phosphorus 03/05/2025 3.8     TSH 03/05/2025 2.630     Uric Acid 03/05/2025 3.7     Color 03/05/2025 Yellow     Character 03/05/2025 Clear     Specific Gravity 03/05/2025 1.016     Ph 03/05/2025 8.0     Glucose 03/05/2025 Negative     Ketones 03/05/2025 Negative     Protein 03/05/2025 Negative     Bilirubin 03/05/2025 Negative     Urobilinogen, Urine 03/05/2025 0.2     Nitrite 03/05/2025 Negative     Leukocyte Esterase 03/05/2025 Negative     Occult Blood 03/05/2025 Negative     Micro Urine Req 03/05/2025 see below     GFR (CKD-EPI) 03/05/2025 64     Hospital Outpatient Visit on 02/26/2025   Component Date Value    WBC 02/26/2025 4.1 (L)     RBC 02/26/2025 4.08 (L)     Hemoglobin 02/26/2025 12.7     Hematocrit 02/26/2025 36.9 (L)     MCV 02/26/2025 90.4     MCH 02/26/2025 31.1     MCHC 02/26/2025 34.4     RDW 02/26/2025 41.7     Platelet Count 02/26/2025 253     MPV 02/26/2025 9.5     Neutrophils-Polys 02/26/2025 48.50     Lymphocytes 02/26/2025 38.40     Monocytes 02/26/2025 9.20     Eosinophils 02/26/2025 2.90     Basophils 02/26/2025 1.00     Immature Granulocytes 02/26/2025 0.00     Nucleated RBC 02/26/2025 0.00     Neutrophils (Absolute) 02/26/2025 2.01     Lymphs (Absolute) 02/26/2025 1.59     Monos (Absolute) 02/26/2025 0.38     Eos (Absolute) 02/26/2025 0.12     Baso (Absolute) 02/26/2025 0.04     Immature Granulocytes (a* 02/26/2025 0.00     NRBC (Absolute) 02/26/2025 0.00     Sodium 02/26/2025 137     Potassium 02/26/2025 4.0     Chloride 02/26/2025 102     Co2 02/26/2025 24     Anion Gap 02/26/2025 11.0     Glucose 02/26/2025 82     Bun 02/26/2025 11     Creatinine 02/26/2025 0.83     Calcium 02/26/2025 9.2     Correct Calcium 02/26/2025 9.1     AST(SGOT) 02/26/2025 34     ALT(SGPT) 02/26/2025 37     Alkaline Phosphatase 02/26/2025 70     Total Bilirubin 02/26/2025 0.4     Albumin 02/26/2025 4.1     Total Protein 02/26/2025 6.7     Globulin 02/26/2025 2.6     A-G Ratio 02/26/2025 1.6     Cholesterol,Tot 02/26/2025 178     Triglycerides 02/26/2025 174 (H)     HDL 02/26/2025 42     LDL 02/26/2025 101 (H)     Fasting Status 02/26/2025 Fasting     Magnesium 02/26/2025 2.1     Iron 02/26/2025 74     Ferritin 02/26/2025 210.0     Phosphorus 02/26/2025 3.2     TSH 02/26/2025 1.700     Uric Acid 02/26/2025 3.0     Color 02/26/2025 Yellow     Character 02/26/2025 Clear     Specific Gravity 02/26/2025 1.014     Ph 02/26/2025 8.0     Glucose 02/26/2025 Negative     Ketones 02/26/2025 Negative     Protein 02/26/2025 Negative     Bilirubin 02/26/2025 Negative      Urobilinogen, Urine 02/26/2025 0.2     Nitrite 02/26/2025 Negative     Leukocyte Esterase 02/26/2025 Negative     Occult Blood 02/26/2025 Small (A)     Micro Urine Req 02/26/2025 Microscopic     WBC 02/26/2025 0-2     RBC 02/26/2025 6-10 (A)     Bacteria 02/26/2025 None Seen     Epithelial Cells 02/26/2025 0-2     Urine Casts 02/26/2025 0-2     GFR (CKD-EPI) 02/26/2025 76          Assessment & Plan: Medical Decision Making   69 y.o. female with the following -    Assessment & Plan  1. Potential low-grade infection.  Her symptoms, including congestion, extreme fatigue, headache, muscle aches, brain fog, and a cough with clear mucus, suggest a possible low-grade infection. The presence of reactive lymph nodes indicates an active immune response. She current [facility] health status, it is plausible that she has contracted an opportunistic infection. Her lymphocyte count is slightly elevated, indicating an ongoing immune response. However, all other lab results are within normal limits, suggesting no immediate cause for concern. A prescription for Augmentin will be provided for use if her respiratory symptoms worsen. A stool test will be ordered to investigate potential internal bleeding. She is advised to continue her iron supplementation and maintain a diet rich in iron. She is also encouraged to rest as needed and seek immediate medical attention at the emergency room if any urgent issues arise.    2. Lyme disease.  She is currently undergoing treatment for Lyme disease with Dr. Durham, which includes weekly labs showing mild anemia. She has completed a 3-week treatment regimen and is experiencing muscle fatigue and other symptoms likely related to the disease and its treatment. She is advised to continue with her current treatment plan and follow up with Dr. Durham as scheduled.    3. Chronic iron deficiency anemia.  She has a history of chronic iron deficiency anemia and is currently taking 45 mg of slow-release  iron daily. Recent labs show mild anemia. She is advised to continue her iron supplementation and maintain a diet rich in iron. A stool test will be ordered to investigate potential internal bleeding. If her hemoglobin levels drop significantly, a transfusion may be considered.    4. Axillary lymph node enlargement.  She has bilateral axillary lymph node enlargement, with the left side being larger than the right. These are likely reactive lymph nodes due to her ongoing infection and treatment. She is advised to monitor for any changes. If the lymph nodes do not improve in a few weeks, an ultrasound and possibly a diagnostic mammogram will be considered.    5. Risk for Falls  Use this checklist to minimize fall risk  Outside your home  _ [place] outside stairs with a mixture of sand and paint for better traction.   _ Keep outdoor walkways clear and well-lit.  _ Clear snow and ice and overgrown plants from entrances and sidewalks.  Inside your home  _ Remove all extraneous clutter in your house.  _ Keep telephone and electrical cords out of pathways.  _ Tack rugs and glue vinyl vonda so they lie flat. Remove or replace rugs or runners that tend to slip, or attach non-slip backing.  _ Ensure that carpets are firmly attached to the stairs.  _ Do not stand on a chair to reach things.   _ Store frequently used objects where you can reach them easily.  Keep a well-lit home  _ Have a lamp or light switch that you can easily reach without getting out of bed.  _ Use night lights in the bedroom, bathroom and hallways.  _ Keep a flashlight handy.  _ Have light switches at both ends of stairs and halls. Install handrails.  _ Turn on the lights when you go into the house at night.  Bathroom tips  _ Add grab bars in shower, tub and toilet areas.  _ Use nonslip adhesive strips or a mat in shower or tub.  _ Consider sitting on a bench or stool in the shower.  _ Consider using an elevated toilet seat.  Use care walking  _ Use  helping devices, such as canes, as directed by your healthcare provider.  _ Wear nonslip, low-heeled shoes or slippers that fit snugly.  _ Avoid walking in stocking feet.  And don’t forget...  _ Review medications with your doctor or pharmacist. Some drugs, including over the counter drugs, can make you drowsy, dizzy and unsteady.  _ Have your hearing and eyesight tested. Inner ear problems can affect balance. Vision problems make it difficult to see potential hazards.  _ Discuss safe amounts of alcohol intake with your physician.  _ Exercise regularly to improve muscle flexibility, strength, and balance.  _ If you feel dizzy or lightheaded, sit down or stay seated until your head clears.   _ Stand up slowly to avoid unsteadiness.        Problem List Items Addressed This Visit       History of Lyme disease    Risk for falls    Relevant Orders    Patient identified as fall risk.  Appropriate orders and counseling given.    Lymphadenopathy, axillary     Other Visit Diagnoses         Anemia, unspecified type        Relevant Orders    OCCULT BLOOD STOOL      Respiratory infection        Relevant Medications    amoxicillin-clavulanate (AUGMENTIN) 875-125 MG Tab            Differential diagnosis, natural history, supportive care, and indications for immediate follow-up discussed.  Shared decision making approach was utilized, and patient is amendable with plan of care.  Patient understands to return to clinic or go to the emergency department if symptoms worsen. All questions and concerns addressed to the best of my knowledge.      Return in about 6 months (around 9/19/2025), or if symptoms worsen or fail to improve.    Please note that this dictation was created using voice recognition software. I have made every reasonable attempt to correct obvious errors, but I expect that there are errors of grammar and possibly content that I did not discover before finalizing the note.

## 2025-03-22 ENCOUNTER — HOSPITAL ENCOUNTER (OUTPATIENT)
Dept: LAB | Facility: MEDICAL CENTER | Age: 70
End: 2025-03-22
Attending: INTERNAL MEDICINE
Payer: MEDICARE

## 2025-03-22 DIAGNOSIS — D64.9 ANEMIA, UNSPECIFIED TYPE: ICD-10-CM

## 2025-03-22 LAB
ALBUMIN SERPL BCP-MCNC: 4.6 G/DL (ref 3.2–4.9)
ALBUMIN/GLOB SERPL: 1.8 G/DL
ALP SERPL-CCNC: 68 U/L (ref 30–99)
ALT SERPL-CCNC: 23 U/L (ref 2–50)
ANION GAP SERPL CALC-SCNC: 12 MMOL/L (ref 7–16)
APPEARANCE UR: CLEAR
AST SERPL-CCNC: 25 U/L (ref 12–45)
BACTERIA #/AREA URNS HPF: NORMAL /HPF
BASOPHILS # BLD AUTO: 1.2 % (ref 0–1.8)
BASOPHILS # BLD: 0.05 K/UL (ref 0–0.12)
BILIRUB SERPL-MCNC: 0.4 MG/DL (ref 0.1–1.5)
BILIRUB UR QL STRIP.AUTO: NEGATIVE
BUN SERPL-MCNC: 9 MG/DL (ref 8–22)
CALCIUM ALBUM COR SERPL-MCNC: 9.2 MG/DL (ref 8.5–10.5)
CALCIUM SERPL-MCNC: 9.7 MG/DL (ref 8.5–10.5)
CASTS URNS QL MICRO: NORMAL /LPF (ref 0–2)
CHLORIDE SERPL-SCNC: 102 MMOL/L (ref 96–112)
CHOLEST SERPL-MCNC: 198 MG/DL (ref 100–199)
CO2 SERPL-SCNC: 25 MMOL/L (ref 20–33)
COLOR UR: YELLOW
CREAT SERPL-MCNC: 0.8 MG/DL (ref 0.5–1.4)
EOSINOPHIL # BLD AUTO: 0.05 K/UL (ref 0–0.51)
EOSINOPHIL NFR BLD: 1.2 % (ref 0–6.9)
EPITHELIAL CELLS 1715: NORMAL /HPF (ref 0–5)
ERYTHROCYTE [DISTWIDTH] IN BLOOD BY AUTOMATED COUNT: 44.5 FL (ref 35.9–50)
FERRITIN SERPL-MCNC: 218 NG/ML (ref 10–291)
GFR SERPLBLD CREATININE-BSD FMLA CKD-EPI: 79 ML/MIN/1.73 M 2
GLOBULIN SER CALC-MCNC: 2.6 G/DL (ref 1.9–3.5)
GLUCOSE SERPL-MCNC: 87 MG/DL (ref 65–99)
GLUCOSE UR STRIP.AUTO-MCNC: NEGATIVE MG/DL
HCT VFR BLD AUTO: 38.8 % (ref 37–47)
HDLC SERPL-MCNC: 53 MG/DL
HGB BLD-MCNC: 12.7 G/DL (ref 12–16)
IMM GRANULOCYTES # BLD AUTO: 0.01 K/UL (ref 0–0.11)
IMM GRANULOCYTES NFR BLD AUTO: 0.2 % (ref 0–0.9)
IRON SERPL-MCNC: 87 UG/DL (ref 40–170)
KETONES UR STRIP.AUTO-MCNC: NEGATIVE MG/DL
LDLC SERPL CALC-MCNC: 116 MG/DL
LEUKOCYTE ESTERASE UR QL STRIP.AUTO: NEGATIVE
LYMPHOCYTES # BLD AUTO: 1.71 K/UL (ref 1–4.8)
LYMPHOCYTES NFR BLD: 40.2 % (ref 22–41)
MAGNESIUM SERPL-MCNC: 2.1 MG/DL (ref 1.5–2.5)
MCH RBC QN AUTO: 30.5 PG (ref 27–33)
MCHC RBC AUTO-ENTMCNC: 32.7 G/DL (ref 32.2–35.5)
MCV RBC AUTO: 93.3 FL (ref 81.4–97.8)
MICRO URNS: ABNORMAL
MONOCYTES # BLD AUTO: 0.34 K/UL (ref 0–0.85)
MONOCYTES NFR BLD AUTO: 8 % (ref 0–13.4)
NEUTROPHILS # BLD AUTO: 2.09 K/UL (ref 1.82–7.42)
NEUTROPHILS NFR BLD: 49.2 % (ref 44–72)
NITRITE UR QL STRIP.AUTO: NEGATIVE
NRBC # BLD AUTO: 0 K/UL
NRBC BLD-RTO: 0 /100 WBC (ref 0–0.2)
PH UR STRIP.AUTO: 7 [PH] (ref 5–8)
PHOSPHATE SERPL-MCNC: 3.6 MG/DL (ref 2.5–4.5)
PLATELET # BLD AUTO: 348 K/UL (ref 164–446)
PMV BLD AUTO: 9.7 FL (ref 9–12.9)
POTASSIUM SERPL-SCNC: 4.2 MMOL/L (ref 3.6–5.5)
PROT SERPL-MCNC: 7.2 G/DL (ref 6–8.2)
PROT UR QL STRIP: NEGATIVE MG/DL
RBC # BLD AUTO: 4.16 M/UL (ref 4.2–5.4)
RBC # URNS HPF: NORMAL /HPF (ref 0–2)
RBC UR QL AUTO: ABNORMAL
SODIUM SERPL-SCNC: 139 MMOL/L (ref 135–145)
SP GR UR STRIP.AUTO: 1.01
TRIGL SERPL-MCNC: 144 MG/DL (ref 0–149)
TSH SERPL-ACNC: 2.08 UIU/ML (ref 0.35–5.5)
URATE SERPL-MCNC: 3.5 MG/DL (ref 1.9–8.2)
UROBILINOGEN UR STRIP.AUTO-MCNC: 0.2 EU/DL
WBC # BLD AUTO: 4.3 K/UL (ref 4.8–10.8)
WBC #/AREA URNS HPF: NORMAL /HPF

## 2025-03-22 PROCEDURE — 80053 COMPREHEN METABOLIC PANEL: CPT

## 2025-03-22 PROCEDURE — 83735 ASSAY OF MAGNESIUM: CPT

## 2025-03-22 PROCEDURE — 80061 LIPID PANEL: CPT

## 2025-03-22 PROCEDURE — 82272 OCCULT BLD FECES 1-3 TESTS: CPT

## 2025-03-22 PROCEDURE — 36415 COLL VENOUS BLD VENIPUNCTURE: CPT

## 2025-03-22 PROCEDURE — 81001 URINALYSIS AUTO W/SCOPE: CPT

## 2025-03-22 PROCEDURE — 84550 ASSAY OF BLOOD/URIC ACID: CPT

## 2025-03-22 PROCEDURE — 84100 ASSAY OF PHOSPHORUS: CPT

## 2025-03-22 PROCEDURE — 82728 ASSAY OF FERRITIN: CPT

## 2025-03-22 PROCEDURE — 85025 COMPLETE CBC W/AUTO DIFF WBC: CPT

## 2025-03-22 PROCEDURE — 83540 ASSAY OF IRON: CPT

## 2025-03-22 PROCEDURE — 84443 ASSAY THYROID STIM HORMONE: CPT

## 2025-03-24 ENCOUNTER — RESULTS FOLLOW-UP (OUTPATIENT)
Dept: MEDICAL GROUP | Facility: PHYSICIAN GROUP | Age: 70
End: 2025-03-24

## 2025-03-24 LAB — HEMOCCULT STL QL: NEGATIVE

## 2025-03-25 ENCOUNTER — HOSPITAL ENCOUNTER (OUTPATIENT)
Dept: RADIOLOGY | Facility: MEDICAL CENTER | Age: 70
End: 2025-03-25
Payer: MEDICARE

## 2025-03-25 DIAGNOSIS — R05.2 SUBACUTE COUGH: ICD-10-CM

## 2025-03-25 DIAGNOSIS — M54.6 ACUTE LEFT-SIDED THORACIC BACK PAIN: ICD-10-CM

## 2025-03-25 DIAGNOSIS — R07.89 OTHER CHEST PAIN: ICD-10-CM

## 2025-03-25 PROCEDURE — 72072 X-RAY EXAM THORAC SPINE 3VWS: CPT

## 2025-03-25 PROCEDURE — 71046 X-RAY EXAM CHEST 2 VIEWS: CPT

## 2025-03-26 ENCOUNTER — RESULTS FOLLOW-UP (OUTPATIENT)
Dept: MEDICAL GROUP | Facility: PHYSICIAN GROUP | Age: 70
End: 2025-03-26

## 2025-03-26 DIAGNOSIS — R07.9 CHEST PAIN IN ADULT: ICD-10-CM

## 2025-03-26 DIAGNOSIS — R53.82 CHRONIC FATIGUE: ICD-10-CM

## 2025-03-26 DIAGNOSIS — I10 PRIMARY HYPERTENSION: ICD-10-CM

## 2025-03-28 NOTE — Clinical Note
[facility]  [id number] [facility] [place] [address]    [id number]    Deya Schumacher  [address]  [place] [address]    March 28, 2025    Member Name: Radha Schumacher   Member Number: [id number]   Reference Number: [id number]   Approved Services: Echos and EKG   Approved Service Dates: 03/26/2025 - 07/26/2025   Requesting Provider: Eyal Salgado   Requested Provider: [facility]     Dear Radha Analymushtaq Schumacher:    The following medical service(s) requested by Eyal Salgado have been approved:    Procedure Code Procedure Code Name Requested Quantity Approved Quantity Status   84337 (CPT®) OK ECHO HEART XTHORACIC,COMPLETE W DOPPLER 1 1 Authorized       Approved Quantity means the number of visits approved for medication treatments and/or medical services.    The services should be provided by [facility] no later than 07/26/2025. Please contact the provider listed below with any questions.     Provider Information:  [facility]  [phone]    Your plan benefit may require a deductible, co-payment or coinsurance for these services. This authorization does not guarantee [facility] will pay the claim for services that you receive. Payment by [facility] for these services is subject to the terms of your Evidence of Coverage, your eligibility at the time of service, and confirmation of benefit coverage.    For any questions or additional information, please contact Customer Service:    [facility] Toll Free: [phone]  TTY users dial: [phone]   Call Center Hours:  Oct 1 - Mar 31, Mon - Fri 7 AM to 8 PM PST  Oct 1 - Mar 31, Sat - Sun 8 AM to 8 PM PST  Apr 1 - Sep 30, Mon - Fri 7 AM to 8 PM PST   Office Hours: Mon - Fri 8 AM to 5 PM PST   E-mail: [email or web address]   Website:  [email or web address]      This information is available for free in other languages. Please contact Customer Service  at the phone number above for more information. [facility] complies with applicable Federal civil rights laws and does not discriminate on the basis of race, color, national origin, age, disability or sex.    Sincerely,     Healthcare Utilization Management Department     Cc: [facility]   Eyal Salgado    Multi-Language Insert  Multi- Services  English: We have free  services to answer any questions you may have about our health or drug plan.  To get an , just call us at [phone].  Someone who speaks English/Language can help you.  This is a free service.  Turkish: Tenemos servicios de intérprete sin costo alguno  para responder cualquier pregunta que pueda tener sobre nuestro plan de awilda o medicamentos. Para hablar con un intérprete, por favor llame al [phone]. Alguien que hable español le podrá ayudar. Mariah es un servicio gratuito.  Chinese Mandarin: ?????????????????????????????? ???????????????? [phone]????????????????? ?????????  Chinese Cantonese: ?????????????????????????????? ????????????? [phone]???????????????????? ????????  Tagalog:  Cody weber serbisyo sa rodriguezsasalcindi-jovita mckeonumang a gerald araujo hinggil sa jose eduardo borregoong pangfiorlusuangie o panggamot.  pipo Max  [phone]. Maaari kayonlisa Vázquez.  Landon evans.  Mexican:  Nous proposons selena services gratuits d'interprétation pour répondre à toutes ronny questions relatives à notre régime de santé ou d'assurance-médicaments. Pour accéder au service d'interprétation, il vous suffit de nous appeler au [phone]. Un interlocuteur parDivine Savior Healthcaremónica Françs pourra vous aider. Ce service est gratuit.  English:  Leslie larai có d?ch v? thông d?ch mi?n phí ð? tr? l?i các câu h?i v? chýõng s?c kh?e và chýõng trình thu?c men. N?u quí v? c?n thông  d?ch viên missael g?i [phone] s? có nhân viên nói ti?ng Vi?t giúp ð? quí v?. Ðây là d?ch v? mi?n phí .  Setswana:  Unser kostenser Dolmetscherservice beantwortet Ihren Fragen zu unserem Gesundheits- und Arzneimittelplan. Unsere Dolmetscher erreichen Sie [phone]. Man wird Ihnen beverly auf [facility]. Dieser Service ist [facility].  Turkish:  ??? ?? ?? ?? ?? ??? ?? ??? ?? ???? ?? ?? ???? ???? ????. ?? ???? ????? ?? [phone] ??? ??? ????.  ???? ?? ???? ?? ?? ????. ? ???? ??? ?????.   Canadian: Åñëè ó âàñ âîçíèêíóò âîïðîñû îòíîñèòåëüíî ñòðàõîâîãî èëè ìåäèêàìåíòíîãî ïëàíà, âû ìîæåòå âîñïîëüçîâàòüñÿ íàøèìè áåñïëàòíûìè óñëóãàìè ïåðåâîä÷èêîâ. ×òîáû âîñïîëüçîâàòüñÿ óñëóãàìè ïåðåâîä÷èêà, ïîçâîíèòå íàì ïî òåëåôîíó [phone]. Âàì îêàæåò ïîìîùü ñîòðóäíèê, êîòîðûé ãîâîðèò ïî-póññêè. Äàííàÿ óñëóãà áåñïëàòíàÿ.  Maltese: ÅääÇ äÞÏã ÎÏãÇÊ ÇáãÊÑÌã ÇáÝæÑí ÇáãÌÇäíÉ ááÅÌÇÈÉ Úä Ãí ÃÓÆáÉ ÊÊÚáÞ ÈÇáÕÍÉ Ãæ ÌÏæá ÇáÃÏæíÉ áÏíäÇ. ááÍÕæá Úáì ãÊÑÌã ÝæÑí¡ áíÓ Úáíß Óæì ÇáÇÊÕÇá ÈäÇ Úáì [phone] . ÓíÞæã ÔÎÕ ãÇ íÊÍÏË ÇáÚÑÈíÉ ÈãÓÇÚÏÊß. åÐå ÎÏãÉ ãÌÇäíÉ.  Nayeli: ????? ????????? ?? ??? ?? ????? ?? ???? ??? ???? ???? ?? ?????? ?? ???? ???? ?? ??? ????? ??? ????? ???????? ?????? ?????? ???. ?? ???????? ??????? ???? ?? ???, ?? ???? [phone] ?? ??? ????. ??? ??????? ?? ?????? ????? ?? ???? ??? ?? ???? ??. ?? ?? ????? ???? ??.   Bulgarian:  È disponibile un servizio di interpretariato gratuito per rispondere a eventuali domande sul nostro piano sanitario e farmaceutico. Per un interprete, contattare il gianluca [phone]. Un nostro incaricato sohail parla Italianovi fornirà l'assistenza necessaria. È un servizio gratuito.  Portugués:  Dispomos de serviços de interpretação gratuitos para responder a qualquer questão que tenha acerca do nosso plano de saúde ou de medicação. Para obter um intérprete, contacte-nos através do número [phone]. Irá encontrar alguém que fale o idioma  Português para o ajudar. Mariah serviço é  gratuito.  Czech Creole:  Nou genyen sèvis entèprèt gratis addy reponn tout kesyon ou ta genyen konsènan plan medikal oswa dwòg nou an.  Addy jwenn yon entèprèt, jis rele nou nan [phone]. Yon moun ki pale Kreyòl kapab chichi w.  Sa a se yon sèvis ki gratis.  Polish:  Umo¿liwiamy bezp³atne skorzystanie z us³ug t³umacza ustnego, który pomo¿e w uzyskaniu odpowiedzi na temat planu zdrowotnego lub dawkowania annettew. Sammie skorzystaæ z pomocy t³umacza znaj¹cego romaine montgomery¿y zadzwoniæ pod numer [phone]. Ta us³uga jest bezp³atna.  Bhutanese: ????? ??????? ????????????????????? ??????????????????????????????????[phone] ???????????????? ? ????????????????? ?????

## 2025-04-01 DIAGNOSIS — F41.9 ANXIETY: ICD-10-CM

## 2025-04-01 NOTE — TELEPHONE ENCOUNTER
Received request via: Pharmacy    Was the patient seen in the last year in this department? Yes    Does the patient have an active prescription (recently filled or refills available) for medication(s) requested? No    Pharmacy Name: [facility]    Does the patient have shelter Plus and need 100-day supply? (This applies to ALL medications) Yes, quantity updated to 100 days

## 2025-04-09 ENCOUNTER — ANCILLARY PROCEDURE (OUTPATIENT)
Dept: CARDIOLOGY | Facility: MEDICAL CENTER | Age: 70
End: 2025-04-09
Payer: MEDICARE

## 2025-04-09 DIAGNOSIS — I10 PRIMARY HYPERTENSION: ICD-10-CM

## 2025-04-09 DIAGNOSIS — R53.82 CHRONIC FATIGUE: ICD-10-CM

## 2025-04-09 DIAGNOSIS — R07.9 CHEST PAIN IN ADULT: ICD-10-CM

## 2025-04-09 PROCEDURE — 93306 TTE W/DOPPLER COMPLETE: CPT

## 2025-04-10 ENCOUNTER — RESULTS FOLLOW-UP (OUTPATIENT)
Dept: MEDICAL GROUP | Facility: PHYSICIAN GROUP | Age: 70
End: 2025-04-10

## 2025-04-10 DIAGNOSIS — E78.5 DYSLIPIDEMIA: ICD-10-CM

## 2025-04-10 DIAGNOSIS — I51.89 GRADE I DIASTOLIC DYSFUNCTION: ICD-10-CM

## 2025-04-10 DIAGNOSIS — I10 PRIMARY HYPERTENSION: ICD-10-CM

## 2025-04-10 LAB
LV EJECT FRACT MOD 2C 99903: 53.28
LV EJECT FRACT MOD 4C 99902: 55.25
LV EJECT FRACT MOD BP 99901: 54.89

## 2025-04-10 PROCEDURE — 93306 TTE W/DOPPLER COMPLETE: CPT | Mod: 26 | Performed by: STUDENT IN AN ORGANIZED HEALTH CARE EDUCATION/TRAINING PROGRAM

## 2025-04-17 ENCOUNTER — OFFICE VISIT (OUTPATIENT)
Dept: CARDIOLOGY | Facility: MEDICAL CENTER | Age: 70
End: 2025-04-17
Payer: MEDICARE

## 2025-04-17 ENCOUNTER — HOSPITAL ENCOUNTER (OUTPATIENT)
Dept: LAB | Facility: MEDICAL CENTER | Age: 70
End: 2025-04-17
Attending: INTERNAL MEDICINE
Payer: MEDICARE

## 2025-04-17 ENCOUNTER — APPOINTMENT (OUTPATIENT)
Dept: MEDICAL GROUP | Facility: PHYSICIAN GROUP | Age: 70
End: 2025-04-17
Payer: MEDICARE

## 2025-04-17 VITALS
RESPIRATION RATE: 16 BRPM | WEIGHT: 149 LBS | SYSTOLIC BLOOD PRESSURE: 126 MMHG | DIASTOLIC BLOOD PRESSURE: 80 MMHG | BODY MASS INDEX: 24.83 KG/M2 | OXYGEN SATURATION: 97 % | HEART RATE: 80 BPM | HEIGHT: 65 IN

## 2025-04-17 DIAGNOSIS — R06.09 DYSPNEA ON EXERTION: ICD-10-CM

## 2025-04-17 DIAGNOSIS — R07.2 PRECORDIAL CHEST PAIN: ICD-10-CM

## 2025-04-17 DIAGNOSIS — I10 PRIMARY HYPERTENSION: ICD-10-CM

## 2025-04-17 DIAGNOSIS — Z71.89 ENCOUNTER FOR CARDIAC RISK COUNSELING: ICD-10-CM

## 2025-04-17 DIAGNOSIS — E78.1 HYPERTRIGLYCERIDEMIA: Chronic | ICD-10-CM

## 2025-04-17 LAB
ALBUMIN SERPL BCP-MCNC: 4.2 G/DL (ref 3.2–4.9)
ALBUMIN/GLOB SERPL: 1.6 G/DL
ALP SERPL-CCNC: 73 U/L (ref 30–99)
ALT SERPL-CCNC: 16 U/L (ref 2–50)
ANION GAP SERPL CALC-SCNC: 9 MMOL/L (ref 7–16)
APPEARANCE UR: CLEAR
AST SERPL-CCNC: 20 U/L (ref 12–45)
BACTERIA #/AREA URNS HPF: NORMAL /HPF
BASOPHILS # BLD AUTO: 1 % (ref 0–1.8)
BASOPHILS # BLD: 0.05 K/UL (ref 0–0.12)
BILIRUB SERPL-MCNC: 0.3 MG/DL (ref 0.1–1.5)
BILIRUB UR QL STRIP.AUTO: NEGATIVE
BUN SERPL-MCNC: 14 MG/DL (ref 8–22)
CALCIUM ALBUM COR SERPL-MCNC: 9.3 MG/DL (ref 8.5–10.5)
CALCIUM SERPL-MCNC: 9.5 MG/DL (ref 8.5–10.5)
CASTS URNS QL MICRO: NORMAL /LPF (ref 0–2)
CHLORIDE SERPL-SCNC: 103 MMOL/L (ref 96–112)
CHOLEST SERPL-MCNC: 198 MG/DL (ref 100–199)
CO2 SERPL-SCNC: 26 MMOL/L (ref 20–33)
COLOR UR: YELLOW
CREAT SERPL-MCNC: 0.85 MG/DL (ref 0.5–1.4)
EOSINOPHIL # BLD AUTO: 0.09 K/UL (ref 0–0.51)
EOSINOPHIL NFR BLD: 1.8 % (ref 0–6.9)
EPITHELIAL CELLS 1715: NORMAL /HPF (ref 0–5)
ERYTHROCYTE [DISTWIDTH] IN BLOOD BY AUTOMATED COUNT: 42.2 FL (ref 35.9–50)
FERRITIN SERPL-MCNC: 155 NG/ML (ref 10–291)
GFR SERPLBLD CREATININE-BSD FMLA CKD-EPI: 74 ML/MIN/1.73 M 2
GLOBULIN SER CALC-MCNC: 2.7 G/DL (ref 1.9–3.5)
GLUCOSE SERPL-MCNC: 86 MG/DL (ref 65–99)
GLUCOSE UR STRIP.AUTO-MCNC: NEGATIVE MG/DL
HCT VFR BLD AUTO: 36.8 % (ref 37–47)
HDLC SERPL-MCNC: 56 MG/DL
HGB BLD-MCNC: 12.4 G/DL (ref 12–16)
IMM GRANULOCYTES # BLD AUTO: 0.01 K/UL (ref 0–0.11)
IMM GRANULOCYTES NFR BLD AUTO: 0.2 % (ref 0–0.9)
IRON SERPL-MCNC: 88 UG/DL (ref 40–170)
KETONES UR STRIP.AUTO-MCNC: NEGATIVE MG/DL
LDLC SERPL CALC-MCNC: 113 MG/DL
LEUKOCYTE ESTERASE UR QL STRIP.AUTO: NEGATIVE
LYMPHOCYTES # BLD AUTO: 1.96 K/UL (ref 1–4.8)
LYMPHOCYTES NFR BLD: 39.6 % (ref 22–41)
MAGNESIUM SERPL-MCNC: 2 MG/DL (ref 1.5–2.5)
MCH RBC QN AUTO: 30.8 PG (ref 27–33)
MCHC RBC AUTO-ENTMCNC: 33.7 G/DL (ref 32.2–35.5)
MCV RBC AUTO: 91.3 FL (ref 81.4–97.8)
MICRO URNS: ABNORMAL
MONOCYTES # BLD AUTO: 0.37 K/UL (ref 0–0.85)
MONOCYTES NFR BLD AUTO: 7.5 % (ref 0–13.4)
NEUTROPHILS # BLD AUTO: 2.47 K/UL (ref 1.82–7.42)
NEUTROPHILS NFR BLD: 49.9 % (ref 44–72)
NITRITE UR QL STRIP.AUTO: NEGATIVE
NRBC # BLD AUTO: 0 K/UL
NRBC BLD-RTO: 0 /100 WBC (ref 0–0.2)
PH UR STRIP.AUTO: 7 [PH] (ref 5–8)
PHOSPHATE SERPL-MCNC: 3.6 MG/DL (ref 2.5–4.5)
PLATELET # BLD AUTO: 329 K/UL (ref 164–446)
PMV BLD AUTO: 9.4 FL (ref 9–12.9)
POTASSIUM SERPL-SCNC: 4.8 MMOL/L (ref 3.6–5.5)
PROT SERPL-MCNC: 6.9 G/DL (ref 6–8.2)
PROT UR QL STRIP: NEGATIVE MG/DL
RBC # BLD AUTO: 4.03 M/UL (ref 4.2–5.4)
RBC # URNS HPF: NORMAL /HPF (ref 0–2)
RBC UR QL AUTO: ABNORMAL
SODIUM SERPL-SCNC: 138 MMOL/L (ref 135–145)
SP GR UR STRIP.AUTO: 1.01
TRIGL SERPL-MCNC: 146 MG/DL (ref 0–149)
TSH SERPL-ACNC: 2.63 UIU/ML (ref 0.38–5.33)
URATE SERPL-MCNC: 3.7 MG/DL (ref 1.9–8.2)
UROBILINOGEN UR STRIP.AUTO-MCNC: 0.2 EU/DL
WBC # BLD AUTO: 5 K/UL (ref 4.8–10.8)
WBC #/AREA URNS HPF: NORMAL /HPF

## 2025-04-17 PROCEDURE — 3079F DIAST BP 80-89 MM HG: CPT | Performed by: INTERNAL MEDICINE

## 2025-04-17 PROCEDURE — 83735 ASSAY OF MAGNESIUM: CPT

## 2025-04-17 PROCEDURE — 80053 COMPREHEN METABOLIC PANEL: CPT

## 2025-04-17 PROCEDURE — 83540 ASSAY OF IRON: CPT

## 2025-04-17 PROCEDURE — 3074F SYST BP LT 130 MM HG: CPT | Performed by: INTERNAL MEDICINE

## 2025-04-17 PROCEDURE — 99213 OFFICE O/P EST LOW 20 MIN: CPT | Performed by: INTERNAL MEDICINE

## 2025-04-17 PROCEDURE — 84443 ASSAY THYROID STIM HORMONE: CPT

## 2025-04-17 PROCEDURE — 82728 ASSAY OF FERRITIN: CPT

## 2025-04-17 PROCEDURE — 81001 URINALYSIS AUTO W/SCOPE: CPT

## 2025-04-17 PROCEDURE — 99214 OFFICE O/P EST MOD 30 MIN: CPT | Performed by: INTERNAL MEDICINE

## 2025-04-17 PROCEDURE — 36415 COLL VENOUS BLD VENIPUNCTURE: CPT

## 2025-04-17 PROCEDURE — 80061 LIPID PANEL: CPT

## 2025-04-17 PROCEDURE — 84550 ASSAY OF BLOOD/URIC ACID: CPT

## 2025-04-17 PROCEDURE — 84100 ASSAY OF PHOSPHORUS: CPT

## 2025-04-17 PROCEDURE — G0537 PR RISK ASCVD TST ONCE PR 12 MO: HCPCS | Performed by: INTERNAL MEDICINE

## 2025-04-17 PROCEDURE — 85025 COMPLETE CBC W/AUTO DIFF WBC: CPT

## 2025-04-17 RX ORDER — CHLORAL HYDRATE 500 MG
2000 CAPSULE ORAL 2 TIMES DAILY
COMMUNITY
Start: 2025-04-17

## 2025-04-17 ASSESSMENT — FIBROSIS 4 INDEX: FIB4 SCORE: 1.03

## 2025-04-17 NOTE — PROGRESS NOTES
Chief Complaint   Patient presents with    Varicose Veins    Hypertension    Dyslipidemia       Subjective     Deya Analy Schumacher is a 69 y.o. 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    Unspecified urinary incontinence      Past Surgical History:   Procedure Laterality Date    SHOULDER DECOMPRESSION ARTHROSCOPIC Left 2018    Procedure: SHOULDER DECOMPRESSION ARTHROSCOPIC - SUBACROMIAL W/LABRAL DEBRIDEMENT;  Surgeon: Anum Santana M.D.;  Location: SURGERY [facility];  Service: Orthopedics    SHOULDER ARTHROSCOPY W/ ROTATOR CUFF REPAIR  2018    Procedure: SHOULDER ARTHROSCOPY W/ ROTATOR CUFF REPAIR - MARS;  Surgeon: Anum Santana M.D.;  Location: SURGERY [facility];  Service: Orthopedics    UMBILICAL HERNIA REPAIR  3/28/2011    Performed by WILLIAM GOLD at SURGERY [facility]    VEIN STRIPPING      HYSTERECTOMY, TOTAL ABDOMINAL  1985    HI  DELIVERY ONLY  1979    TUBAL LIGATION  1979    HI RENAL SCOPE,BIOPSY      COLONOSCOPY      x 2    ENDOSCOPY       Family History   Problem Relation Age of Onset    Hyperlipidemia Mother     Hypertension Mother     Alzheimer's Disease Mother     Diabetes Father     Lung Disease Father     Cancer Father         bladder and prostate    Thyroid Father     Obesity Sister     Obesity Brother      Social History     Socioeconomic History    Marital status:      Spouse name: Not on file    Number of children: Not on file    Years of education: Not on file    Highest education level: 12th grade   Occupational History    Not on file   Tobacco Use    Smoking status: Never     Smokeless tobacco: Never   Vaping Use    Vaping status: Never Used   Substance and Sexual Activity    Alcohol use: Yes     Comment: 4-5 per week    Drug use: No    Sexual activity: Not on file   Other Topics Concern    Not on file   Social History Narrative    Not on file     Social Drivers of Health     Financial Resource Strain: Low Risk  (11/19/2024)    Overall Financial Resource Strain (CARDIA)     Difficulty of Paying Living Expenses: Not very hard   Food Insecurity: No Food Insecurity (11/19/2024)    Hunger Vital Sign     Worried About Running Out of Food in the Last Year: Never true     Ran Out of Food in the Last Year: Never true   Transportation Needs: No Transportation Needs (11/19/2024)    PRAPARE - Transportation     Lack of Transportation (Medical): No     Lack of Transportation (Non-Medical): No   Physical Activity: Insufficiently Active (11/19/2024)    Exercise Vital Sign     Days of Exercise per Week: 5 days     Minutes of Exercise per Session: 20 min   Stress: Stress Concern Present (11/19/2024)    [employer] - Occupational Stress Questionnaire     Feeling of Stress : To some extent   Social Connections: Socially Integrated (11/19/2024)    Social Connection and Isolation Panel [NHANES]     Frequency of Communication with Friends and Family: More than three times a week     Frequency of Social Gatherings with Friends and Family: More than three times a week     Attends Worship Services: More than 4 times per year     Active Member of Clubs or Organizations: Yes     Attends Club or Organization Meetings: More than 4 times per year     Marital Status:    Intimate Partner Violence: Not on file   Housing Stability: Low Risk  (11/19/2024)    Housing Stability Vital Sign     Unable to Pay for Housing in the Last Year: No     Number of Times Moved in the Last Year: 0     Homeless in the Last Year: No     Allergies   Allergen Reactions    Iodine Anaphylaxis     IVP  "dye-anaphylaxis    Sulfa Drugs Hives and Rash    Other Environmental      Seasonal hayfever-runny eyes/nose     Outpatient Encounter Medications as of 4/17/2025   Medication Sig Dispense Refill    Omega-3 Fatty Acids (FISH OIL) 1000 MG Cap capsule Take 2 Capsules by mouth 2 times a day. EPA preferred      FLUoxetine (PROZAC) 20 MG Cap TAKE 1 CAPSULE BY MOUTH ONCE DAILY . APPOINTMENT REQUIRED FOR FUTURE REFILLS 100 Capsule 2    olmesartan (BENICAR) 5 MG tablet TAKE 1 TABLET BY MOUTH ONCE DAILY AS DIRECTED . APPOINTMENT REQUIRED FOR FUTURE REFILLS 100 Tablet 2    fexofenadine (ALLEGRA ALLERGY) 180 MG tablet 1 Orally qd for 30 day(s)      famotidine (PEPCID) 40 MG Tab Take 40 mg by mouth.      rabeprazole (ACIPHEX) 20 MG tablet Take 20 mg by mouth every day.      diphenhydrAMINE-APAP, sleep,  MG Tab Take 2 Tabs by mouth every bedtime.      Ferrous Fumarate 325 (106 FE) MG TABS Take 1 Tab by mouth every bedtime.      [DISCONTINUED] amoxicillin-clavulanate (AUGMENTIN) 875-125 MG Tab Take 1 Tablet by mouth 2 times a day. 14 Tablet 0    [DISCONTINUED] methylPREDNISolone (MEDROL DOSEPAK) 4 MG Tablet Therapy Pack Follow schedule on package instructions. (Patient not taking: Reported on 3/19/2025) 21 Tablet 0    [DISCONTINUED] Vitamins A & D 5000-400 units Cap 1 Orally qd (Patient not taking: Reported on 4/17/2025)       No facility-administered encounter medications on file as of 4/17/2025.     ROS           Objective     /80 (BP Location: Left arm, Patient Position: Sitting, BP Cuff Size: Adult)   Pulse 80   Resp 16   Ht 1.651 m (5' 5\")   Wt 67.6 kg (149 lb)   SpO2 97%   BMI 24.79 kg/m²     Physical Exam  Constitutional:       General: She is not in acute distress.     Appearance: She is not diaphoretic.   Eyes:      General: No scleral icterus.  Neck:      Vascular: No JVD.   Cardiovascular:      Rate and Rhythm: Normal rate.      Heart sounds: Normal heart sounds. No murmur heard.     No friction rub. No " gallop.   Pulmonary:      Effort: No respiratory distress.      Breath sounds: No wheezing or rales.   Abdominal:      General: Bowel sounds are normal.      Palpations: Abdomen is soft.   Musculoskeletal:      Right lower leg: No edema.      Left lower leg: No edema.   Skin:     Findings: No rash.   Neurological:      Mental Status: She is alert. Mental status is at baseline.   Psychiatric:         Mood and Affect: Mood normal.            We reviewed in person the most recent labs  Recent Results (from the past 30 weeks)   Comp Metabolic Panel    Collection Time: 02/14/25 10:10 AM   Result Value Ref Range    Sodium 142 135 - 145 mmol/L    Potassium 4.9 3.6 - 5.5 mmol/L    Chloride 104 96 - 112 mmol/L    Co2 27 20 - 33 mmol/L    Anion Gap 11.0 7.0 - 16.0    Glucose 82 65 - 99 mg/dL    Bun 15 8 - 22 mg/dL    Creatinine 0.88 0.50 - 1.40 mg/dL    Calcium 9.4 8.5 - 10.5 mg/dL    Correct Calcium 9.2 8.5 - 10.5 mg/dL    AST(SGOT) 20 12 - 45 U/L    ALT(SGPT) 22 2 - 50 U/L    Alkaline Phosphatase 80 30 - 99 U/L    Total Bilirubin 0.3 0.1 - 1.5 mg/dL    Albumin 4.2 3.2 - 4.9 g/dL    Total Protein 6.8 6.0 - 8.2 g/dL    Globulin 2.6 1.9 - 3.5 g/dL    A-G Ratio 1.6 g/dL   CBC WITH DIFFERENTIAL    Collection Time: 02/14/25 10:10 AM   Result Value Ref Range    WBC 4.4 (L) 4.8 - 10.8 K/uL    RBC 4.25 4.20 - 5.40 M/uL    Hemoglobin 12.9 12.0 - 16.0 g/dL    Hematocrit 38.8 37.0 - 47.0 %    MCV 91.3 81.4 - 97.8 fL    MCH 30.4 27.0 - 33.0 pg    MCHC 33.2 32.2 - 35.5 g/dL    RDW 41.4 35.9 - 50.0 fL    Platelet Count 358 164 - 446 K/uL    MPV 9.3 9.0 - 12.9 fL    Neutrophils-Polys 48.60 44.00 - 72.00 %    Lymphocytes 41.40 (H) 22.00 - 41.00 %    Monocytes 7.80 0.00 - 13.40 %    Eosinophils 0.90 0.00 - 6.90 %    Basophils 1.10 0.00 - 1.80 %    Immature Granulocytes 0.20 0.00 - 0.90 %    Nucleated RBC 0.00 0.00 - 0.20 /100 WBC    Neutrophils (Absolute) 2.12 1.82 - 7.42 K/uL    Lymphs (Absolute) 1.81 1.00 - 4.80 K/uL    Monos (Absolute)  0.34 0.00 - 0.85 K/uL    Eos (Absolute) 0.04 0.00 - 0.51 K/uL    Baso (Absolute) 0.05 0.00 - 0.12 K/uL    Immature Granulocytes (abs) 0.01 0.00 - 0.11 K/uL    NRBC (Absolute) 0.00 K/uL   Lipid Profile    Collection Time: 02/14/25 10:10 AM   Result Value Ref Range    Cholesterol,Tot 187 100 - 199 mg/dL    Triglycerides 215 (H) 0 - 149 mg/dL    HDL 43 >=40 mg/dL     (H) <100 mg/dL   TSH    Collection Time: 02/14/25 10:10 AM   Result Value Ref Range    TSH 1.910 0.350 - 5.500 uIU/mL   MAGNESIUM    Collection Time: 02/14/25 10:10 AM   Result Value Ref Range    Magnesium 2.1 1.5 - 2.5 mg/dL   IRON    Collection Time: 02/14/25 10:10 AM   Result Value Ref Range    Iron 87 40 - 170 ug/dL   URIC ACID    Collection Time: 02/14/25 10:10 AM   Result Value Ref Range    Uric Acid 3.3 1.9 - 8.2 mg/dL   URINALYSIS,CULTURE IF INDICATED    Collection Time: 02/14/25 10:10 AM   Result Value Ref Range    Color Yellow     Character Clear     Specific Gravity 1.015 <1.035    Ph 7.5 5.0 - 8.0    Glucose Negative Negative mg/dL    Ketones Negative Negative mg/dL    Protein Negative Negative mg/dL    Bilirubin Negative Negative    Urobilinogen, Urine 0.2 <=1.0 EU/dL    Nitrite Negative Negative    Leukocyte Esterase Negative Negative    Occult Blood Trace (A) Negative    Micro Urine Req Microscopic    FASTING STATUS    Collection Time: 02/14/25 10:10 AM   Result Value Ref Range    Fasting Status Fasting    URINE MICROSCOPIC (W/UA)    Collection Time: 02/14/25 10:10 AM   Result Value Ref Range    WBC 0-2 /hpf    RBC 0-2 0 - 2 /hpf    Bacteria None Seen None /hpf    Epithelial Cells 0-2 0 - 5 /hpf    Urine Casts 0-2 0 - 2 /lpf   ESTIMATED GFR    Collection Time: 02/14/25 10:10 AM   Result Value Ref Range    GFR (CKD-EPI) 71 >60 mL/min/1.73 m 2   CBC WITH DIFFERENTIAL    Collection Time: 02/26/25  9:09 AM   Result Value Ref Range    WBC 4.1 (L) 4.8 - 10.8 K/uL    RBC 4.08 (L) 4.20 - 5.40 M/uL    Hemoglobin 12.7 12.0 - 16.0 g/dL     Hematocrit 36.9 (L) 37.0 - 47.0 %    MCV 90.4 81.4 - 97.8 fL    MCH 31.1 27.0 - 33.0 pg    MCHC 34.4 32.2 - 35.5 g/dL    RDW 41.7 35.9 - 50.0 fL    Platelet Count 253 164 - 446 K/uL    MPV 9.5 9.0 - 12.9 fL    Neutrophils-Polys 48.50 44.00 - 72.00 %    Lymphocytes 38.40 22.00 - 41.00 %    Monocytes 9.20 0.00 - 13.40 %    Eosinophils 2.90 0.00 - 6.90 %    Basophils 1.00 0.00 - 1.80 %    Immature Granulocytes 0.00 0.00 - 0.90 %    Nucleated RBC 0.00 0.00 - 0.20 /100 WBC    Neutrophils (Absolute) 2.01 1.82 - 7.42 K/uL    Lymphs (Absolute) 1.59 1.00 - 4.80 K/uL    Monos (Absolute) 0.38 0.00 - 0.85 K/uL    Eos (Absolute) 0.12 0.00 - 0.51 K/uL    Baso (Absolute) 0.04 0.00 - 0.12 K/uL    Immature Granulocytes (abs) 0.00 0.00 - 0.11 K/uL    NRBC (Absolute) 0.00 K/uL   Comp Metabolic Panel    Collection Time: 02/26/25  9:09 AM   Result Value Ref Range    Sodium 137 135 - 145 mmol/L    Potassium 4.0 3.6 - 5.5 mmol/L    Chloride 102 96 - 112 mmol/L    Co2 24 20 - 33 mmol/L    Anion Gap 11.0 7.0 - 16.0    Glucose 82 65 - 99 mg/dL    Bun 11 8 - 22 mg/dL    Creatinine 0.83 0.50 - 1.40 mg/dL    Calcium 9.2 8.5 - 10.5 mg/dL    Correct Calcium 9.1 8.5 - 10.5 mg/dL    AST(SGOT) 34 12 - 45 U/L    ALT(SGPT) 37 2 - 50 U/L    Alkaline Phosphatase 70 30 - 99 U/L    Total Bilirubin 0.4 0.1 - 1.5 mg/dL    Albumin 4.1 3.2 - 4.9 g/dL    Total Protein 6.7 6.0 - 8.2 g/dL    Globulin 2.6 1.9 - 3.5 g/dL    A-G Ratio 1.6 g/dL   Lipid Profile    Collection Time: 02/26/25  9:09 AM   Result Value Ref Range    Cholesterol,Tot 178 100 - 199 mg/dL    Triglycerides 174 (H) 0 - 149 mg/dL    HDL 42 >=40 mg/dL     (H) <100 mg/dL   FASTING STATUS    Collection Time: 02/26/25  9:09 AM   Result Value Ref Range    Fasting Status Fasting    MAGNESIUM    Collection Time: 02/26/25  9:09 AM   Result Value Ref Range    Magnesium 2.1 1.5 - 2.5 mg/dL   IRON    Collection Time: 02/26/25  9:09 AM   Result Value Ref Range    Iron 74 40 - 170 ug/dL   FERRITIN     Collection Time: 02/26/25  9:09 AM   Result Value Ref Range    Ferritin 210.0 10.0 - 291.0 ng/mL   PHOSPHORUS    Collection Time: 02/26/25  9:09 AM   Result Value Ref Range    Phosphorus 3.2 2.5 - 4.5 mg/dL   TSH    Collection Time: 02/26/25  9:09 AM   Result Value Ref Range    TSH 1.700 0.350 - 5.500 uIU/mL   URIC ACID    Collection Time: 02/26/25  9:09 AM   Result Value Ref Range    Uric Acid 3.0 1.9 - 8.2 mg/dL   URINALYSIS,CULTURE IF INDICATED    Collection Time: 02/26/25  9:09 AM   Result Value Ref Range    Color Yellow     Character Clear     Specific Gravity 1.014 <1.035    Ph 8.0 5.0 - 8.0    Glucose Negative Negative mg/dL    Ketones Negative Negative mg/dL    Protein Negative Negative mg/dL    Bilirubin Negative Negative    Urobilinogen, Urine 0.2 <=1.0 EU/dL    Nitrite Negative Negative    Leukocyte Esterase Negative Negative    Occult Blood Small (A) Negative    Micro Urine Req Microscopic    URINE MICROSCOPIC (W/UA)    Collection Time: 02/26/25  9:09 AM   Result Value Ref Range    WBC 0-2 /hpf    RBC 6-10 (A) 0 - 2 /hpf    Bacteria None Seen None /hpf    Epithelial Cells 0-2 0 - 5 /hpf    Urine Casts 0-2 0 - 2 /lpf   ESTIMATED GFR    Collection Time: 02/26/25  9:09 AM   Result Value Ref Range    GFR (CKD-EPI) 76 >60 mL/min/1.73 m 2   CBC WITH DIFFERENTIAL    Collection Time: 03/05/25  8:03 AM   Result Value Ref Range    WBC 5.9 4.8 - 10.8 K/uL    RBC 4.32 4.20 - 5.40 M/uL    Hemoglobin 13.0 12.0 - 16.0 g/dL    Hematocrit 40.3 37.0 - 47.0 %    MCV 93.3 81.4 - 97.8 fL    MCH 30.1 27.0 - 33.0 pg    MCHC 32.3 32.2 - 35.5 g/dL    RDW 43.1 35.9 - 50.0 fL    Platelet Count 414 164 - 446 K/uL    MPV 9.3 9.0 - 12.9 fL    Neutrophils-Polys 44.30 44.00 - 72.00 %    Lymphocytes 45.30 (H) 22.00 - 41.00 %    Monocytes 6.80 0.00 - 13.40 %    Eosinophils 1.90 0.00 - 6.90 %    Basophils 1.20 0.00 - 1.80 %    Immature Granulocytes 0.50 0.00 - 0.90 %    Nucleated RBC 0.00 0.00 - 0.20 /100 WBC    Neutrophils (Absolute)  2.60 1.82 - 7.42 K/uL    Lymphs (Absolute) 2.66 1.00 - 4.80 K/uL    Monos (Absolute) 0.40 0.00 - 0.85 K/uL    Eos (Absolute) 0.11 0.00 - 0.51 K/uL    Baso (Absolute) 0.07 0.00 - 0.12 K/uL    Immature Granulocytes (abs) 0.03 0.00 - 0.11 K/uL    NRBC (Absolute) 0.00 K/uL   Comp Metabolic Panel    Collection Time: 03/05/25  8:03 AM   Result Value Ref Range    Sodium 140 135 - 145 mmol/L    Potassium 4.8 3.6 - 5.5 mmol/L    Chloride 102 96 - 112 mmol/L    Co2 26 20 - 33 mmol/L    Anion Gap 12.0 7.0 - 16.0    Glucose 85 65 - 99 mg/dL    Bun 13 8 - 22 mg/dL    Creatinine 0.96 0.50 - 1.40 mg/dL    Calcium 9.4 8.5 - 10.5 mg/dL    Correct Calcium 9.1 8.5 - 10.5 mg/dL    AST(SGOT) 20 12 - 45 U/L    ALT(SGPT) 23 2 - 50 U/L    Alkaline Phosphatase 72 30 - 99 U/L    Total Bilirubin 0.3 0.1 - 1.5 mg/dL    Albumin 4.4 3.2 - 4.9 g/dL    Total Protein 7.1 6.0 - 8.2 g/dL    Globulin 2.7 1.9 - 3.5 g/dL    A-G Ratio 1.6 g/dL   Lipid Profile    Collection Time: 03/05/25  8:03 AM   Result Value Ref Range    Cholesterol,Tot 198 100 - 199 mg/dL    Triglycerides 246 (H) 0 - 149 mg/dL    HDL 41 >=40 mg/dL     (H) <100 mg/dL   FASTING STATUS    Collection Time: 03/05/25  8:03 AM   Result Value Ref Range    Fasting Status Fasting    MAGNESIUM    Collection Time: 03/05/25  8:03 AM   Result Value Ref Range    Magnesium 2.3 1.5 - 2.5 mg/dL   IRON    Collection Time: 03/05/25  8:03 AM   Result Value Ref Range    Iron 107 40 - 170 ug/dL   FERRITIN    Collection Time: 03/05/25  8:03 AM   Result Value Ref Range    Ferritin 237.0 10.0 - 291.0 ng/mL   PHOSPHORUS    Collection Time: 03/05/25  8:03 AM   Result Value Ref Range    Phosphorus 3.8 2.5 - 4.5 mg/dL   TSH    Collection Time: 03/05/25  8:03 AM   Result Value Ref Range    TSH 2.630 0.350 - 5.500 uIU/mL   URIC ACID    Collection Time: 03/05/25  8:03 AM   Result Value Ref Range    Uric Acid 3.7 1.9 - 8.2 mg/dL   URINALYSIS,CULTURE IF INDICATED    Collection Time: 03/05/25  8:03 AM   Result  Value Ref Range    Color Yellow     Character Clear     Specific Gravity 1.016 <1.035    Ph 8.0 5.0 - 8.0    Glucose Negative Negative mg/dL    Ketones Negative Negative mg/dL    Protein Negative Negative mg/dL    Bilirubin Negative Negative    Urobilinogen, Urine 0.2 <=1.0 EU/dL    Nitrite Negative Negative    Leukocyte Esterase Negative Negative    Occult Blood Negative Negative    Micro Urine Req see below    ESTIMATED GFR    Collection Time: 03/05/25  8:03 AM   Result Value Ref Range    GFR (CKD-EPI) 64 >60 mL/min/1.73 m 2   CBC WITH DIFFERENTIAL    Collection Time: 03/12/25  8:15 AM   Result Value Ref Range    WBC 4.2 (L) 4.8 - 10.8 K/uL    RBC 3.96 (L) 4.20 - 5.40 M/uL    Hemoglobin 11.8 (L) 12.0 - 16.0 g/dL    Hematocrit 36.0 (L) 37.0 - 47.0 %    MCV 90.9 81.4 - 97.8 fL    MCH 29.8 27.0 - 33.0 pg    MCHC 32.8 32.2 - 35.5 g/dL    RDW 41.6 35.9 - 50.0 fL    Platelet Count 325 164 - 446 K/uL    MPV 9.4 9.0 - 12.9 fL    Neutrophils-Polys 45.30 44.00 - 72.00 %    Lymphocytes 41.60 (H) 22.00 - 41.00 %    Monocytes 9.10 0.00 - 13.40 %    Eosinophils 2.40 0.00 - 6.90 %    Basophils 1.40 0.00 - 1.80 %    Immature Granulocytes 0.20 0.00 - 0.90 %    Nucleated RBC 0.00 0.00 - 0.20 /100 WBC    Neutrophils (Absolute) 1.89 1.82 - 7.42 K/uL    Lymphs (Absolute) 1.74 1.00 - 4.80 K/uL    Monos (Absolute) 0.38 0.00 - 0.85 K/uL    Eos (Absolute) 0.10 0.00 - 0.51 K/uL    Baso (Absolute) 0.06 0.00 - 0.12 K/uL    Immature Granulocytes (abs) 0.01 0.00 - 0.11 K/uL    NRBC (Absolute) 0.00 K/uL   Comp Metabolic Panel    Collection Time: 03/12/25  8:15 AM   Result Value Ref Range    Sodium 138 135 - 145 mmol/L    Potassium 4.1 3.6 - 5.5 mmol/L    Chloride 106 96 - 112 mmol/L    Co2 21 20 - 33 mmol/L    Anion Gap 11.0 7.0 - 16.0    Glucose 90 65 - 99 mg/dL    Bun 11 8 - 22 mg/dL    Creatinine 0.87 0.50 - 1.40 mg/dL    Calcium 9.2 8.5 - 10.5 mg/dL    Correct Calcium 9.3 8.5 - 10.5 mg/dL    AST(SGOT) 20 12 - 45 U/L    ALT(SGPT) 23 2 - 50  U/L    Alkaline Phosphatase 60 30 - 99 U/L    Total Bilirubin 0.2 0.1 - 1.5 mg/dL    Albumin 3.9 3.2 - 4.9 g/dL    Total Protein 6.5 6.0 - 8.2 g/dL    Globulin 2.6 1.9 - 3.5 g/dL    A-G Ratio 1.5 g/dL   Lipid Profile    Collection Time: 03/12/25  8:15 AM   Result Value Ref Range    Cholesterol,Tot 183 100 - 199 mg/dL    Triglycerides 152 (H) 0 - 149 mg/dL    HDL 44 >=40 mg/dL     (H) <100 mg/dL   FASTING STATUS    Collection Time: 03/12/25  8:15 AM   Result Value Ref Range    Fasting Status Fasting    MAGNESIUM    Collection Time: 03/12/25  8:15 AM   Result Value Ref Range    Magnesium 2.0 1.5 - 2.5 mg/dL   FERRITIN    Collection Time: 03/12/25  8:15 AM   Result Value Ref Range    Ferritin 264.0 10.0 - 291.0 ng/mL   IRON    Collection Time: 03/12/25  8:15 AM   Result Value Ref Range    Iron 42 40 - 170 ug/dL   URIC ACID    Collection Time: 03/12/25  8:15 AM   Result Value Ref Range    Uric Acid 3.4 1.9 - 8.2 mg/dL   PHOSPHORUS    Collection Time: 03/12/25  8:15 AM   Result Value Ref Range    Phosphorus 3.6 2.5 - 4.5 mg/dL   TSH    Collection Time: 03/12/25  8:15 AM   Result Value Ref Range    TSH 3.560 0.350 - 5.500 uIU/mL   URINALYSIS,CULTURE IF INDICATED    Collection Time: 03/12/25  8:15 AM   Result Value Ref Range    Color Yellow     Character Clear     Specific Gravity 1.010 <1.035    Ph 6.0 5.0 - 8.0    Glucose Negative Negative mg/dL    Ketones Negative Negative mg/dL    Protein Negative Negative mg/dL    Bilirubin Negative Negative    Urobilinogen, Urine 0.2 <=1.0 EU/dL    Nitrite Negative Negative    Leukocyte Esterase Negative Negative    Occult Blood Small (A) Negative    Micro Urine Req Microscopic    ESTIMATED GFR    Collection Time: 03/12/25  8:15 AM   Result Value Ref Range    GFR (CKD-EPI) 72 >60 mL/min/1.73 m 2   URINE MICROSCOPIC (W/UA)    Collection Time: 03/12/25  8:15 AM   Result Value Ref Range    WBC 0-2 /hpf    RBC 0-2 0 - 2 /hpf    Bacteria Rare (A) None /hpf    Epithelial Cells 0-2 0  - 5 /hpf    Urine Casts 0-2 0 - 2 /lpf    Hyaline Cast Present /lpf   OCCULT BLOOD STOOL    Collection Time: 03/22/25  4:00 AM   Result Value Ref Range    Occult Blood Feces Negative Negative   CBC WITH DIFFERENTIAL    Collection Time: 03/22/25 10:11 AM   Result Value Ref Range    WBC 4.3 (L) 4.8 - 10.8 K/uL    RBC 4.16 (L) 4.20 - 5.40 M/uL    Hemoglobin 12.7 12.0 - 16.0 g/dL    Hematocrit 38.8 37.0 - 47.0 %    MCV 93.3 81.4 - 97.8 fL    MCH 30.5 27.0 - 33.0 pg    MCHC 32.7 32.2 - 35.5 g/dL    RDW 44.5 35.9 - 50.0 fL    Platelet Count 348 164 - 446 K/uL    MPV 9.7 9.0 - 12.9 fL    Neutrophils-Polys 49.20 44.00 - 72.00 %    Lymphocytes 40.20 22.00 - 41.00 %    Monocytes 8.00 0.00 - 13.40 %    Eosinophils 1.20 0.00 - 6.90 %    Basophils 1.20 0.00 - 1.80 %    Immature Granulocytes 0.20 0.00 - 0.90 %    Nucleated RBC 0.00 0.00 - 0.20 /100 WBC    Neutrophils (Absolute) 2.09 1.82 - 7.42 K/uL    Lymphs (Absolute) 1.71 1.00 - 4.80 K/uL    Monos (Absolute) 0.34 0.00 - 0.85 K/uL    Eos (Absolute) 0.05 0.00 - 0.51 K/uL    Baso (Absolute) 0.05 0.00 - 0.12 K/uL    Immature Granulocytes (abs) 0.01 0.00 - 0.11 K/uL    NRBC (Absolute) 0.00 K/uL   Comp Metabolic Panel    Collection Time: 03/22/25 10:11 AM   Result Value Ref Range    Sodium 139 135 - 145 mmol/L    Potassium 4.2 3.6 - 5.5 mmol/L    Chloride 102 96 - 112 mmol/L    Co2 25 20 - 33 mmol/L    Anion Gap 12.0 7.0 - 16.0    Glucose 87 65 - 99 mg/dL    Bun 9 8 - 22 mg/dL    Creatinine 0.80 0.50 - 1.40 mg/dL    Calcium 9.7 8.5 - 10.5 mg/dL    Correct Calcium 9.2 8.5 - 10.5 mg/dL    AST(SGOT) 25 12 - 45 U/L    ALT(SGPT) 23 2 - 50 U/L    Alkaline Phosphatase 68 30 - 99 U/L    Total Bilirubin 0.4 0.1 - 1.5 mg/dL    Albumin 4.6 3.2 - 4.9 g/dL    Total Protein 7.2 6.0 - 8.2 g/dL    Globulin 2.6 1.9 - 3.5 g/dL    A-G Ratio 1.8 g/dL   Lipid Profile    Collection Time: 03/22/25 10:11 AM   Result Value Ref Range    Cholesterol,Tot 198 100 - 199 mg/dL    Triglycerides 144 0 - 149 mg/dL     HDL 53 >=40 mg/dL     (H) <100 mg/dL   MAGNESIUM    Collection Time: 03/22/25 10:11 AM   Result Value Ref Range    Magnesium 2.1 1.5 - 2.5 mg/dL   IRON    Collection Time: 03/22/25 10:11 AM   Result Value Ref Range    Iron 87 40 - 170 ug/dL   FERRITIN    Collection Time: 03/22/25 10:11 AM   Result Value Ref Range    Ferritin 218.0 10.0 - 291.0 ng/mL   PHOSPHORUS    Collection Time: 03/22/25 10:11 AM   Result Value Ref Range    Phosphorus 3.6 2.5 - 4.5 mg/dL   URIC ACID    Collection Time: 03/22/25 10:11 AM   Result Value Ref Range    Uric Acid 3.5 1.9 - 8.2 mg/dL   URINALYSIS,CULTURE IF INDICATED    Collection Time: 03/22/25 10:11 AM   Result Value Ref Range    Color Yellow     Character Clear     Specific Gravity 1.013 <1.035    Ph 7.0 5.0 - 8.0    Glucose Negative Negative mg/dL    Ketones Negative Negative mg/dL    Protein Negative Negative mg/dL    Bilirubin Negative Negative    Urobilinogen, Urine 0.2 <=1.0 EU/dL    Nitrite Negative Negative    Leukocyte Esterase Negative Negative    Occult Blood Trace (A) Negative    Micro Urine Req Microscopic    TSH    Collection Time: 03/22/25 10:11 AM   Result Value Ref Range    TSH 2.080 0.350 - 5.500 uIU/mL   URINE MICROSCOPIC (W/UA)    Collection Time: 03/22/25 10:11 AM   Result Value Ref Range    WBC 0-2 /hpf    RBC 0-2 0 - 2 /hpf    Bacteria None Seen None /hpf    Epithelial Cells 0-2 0 - 5 /hpf    Urine Casts 0-2 0 - 2 /lpf   ESTIMATED GFR    Collection Time: 03/22/25 10:11 AM   Result Value Ref Range    GFR (CKD-EPI) 79 >60 mL/min/1.73 m 2   EC-ECHOCARDIOGRAM COMPLETE W/O CONT    Collection Time: 04/09/25  2:43 PM   Result Value Ref Range    Eject.Frac. MOD BP 54.89     Eject.Frac. MOD 4C 55.25     Eject.Frac. MOD 2C 53.28          Assessment & Plan     1. Primary hypertension        2. Hypertriglyceridemia  CT-CARDIAC SCORING    Omega-3 Fatty Acids (FISH OIL) 1000 MG Cap capsule      3. Dyspnea on exertion  EC-ECHOCARDIOGRAM REST/STRESS W/O CONT(DOES NOT  INCLUDE A FULL RESTING ECHO)      4. Precordial chest pain  EC-ECHOCARDIOGRAM REST/STRESS W/O CONT(DOES NOT INCLUDE A FULL RESTING ECHO)      5. Encounter for cardiac risk counseling            Medical Decision Making: Today's Assessment/Status/Plan:        It was my pleasure to meet with Ms. Schumacher.    We addressed the management of hypertension at today's visit. Blood pressure is well controlled.  We specifically assessed the labs on hypertension treatment    The 10-year ASCVD risk score (Sunny DK, et al., 2019) is: 11.1%  Will update with a CAC    Stress echo for HOWARD and back pressure    We will follow up with Ms. Schumacher on the results of the testing with MyChart or over the phone. We will determine further follow-up from there.    It is my pleasure to participate in the care of Ms. Schumacher.  Please do not hesitate to contact me with questions or concerns.    Segundo Piedra MD PhD [facility]  Cardiologist [facility]    Please note that this dictation was created using voice recognition software. There may be errors I did not discover before finalizing the note.     4/17/2025  3:02 PM

## 2025-04-17 NOTE — PATIENT INSTRUCTIONS
NATURAL SUPPLEMENTS  A total of 884 randomized controlled intervention trials evaluating 27 types of micronutrients among [phone],627 participants (4,895,544 person-years) were identified. Supplementation with n-3 fatty acid, n-6 fatty acid, l-arginine, l-citrulline, folic acid, vitamin D, magnesium, zinc, ?-lipoic acid, coenzyme Q10, melatonin, catechin, curcumin, flavanol, genistein, and quercetin showed moderate- to high-quality evidence for reducing CVD risk factors. Specifically, n-3 fatty acid supplementation decreased CVD mortality (relative risk [RR]: 0.93; 95% CI: 0.88-0.97), myocardial infarction (RR: 0.85; 95% CI: 0.78-0.92), and coronary heart disease events (RR: 0.86; 95% CI: 0.80-0.93). Folic acid supplementation decreased stroke risk (RR: 0.84; 95% CI: 0.72-0.97), and coenzyme Q10 supplementation decreased all-cause mortality events (RR: 0.68; 95% CI: 0.49-0.94). Vitamin C, vitamin D, vitamin E, and selenium showed no effect on CVD or type 2 diabetes risk. ?-carotene supplementation increased all-cause mortality (RR: 1.10; 95% CI: 1.05-1.15), CVD mortality events (RR: 1.12; 95% CI: 1.06-1.18), and stroke risk (RR: 1.09; 95% CI: 1.01-1.17).           https://www.jacc.org/doi/10.1016/j.jacc.2022.09.048   Work on at least 2.5 - 5 hours a week of moderate exercise (typical brisk walking or similar activity)    If you have had a heart attack, stent, bypass or reduced heart strength (EF <35%): cardiac rehab may reduce your risk of dying by 13-24% and need to go to the hospital by 30% within the first year (1)    Please look into the following diets and incorporate them into your diet    CONSIDER CHECKING A CALCIUM SCORE TO UNDERSTAND YOUR RISK MORE    https://internal.reardon-nhlbi.org/about/procedures/tools/muwd-ykyrw-lbov-calculator    LOW SALT DIET   KEEP YOUR SODIUM EQUAL TO CALORIES AND NO MORE THAN DOUBLE THE CALORIES FOR A LOW SALT DIET    Cardiosmart.org - great resource for American College of Cardiology  on heart disease prevention and treatment    FOR TREATMENT OR PREVENTION OF CORONARY ARTERY DISEASE  These three programs are approved by Medicare/Insurers for those with heart disease  [facility]  Dr. Mckeon's Program for Reversing Heart Disease - Jignesh Kendricks Cardiologist vegetarian-based  [facility]    Mediterranean Diet has been shown to be a hearty healthy diet.    This is a commonly referenced Program  Dr England - Mongo over Knives (book and documentary) - vegetarian-based    FOR TREATMENT OF BLOOD PRESSURE  DASH DIET - American Heart Association for treatment of HYPERTENSION    FOR TREATMENT OF BAD CHOLESTEROL/FATS  REDUCE PROCESSED SUGAR AS MUCH AS POSSIBLE  INCREASE WHOLE GRAINS/VEGETABLES  INCREASE FIBER    Lowering total cholesterol and LDL (bad) cholesterol:  - Eat leaner cuts of meat, or eliminate altogether if possible red meat, and frequently substitute fish or chicken.  - Limit saturated fat to no more than 7-10% of total calories no more than 10 g per day is recommended. Some sources of saturated fat include butter, animal fats, hydrogenated vegetable fats and oils, many desserts, whole milk dairy products.  - Replaced saturated fats with polyunsaturated fats and monounsaturated fats. Foods high in monounsaturated fat include nuts, canola oil, avocados, and olives.  - Limit trans fat (processed foods) and replaced with fresh fruits and vegetables  - Recommend nonfat dairy products  - Increase substantially the amount of soluble fiber intake (legumes such as beans, fruit, whole grains).  - Consider nutritional supplements: plant sterile spreads such as Benecol, fish oil,  flaxseed oil, omega-3 acids capsules 1000 mg twice a day, or viscous fiber such as Metamucil  - Attain ideal weight and regular exercise (at least 30 minutes per day of moderate exercise)  ASK ABOUT STATIN OR NON STATIN  MEDICATION TO REDUCE YOUR LDL AND HEART RISK    Lowering triglycerides:  - Reduce intake of simple sugar: Desserts, candy, pastries, honey, sodas, sugared cereals, yogurt, Gatorade, sports bars, canned fruit, smoothies, fruit juice, coffee drinks  - Reduced intake of refined starches: Refined Pasta, most bread  - Reduce or abstain from alcohol  - Increase omega-3 fatty acids: [place], Trout, Mackerel, Herring, Albacore tuna and supplements  - Attain ideal weight and regular exercise (at least 30 minutes per day of moderate exercise)  ASK ABOUT PURIFIED OMEGA 3 EPA or FISH OIL TO REDUCE YOUR TG AND HEART RISK    Elevating HDL (good) cholesterol:  - Increase physical activity  - Increase omega-3 fatty acids and supplements as listed above  - Incorporating appropriate amounts of monounsaturated fats such as nuts, olive oil, canola oil, avocados, olives  - Stop smoking  - Attain ideal weight and regular exercise (at least 30 minutes per day of moderate exercise)

## 2025-04-24 NOTE — Clinical Note
[facility]  [address]  [place] [address]    [id number]    Deya Schumacher  [address]  [place] [address]    April 24, 2025    Member Name: Radha Schumacher   Member Number: [id number]   Reference Number: [id number]   Approved Services: Echos and EKG   Approved Service Dates: 04/17/2025 - 07/16/2025   Requesting Provider: Segundo Piedra   Requested Provider: [facility]     Dear Radha Schumacher:    The following medical service(s) requested by Segundo Piedra have been approved:    Procedure Code Procedure Code Name Requested Quantity Approved Quantity Status   68909 (CPT®) [place] ECHO HEART XTHORACIC, STRESS/REST 1 1 Authorized       Approved Quantity means the number of visits approved for medication treatments and/or medical services.    The services should be provided by [facility] no later than 07/16/2025. Please contact the provider listed below with any questions.     Provider Information:  [facility]  [phone]    Your plan benefit may require a deductible, co-payment or coinsurance for these services. This authorization does not guarantee [facility] will pay the claim for services that you receive. Payment by [facility] for these services is subject to the terms of your Evidence of Coverage, your eligibility at the time of service, and confirmation of benefit coverage.    For any questions or additional information, please contact Customer Service:    [facility] Toll Free: [phone]  TTY users dial: [phone]   Call Center Hours:  Oct 1 - Mar 31, Mon - Fri 7 AM to 8 PM PST  Oct 1 - Mar 31, Sat - Sun 8 AM to 8 PM PST  Apr 1 - Sep 30, Mon - Fri 7 AM to 8 PM PST   Office Hours: Mon - Fri 8 AM to 5 PM PST   E-mail: [email or web address]   Website:  [email or web address]      This information is available for free in other languages. Please contact Customer Service at  the phone number above for more information. [facility] complies with applicable Federal civil rights laws and does not discriminate on the basis of race, color, national origin, age, disability or sex.    Sincerely,     Healthcare Utilization Management Department     Cc: [facility]   Segundo Piedra    Multi-Language Insert  Multi- Services  English: We have free  services to answer any questions you may have about our health or drug plan.  To get an , just call us at [phone].  Someone who speaks English/Language can help you.  This is a free service.  Croatian: Tenemos servicios de intérprete sin costo alguno  para responder cualquier pregunta que pueda tener sobre nuestro plan de awilda o medicamentos. Para hablar con un intérprete, por favor llame al [phone]. Alguien que hable español le podrá ayudar. Mariah es un servicio gratuito.  Chinese Mandarin: ?????????????????????????????? ???????????????? [phone]????????????????? ?????????  Chinese Cantonese: ?????????????????????????????? ????????????? [phone]???????????????????? ????????  Tagalog:  Cody weber serbisyo sa rodriguezsamu patiñoa gerald araujo hinggil sa jose eduardo bond o panggamot.  pipo Max  [phone]. Francis brown Tagalog.  Landon evans.  Korean:  Nous proposons selena services gratuits d'interprétation pour répondre à toutes ronny questions relatives à notre régime de santé ou d'assurance-médicaments. Pour accéder au service d'interprétation, il vous suffit de nous appeler au [phone]. Un interlocuteur parlant Français pourra vous aider. Ce service est gratuit.  Tamazight:  Leslie larai có d?ch v? thông d?ch mi?n phí ð? tr? l?i các câu h?i v? chýõng s?c kh?e và chýõng trình thu?c men. N?u quí v? c?n  thông d?ch viên missael g?i [phone] s? có nhân viên nói ti?ng Vi?t giúp ð? quí v?. Ðây là d?ch v? mi?n phí .  Sammarinese:  Unser kostenloser Dolmetscherservice beantwortet Ihren Fragen zu unserem Gesundheits- und Arzneimittelplan. Unsere Dolmetscher erreichen Sie [phone]. Man wird Ihnen beverly auf [facility]. Dieser Service ist [facility].  Czech:  ??? ?? ?? ?? ?? ??? ?? ??? ?? ???? ?? ?? ???? ???? ????. ?? ???? ????? ?? [phone] ??? ??? ????.  ???? ?? ???? ?? ?? ????. ? ???? ??? ?????.   Saudi Arabian: Åñëè ó âàñ âîçíèêíóò âîïðîñû îòíîñèòåëüíî ñòðàõîâîãî èëè ìåäèêàìåíòíîãî ïëàíà, âû ìîæåòå âîñïîëüçîâàòüñÿ íàøèìè áåñïëàòíûìè óñëóãàìè ïåðåâîä÷èêîâ. ×òîáû âîñïîëüçîâàòüñÿ óñëóãàìè ïåðåâîä÷èêà, ïîçâîíèòå íàì ïî òåëåôîíó [phone]. Âàì îêàæåò ïîìîùü ñîòðóäíèê, êîòîðûé ãîâîðèò ïî-póññêè. Äàííàÿ óñëóãà áåñïëàòíàÿ.  Romansh: ÅääÇ äÞÏã ÎÏãÇÊ ÇáãÊÑÌã ÇáÝæÑí ÇáãÌÇäíÉ ááÅÌÇÈÉ Úä Ãí ÃÓÆáÉ ÊÊÚáÞ ÈÇáÕÍÉ Ãæ ÌÏæá ÇáÃÏæíÉ áÏíäÇ. ááÍÕæá Úáì ãÊÑÌã ÝæÑí¡ áíÓ Úáíß Óæì ÇáÇÊÕÇá ÈäÇ Úáì [phone] . ÓíÞæã ÔÎÕ ãÇ íÊÍÏË ÇáÚÑÈíÉ ÈãÓÇÚÏÊß. åÐå ÎÏãÉ ãÌÇäíÉ.  Nayeli: ????? ????????? ?? ??? ?? ????? ?? ???? ??? ???? ???? ?? ?????? ?? ???? ???? ?? ??? ????? ??? ????? ???????? ?????? ?????? ???. ?? ???????? ??????? ???? ?? ???, ?? ???? [phone] ?? ??? ????. ??? ??????? ?? ?????? ????? ?? ???? ??? ?? ???? ??. ?? ?? ????? ???? ??.   Romanian:  È disponibile un servizio di interpretariato gratuito per rispondere a eventuali domande sul nostro piano sanitario e farmaceutico. Per un interprete, contattare il gianluca [phone]. Un nostro incaricato sohail parla Italianovi fornirà l'assistenza necessaria. È un servizio gratuito.  Portugués:  Dispomos de serviços de interpretação gratuitos para responder a qualquer questão que tenha acerca do nosso plano de saúde ou de medicação. Para obter um intérprete, contacte-nos através do número [phone]. Irá encontrar alguém que fale o idioma  Português para o ajudar. Mariah serviço é  gratuito.  Maltese Creole:  Nou genyen sèvis entèprèt gratis addy reponn tout kesyon ou ta genyen konsènan plan medikal oswa dwòg nou an.  Addy jwenn yon entèprèt, jis rele nou nan [phone]. Yon moun ki pale Kreyòl kapab chichi w.  Sa a se yon sèvis ki gratis.  Polish:  Umo¿liwiamy bezp³atne skorzystanie z us³ug t³umacza ustnego, który pomo¿e w uzyskaniu odpowiedzi na temat planu zdrowotnego lub dawkowania annettew. Sammie skorzystaæ z pomocy t³umacza znaj¹cego romaine montgomery¿y zadzwoniæ pod numer [phone]. Ta us³uga jest bezp³atna.  Kyrgyz: ????? ??????? ????????????????????? ??????????????????????????????????[phone] ???????????????? ? ????????????????? ?????

## 2025-05-01 ENCOUNTER — RESULTS FOLLOW-UP (OUTPATIENT)
Dept: CARDIOLOGY | Facility: MEDICAL CENTER | Age: 70
End: 2025-05-01

## 2025-05-01 ENCOUNTER — HOSPITAL ENCOUNTER (OUTPATIENT)
Dept: CARDIOLOGY | Facility: MEDICAL CENTER | Age: 70
End: 2025-05-01
Attending: INTERNAL MEDICINE
Payer: MEDICARE

## 2025-05-01 DIAGNOSIS — R07.2 PRECORDIAL CHEST PAIN: ICD-10-CM

## 2025-05-01 DIAGNOSIS — R06.09 DYSPNEA ON EXERTION: ICD-10-CM

## 2025-05-01 LAB — LV EJECT FRACT  99904: 65

## 2025-05-01 PROCEDURE — 93017 CV STRESS TEST TRACING ONLY: CPT | Mod: TC

## 2025-05-01 PROCEDURE — 93018 CV STRESS TEST I&R ONLY: CPT | Performed by: INTERNAL MEDICINE

## 2025-05-01 PROCEDURE — 93350 STRESS TTE ONLY: CPT | Mod: 26 | Performed by: INTERNAL MEDICINE

## 2025-05-07 ENCOUNTER — HOSPITAL ENCOUNTER (OUTPATIENT)
Dept: LAB | Facility: MEDICAL CENTER | Age: 70
End: 2025-05-07
Attending: INTERNAL MEDICINE
Payer: MEDICARE

## 2025-05-07 LAB
APPEARANCE UR: CLEAR
BACTERIA #/AREA URNS HPF: NORMAL /HPF
BASOPHILS # BLD AUTO: 1.1 % (ref 0–1.8)
BASOPHILS # BLD: 0.05 K/UL (ref 0–0.12)
BILIRUB UR QL STRIP.AUTO: NEGATIVE
CASTS URNS QL MICRO: NORMAL /LPF (ref 0–2)
COLOR UR: YELLOW
EOSINOPHIL # BLD AUTO: 0.12 K/UL (ref 0–0.51)
EOSINOPHIL NFR BLD: 2.6 % (ref 0–6.9)
EPITHELIAL CELLS 1715: NORMAL /HPF (ref 0–5)
ERYTHROCYTE [DISTWIDTH] IN BLOOD BY AUTOMATED COUNT: 43.3 FL (ref 35.9–50)
GLUCOSE UR STRIP.AUTO-MCNC: NEGATIVE MG/DL
HCT VFR BLD AUTO: 38.1 % (ref 37–47)
HGB BLD-MCNC: 12.6 G/DL (ref 12–16)
IMM GRANULOCYTES # BLD AUTO: 0.01 K/UL (ref 0–0.11)
IMM GRANULOCYTES NFR BLD AUTO: 0.2 % (ref 0–0.9)
KETONES UR STRIP.AUTO-MCNC: NEGATIVE MG/DL
LEUKOCYTE ESTERASE UR QL STRIP.AUTO: NEGATIVE
LYMPHOCYTES # BLD AUTO: 1.93 K/UL (ref 1–4.8)
LYMPHOCYTES NFR BLD: 42 % (ref 22–41)
MCH RBC QN AUTO: 30.7 PG (ref 27–33)
MCHC RBC AUTO-ENTMCNC: 33.1 G/DL (ref 32.2–35.5)
MCV RBC AUTO: 92.9 FL (ref 81.4–97.8)
MICRO URNS: ABNORMAL
MONOCYTES # BLD AUTO: 0.29 K/UL (ref 0–0.85)
MONOCYTES NFR BLD AUTO: 6.3 % (ref 0–13.4)
NEUTROPHILS # BLD AUTO: 2.19 K/UL (ref 1.82–7.42)
NEUTROPHILS NFR BLD: 47.8 % (ref 44–72)
NITRITE UR QL STRIP.AUTO: NEGATIVE
NRBC # BLD AUTO: 0 K/UL
NRBC BLD-RTO: 0 /100 WBC (ref 0–0.2)
PH UR STRIP.AUTO: 7.5 [PH] (ref 5–8)
PLATELET # BLD AUTO: 354 K/UL (ref 164–446)
PMV BLD AUTO: 9.5 FL (ref 9–12.9)
PROT UR QL STRIP: NEGATIVE MG/DL
RBC # BLD AUTO: 4.1 M/UL (ref 4.2–5.4)
RBC # URNS HPF: NORMAL /HPF (ref 0–2)
RBC UR QL AUTO: ABNORMAL
SP GR UR STRIP.AUTO: 1.01
UROBILINOGEN UR STRIP.AUTO-MCNC: 0.2 EU/DL
WBC # BLD AUTO: 4.6 K/UL (ref 4.8–10.8)
WBC #/AREA URNS HPF: NORMAL /HPF

## 2025-05-07 PROCEDURE — 36415 COLL VENOUS BLD VENIPUNCTURE: CPT

## 2025-05-07 PROCEDURE — 84443 ASSAY THYROID STIM HORMONE: CPT

## 2025-05-07 PROCEDURE — 84550 ASSAY OF BLOOD/URIC ACID: CPT

## 2025-05-07 PROCEDURE — 81001 URINALYSIS AUTO W/SCOPE: CPT

## 2025-05-07 PROCEDURE — 83735 ASSAY OF MAGNESIUM: CPT

## 2025-05-07 PROCEDURE — 84100 ASSAY OF PHOSPHORUS: CPT

## 2025-05-07 PROCEDURE — 80061 LIPID PANEL: CPT

## 2025-05-07 PROCEDURE — 85025 COMPLETE CBC W/AUTO DIFF WBC: CPT

## 2025-05-07 PROCEDURE — 83540 ASSAY OF IRON: CPT

## 2025-05-07 PROCEDURE — 82728 ASSAY OF FERRITIN: CPT

## 2025-05-07 PROCEDURE — 80053 COMPREHEN METABOLIC PANEL: CPT

## 2025-05-08 LAB
ALBUMIN SERPL BCP-MCNC: 4.3 G/DL (ref 3.2–4.9)
ALBUMIN/GLOB SERPL: 1.6 G/DL
ALP SERPL-CCNC: 71 U/L (ref 30–99)
ALT SERPL-CCNC: 16 U/L (ref 2–50)
ANION GAP SERPL CALC-SCNC: 5 MMOL/L (ref 7–16)
AST SERPL-CCNC: 18 U/L (ref 12–45)
BILIRUB SERPL-MCNC: 0.3 MG/DL (ref 0.1–1.5)
BUN SERPL-MCNC: 14 MG/DL (ref 8–22)
CALCIUM ALBUM COR SERPL-MCNC: 9.1 MG/DL (ref 8.5–10.5)
CALCIUM SERPL-MCNC: 9.3 MG/DL (ref 8.5–10.5)
CHLORIDE SERPL-SCNC: 109 MMOL/L (ref 96–112)
CHOLEST SERPL-MCNC: 197 MG/DL (ref 100–199)
CO2 SERPL-SCNC: 24 MMOL/L (ref 20–33)
CREAT SERPL-MCNC: 0.81 MG/DL (ref 0.5–1.4)
FERRITIN SERPL-MCNC: 155 NG/ML (ref 10–291)
GFR SERPLBLD CREATININE-BSD FMLA CKD-EPI: 78 ML/MIN/1.73 M 2
GLOBULIN SER CALC-MCNC: 2.7 G/DL (ref 1.9–3.5)
GLUCOSE SERPL-MCNC: 82 MG/DL (ref 65–99)
HDLC SERPL-MCNC: 47 MG/DL
IRON SERPL-MCNC: 100 UG/DL (ref 40–170)
LDLC SERPL CALC-MCNC: 114 MG/DL
MAGNESIUM SERPL-MCNC: 2.2 MG/DL (ref 1.5–2.5)
PHOSPHATE SERPL-MCNC: 3.1 MG/DL (ref 2.5–4.5)
POTASSIUM SERPL-SCNC: 4.7 MMOL/L (ref 3.6–5.5)
PROT SERPL-MCNC: 7 G/DL (ref 6–8.2)
SODIUM SERPL-SCNC: 138 MMOL/L (ref 135–145)
TRIGL SERPL-MCNC: 180 MG/DL (ref 0–149)
TSH SERPL-ACNC: 1.67 UIU/ML (ref 0.38–5.33)
URATE SERPL-MCNC: 3.5 MG/DL (ref 1.9–8.2)

## 2025-05-21 ENCOUNTER — HOSPITAL ENCOUNTER (OUTPATIENT)
Dept: LAB | Facility: MEDICAL CENTER | Age: 70
End: 2025-05-21
Attending: INTERNAL MEDICINE
Payer: MEDICARE

## 2025-05-21 LAB
APPEARANCE UR: CLEAR
BACTERIA #/AREA URNS HPF: NORMAL /HPF
BASOPHILS # BLD AUTO: 1.2 % (ref 0–1.8)
BASOPHILS # BLD: 0.05 K/UL (ref 0–0.12)
BILIRUB UR QL STRIP.AUTO: NEGATIVE
CASTS URNS QL MICRO: NORMAL /LPF (ref 0–2)
COLOR UR: YELLOW
EOSINOPHIL # BLD AUTO: 0.11 K/UL (ref 0–0.51)
EOSINOPHIL NFR BLD: 2.7 % (ref 0–6.9)
EPITHELIAL CELLS 1715: NORMAL /HPF (ref 0–5)
ERYTHROCYTE [DISTWIDTH] IN BLOOD BY AUTOMATED COUNT: 44.1 FL (ref 35.9–50)
GLUCOSE UR STRIP.AUTO-MCNC: NEGATIVE MG/DL
HCT VFR BLD AUTO: 34.4 % (ref 37–47)
HGB BLD-MCNC: 11.5 G/DL (ref 12–16)
IMM GRANULOCYTES # BLD AUTO: 0 K/UL (ref 0–0.11)
IMM GRANULOCYTES NFR BLD AUTO: 0 % (ref 0–0.9)
KETONES UR STRIP.AUTO-MCNC: NEGATIVE MG/DL
LEUKOCYTE ESTERASE UR QL STRIP.AUTO: NEGATIVE
LYMPHOCYTES # BLD AUTO: 1.73 K/UL (ref 1–4.8)
LYMPHOCYTES NFR BLD: 43.1 % (ref 22–41)
MCH RBC QN AUTO: 31.2 PG (ref 27–33)
MCHC RBC AUTO-ENTMCNC: 33.4 G/DL (ref 32.2–35.5)
MCV RBC AUTO: 93.2 FL (ref 81.4–97.8)
MICRO URNS: ABNORMAL
MONOCYTES # BLD AUTO: 0.39 K/UL (ref 0–0.85)
MONOCYTES NFR BLD AUTO: 9.7 % (ref 0–13.4)
NEUTROPHILS # BLD AUTO: 1.73 K/UL (ref 1.82–7.42)
NEUTROPHILS NFR BLD: 43.3 % (ref 44–72)
NITRITE UR QL STRIP.AUTO: NEGATIVE
NRBC # BLD AUTO: 0 K/UL
NRBC BLD-RTO: 0 /100 WBC (ref 0–0.2)
PH UR STRIP.AUTO: 6.5 [PH] (ref 5–8)
PLATELET # BLD AUTO: 303 K/UL (ref 164–446)
PMV BLD AUTO: 9.7 FL (ref 9–12.9)
PROT UR QL STRIP: NEGATIVE MG/DL
RBC # BLD AUTO: 3.69 M/UL (ref 4.2–5.4)
RBC # URNS HPF: NORMAL /HPF (ref 0–2)
RBC UR QL AUTO: ABNORMAL
SP GR UR STRIP.AUTO: 1.01
UROBILINOGEN UR STRIP.AUTO-MCNC: 0.2 EU/DL
WBC # BLD AUTO: 4 K/UL (ref 4.8–10.8)
WBC #/AREA URNS HPF: NORMAL /HPF

## 2025-05-21 PROCEDURE — 84550 ASSAY OF BLOOD/URIC ACID: CPT

## 2025-05-21 PROCEDURE — 81001 URINALYSIS AUTO W/SCOPE: CPT

## 2025-05-21 PROCEDURE — 36415 COLL VENOUS BLD VENIPUNCTURE: CPT

## 2025-05-21 PROCEDURE — 82728 ASSAY OF FERRITIN: CPT

## 2025-05-21 PROCEDURE — 83540 ASSAY OF IRON: CPT

## 2025-05-21 PROCEDURE — 80053 COMPREHEN METABOLIC PANEL: CPT

## 2025-05-21 PROCEDURE — 84443 ASSAY THYROID STIM HORMONE: CPT

## 2025-05-21 PROCEDURE — 84100 ASSAY OF PHOSPHORUS: CPT

## 2025-05-21 PROCEDURE — 85025 COMPLETE CBC W/AUTO DIFF WBC: CPT

## 2025-05-21 PROCEDURE — 80061 LIPID PANEL: CPT

## 2025-05-21 PROCEDURE — 83735 ASSAY OF MAGNESIUM: CPT

## 2025-05-22 LAB
ALBUMIN SERPL BCP-MCNC: 4.1 G/DL (ref 3.2–4.9)
ALBUMIN/GLOB SERPL: 1.8 G/DL
ALP SERPL-CCNC: 72 U/L (ref 30–99)
ALT SERPL-CCNC: 25 U/L (ref 2–50)
ANION GAP SERPL CALC-SCNC: 10 MMOL/L (ref 7–16)
AST SERPL-CCNC: 26 U/L (ref 12–45)
BILIRUB SERPL-MCNC: 0.4 MG/DL (ref 0.1–1.5)
BUN SERPL-MCNC: 14 MG/DL (ref 8–22)
CALCIUM ALBUM COR SERPL-MCNC: 8.9 MG/DL (ref 8.5–10.5)
CALCIUM SERPL-MCNC: 9 MG/DL (ref 8.5–10.5)
CHLORIDE SERPL-SCNC: 106 MMOL/L (ref 96–112)
CHOLEST SERPL-MCNC: 185 MG/DL (ref 100–199)
CO2 SERPL-SCNC: 25 MMOL/L (ref 20–33)
CREAT SERPL-MCNC: 0.79 MG/DL (ref 0.5–1.4)
FASTING STATUS PATIENT QL REPORTED: NORMAL
FERRITIN SERPL-MCNC: 159 NG/ML (ref 10–291)
GFR SERPLBLD CREATININE-BSD FMLA CKD-EPI: 80 ML/MIN/1.73 M 2
GLOBULIN SER CALC-MCNC: 2.3 G/DL (ref 1.9–3.5)
GLUCOSE SERPL-MCNC: 99 MG/DL (ref 65–99)
HDLC SERPL-MCNC: 55 MG/DL
IRON SERPL-MCNC: 76 UG/DL (ref 40–170)
LDLC SERPL CALC-MCNC: 112 MG/DL
MAGNESIUM SERPL-MCNC: 2 MG/DL (ref 1.5–2.5)
PHOSPHATE SERPL-MCNC: 3.8 MG/DL (ref 2.5–4.5)
POTASSIUM SERPL-SCNC: 4.5 MMOL/L (ref 3.6–5.5)
PROT SERPL-MCNC: 6.4 G/DL (ref 6–8.2)
SODIUM SERPL-SCNC: 141 MMOL/L (ref 135–145)
TRIGL SERPL-MCNC: 92 MG/DL (ref 0–149)
TSH SERPL-ACNC: 1.58 UIU/ML (ref 0.38–5.33)
URATE SERPL-MCNC: 4 MG/DL (ref 1.9–8.2)

## 2025-05-28 ENCOUNTER — HOSPITAL ENCOUNTER (OUTPATIENT)
Facility: MEDICAL CENTER | Age: 70
End: 2025-05-28
Payer: MEDICARE

## 2025-05-28 DIAGNOSIS — R35.0 URINARY FREQUENCY: ICD-10-CM

## 2025-05-28 DIAGNOSIS — R35.0 URINARY FREQUENCY: Primary | ICD-10-CM

## 2025-05-28 LAB
APPEARANCE UR: CLEAR
BACTERIA #/AREA URNS HPF: ABNORMAL /HPF
BILIRUB UR QL STRIP.AUTO: NEGATIVE
CASTS URNS QL MICRO: ABNORMAL /LPF (ref 0–2)
COLOR UR: YELLOW
EPITHELIAL CELLS 1715: ABNORMAL /HPF (ref 0–5)
GLUCOSE UR STRIP.AUTO-MCNC: NEGATIVE MG/DL
KETONES UR STRIP.AUTO-MCNC: NEGATIVE MG/DL
LEUKOCYTE ESTERASE UR QL STRIP.AUTO: ABNORMAL
MICRO URNS: ABNORMAL
NITRITE UR QL STRIP.AUTO: NEGATIVE
PH UR STRIP.AUTO: 6 [PH] (ref 5–8)
PROT UR QL STRIP: NEGATIVE MG/DL
RBC # URNS HPF: ABNORMAL /HPF (ref 0–2)
RBC UR QL AUTO: ABNORMAL
SP GR UR STRIP.AUTO: 1.01
UROBILINOGEN UR STRIP.AUTO-MCNC: 0.2 EU/DL
WBC #/AREA URNS HPF: ABNORMAL /HPF

## 2025-05-28 PROCEDURE — 81001 URINALYSIS AUTO W/SCOPE: CPT

## 2025-05-29 ENCOUNTER — RESULTS FOLLOW-UP (OUTPATIENT)
Dept: MEDICAL GROUP | Facility: PHYSICIAN GROUP | Age: 70
End: 2025-05-29

## 2025-05-29 DIAGNOSIS — N30.01 ACUTE CYSTITIS WITH HEMATURIA: Primary | ICD-10-CM

## 2025-05-29 RX ORDER — NITROFURANTOIN 25; 75 MG/1; MG/1
100 CAPSULE ORAL 2 TIMES DAILY
Qty: 10 CAPSULE | Refills: 0 | Status: SHIPPED | OUTPATIENT
Start: 2025-05-29

## 2025-06-05 ENCOUNTER — HOSPITAL ENCOUNTER (OUTPATIENT)
Facility: MEDICAL CENTER | Age: 70
End: 2025-06-05
Attending: INTERNAL MEDICINE
Payer: MEDICARE

## 2025-06-05 LAB
APPEARANCE UR: CLEAR
BACTERIA #/AREA URNS HPF: NORMAL /HPF
BILIRUB UR QL STRIP.AUTO: NEGATIVE
CASTS URNS QL MICRO: NORMAL /LPF (ref 0–2)
COLOR UR: YELLOW
EPITHELIAL CELLS 1715: NORMAL /HPF (ref 0–5)
GLUCOSE UR STRIP.AUTO-MCNC: NEGATIVE MG/DL
KETONES UR STRIP.AUTO-MCNC: NEGATIVE MG/DL
LEUKOCYTE ESTERASE UR QL STRIP.AUTO: NEGATIVE
MICRO URNS: ABNORMAL
NITRITE UR QL STRIP.AUTO: NEGATIVE
PH UR STRIP.AUTO: 7.5 [PH] (ref 5–8)
PROT UR QL STRIP: NEGATIVE MG/DL
RBC # URNS HPF: NORMAL /HPF (ref 0–2)
RBC UR QL AUTO: ABNORMAL
SP GR UR STRIP.AUTO: 1.01
UROBILINOGEN UR STRIP.AUTO-MCNC: 0.2 EU/DL
WBC #/AREA URNS HPF: NORMAL /HPF

## 2025-06-05 PROCEDURE — 81001 URINALYSIS AUTO W/SCOPE: CPT

## 2025-06-05 PROCEDURE — 87086 URINE CULTURE/COLONY COUNT: CPT

## 2025-06-08 LAB
BACTERIA UR CULT: NORMAL
SIGNIFICANT IND 70042: NORMAL
SITE SITE: NORMAL
SOURCE SOURCE: NORMAL

## 2025-07-07 ENCOUNTER — OFFICE VISIT (OUTPATIENT)
Dept: MEDICAL GROUP | Facility: PHYSICIAN GROUP | Age: 70
End: 2025-07-07
Payer: MEDICARE

## 2025-07-07 VITALS
HEART RATE: 66 BPM | BODY MASS INDEX: 24.89 KG/M2 | WEIGHT: 149.4 LBS | OXYGEN SATURATION: 99 % | TEMPERATURE: 98.1 F | DIASTOLIC BLOOD PRESSURE: 80 MMHG | SYSTOLIC BLOOD PRESSURE: 124 MMHG | HEIGHT: 65 IN

## 2025-07-07 DIAGNOSIS — Z71.9 ENCOUNTER FOR CONSULTATION: Primary | ICD-10-CM

## 2025-07-07 DIAGNOSIS — R79.89 ELEVATED CORTISOL LEVEL: ICD-10-CM

## 2025-07-07 DIAGNOSIS — R53.82 CHRONIC FATIGUE: ICD-10-CM

## 2025-07-07 PROCEDURE — 3079F DIAST BP 80-89 MM HG: CPT | Performed by: FAMILY MEDICINE

## 2025-07-07 PROCEDURE — 99213 OFFICE O/P EST LOW 20 MIN: CPT | Performed by: FAMILY MEDICINE

## 2025-07-07 PROCEDURE — 3074F SYST BP LT 130 MM HG: CPT | Performed by: FAMILY MEDICINE

## 2025-07-07 ASSESSMENT — FIBROSIS 4 INDEX: FIB4 SCORE: 1.2

## 2025-07-07 NOTE — PROGRESS NOTES
"Verbal consent was acquired by the patient to use card.io ambient listening note generation during this visit     Subjective:     HPI:   History of Present Illness  The patient presents for evaluation of low-grade fevers, generalized fatigue, and flushing.    Low-grade fever  - Experiencing issues since the end of January 2025  - Fever ranging from 93 to 100.8 degrees  - Continues to experience fevers despite treatment    Generalized fatigue  - Not as severe as during her Lyme disease episode  - No chest pain or breathing difficulties    Flushing  - Facial redness  - Constant heat  - Unique headache localized at the top of her head  - Burning eyes when cheeks redden    Supplemental information: Diagnosed with Lyme disease following a COVID-19 infection in 2021, which left her ill for 4 months. Lyme disease specialist, Dr. Durham, administered homeopathic treatments twice, but she did not respond as expected. Referred to [facility] for further treatment, including lymphatic drainage, which led to some improvement. Test for relapsing fever due to Lyme disease was inconclusive. Cortisol test conducted due to suspicion of Cushing's disease, with abnormal results. Currently on her second bottle of Cortisol Manager, Stress Hormone Stabilizer Synaffix, which contains ashwagandha and L-theanine with magnolia extract and phosphatidylserine, but it has not provided relief. JAQUI test was negative.    Health Maintenance: Completed    Objective:     Exam:  /80 (BP Location: Left arm, Patient Position: Sitting, BP Cuff Size: Adult)   Pulse 66   Temp 36.7 °C (98.1 °F) (Temporal)   Ht 1.651 m (5' 5\")   Wt 67.8 kg (149 lb 6.4 oz)   SpO2 99%   BMI 24.86 kg/m²  Body mass index is 24.86 kg/m².    Physical Exam  Constitutional:       Appearance: Normal appearance.   Cardiovascular:      Rate and Rhythm: Normal rate and regular rhythm.      Heart sounds: Normal heart sounds.   Pulmonary:      " Effort: Pulmonary effort is normal.      Breath sounds: Normal breath sounds.   Musculoskeletal:      Cervical back: Normal range of motion and neck supple.   Lymphadenopathy:      Cervical: No cervical adenopathy.   Neurological:      Mental Status: She is alert.             Results  Labs   - Lyme disease test for IgG: Negative   - Lyme disease test for IgM: Indeterminate   - Cortisol test: Normal levels except for the morning cortisol   - JAQUI test: Negative    Assessment & Plan:     1. Encounter for consultation  E-consult to Endocrinology      2. Elevated cortisol level        3. Chronic fatigue            Assessment & Plan  1.  Elevated cortisol level and chronic fatigue: Chronic, unstable.  She reports for last 6 months has been getting fatigue with episodes of low-grade fevers.  Then later in the 6 months she started getting flushing and redness of the cheeks and felt very hot along the redness of the cheeks with pain on the top of her head and then she also notes some heat sensitivity.  She started following with a homeopathic doctor in [facility] after a COVID infection when she developed long COVID symptoms.  He diagnosed her with Lyme disease and he suspects Lyme disease as a source of the bulk of her symptoms.  She has undergone a proprietary infusion with him to treat the Lyme disease though she could not tell me specifically what the medications or vitamins were.  That did not improve her symptoms so then he got lab work including saliva cortisol testing which did show slightly elevated cortisol level around 9 AM but the noon, evening, and night cortisol levels were in normal range.  This homeopathic doctor gave her a proprietary cortisol supplement herbal blend which she has been taking for the last month with no improvement in her symptoms.  She comes in today because she would like to look for other options regarding this high cortisol level.  - E consult to endocrinology ordered  - Will refer gabriele MCKAY  consult to her primary care provider to follow-up on the recommendations      Return if symptoms worsen or fail to improve.    Please note that this dictation was created using voice recognition software. I have made every reasonable attempt to correct obvious errors, but I expect that there are errors of grammar and possibly content that I did not discover before finalizing the note.

## 2025-07-09 ENCOUNTER — APPOINTMENT (OUTPATIENT)
Dept: MEDICAL GROUP | Facility: PHYSICIAN GROUP | Age: 70
End: 2025-07-09
Payer: MEDICARE

## 2025-07-28 ENCOUNTER — E-CONSULT (OUTPATIENT)
Dept: ENDOCRINOLOGY | Facility: MEDICAL CENTER | Age: 70
End: 2025-07-28
Payer: MEDICARE

## 2025-07-28 DIAGNOSIS — Z71.9 ENCOUNTER FOR CONSULTATION: Primary | ICD-10-CM

## 2025-07-29 NOTE — PROGRESS NOTES
"E-Consult Response     After careful review of the patient's information available in the medical record, the following are my findings and recommendations:    Reason for consult:  Interpret salivary cortisol findings  Question: \"Suggested next steps. Patient have non-localizing symptoms like low-grade fever for months. Homeopathic doctor ordered saliva cortisol testing with morning (~9:00) slightly elevated but noon, evening, and nightttime cortisols normal. In the meantime she is taking a proprietary integrative cortisol manager supplement.  Please assess and respond with your recommendations regarding interpretation of clinical findings.\"    Summary of data reviewed:   - 71 yo F  - with nonspecific symptoms such as low-grade fever for months  - euthyroid            DISCUSSION:  - Salivary cortisol is a validated tool for screening Cushing syndrome (late-night sampling) and, to a lesser extent, for adrenal insufficiency (early morning sampling). However, interpretation requires strict adherence to standardized collection protocols, validated assays, and appropriate reference ranges, which are often lacking in non-clinical or homeopathic settings    - Diurnal variation is expected, with peak cortisol in the early morning and a decline throughout the day. A single, slightly elevated morning salivary cortisol, in the absence of loss of diurnal rhythm or clinical features of hypercortisolism, is not diagnostic of Cushing syndrome or other adrenal pathology.    - The clinical utility of salivary cortisol is highest for late-night (23:00) measurements to screen for Cushing syndrome, and for very low morning values to screen for adrenal insufficiency. Mildly elevated morning values are non-specific and may reflect physiological, situational, or assay-related variation    - The patient's symptoms (low-grade fever, chronicity) are not typical for adrenal insufficiency or Cushing syndrome. There is no evidence of loss of " "diurnal variation or other features suggestive of adrenal disease.    - Suspect Cushing syndrome if: facial plethora, proximal muscle weakness, easy bruising without significant trauma, wide (> 1 cm), violaceous striae, especially on the abdomen, thighs, or arms, thin skin and increase skin fragility, unexplained osteoporosis or vertebral fractures, new-onset/worsening of HTN, new-onset or poorly controlled DM, unexplained hypokalemia, \"buffalo hump\" or supraclavicular fat pads, rapid, unexplained central weight gain with loss of peripheral fat, incidental adrenal mass    - Proprietary \"cortisol manager\" supplements, often containing herbal adaptogens such as Withania somnifera (ashwagandha), have shown modest short-term reductions in cortisol in stressed individuals, but their long-term safety and impact on adrenal function are unproven and not recommended for diagnostic or therapeutic use in adrenal disorders.    - Isolated, slightly elevated morning salivary cortisol from a non-standardized assay, in the absence of clinical features of adrenal disease, does not warrant further endocrine investigation.    Recommendations:   No further adrenal workup is indicated based on current findings and clinical context.  Recommend discontinuing non-evidence-based supplements, as they may confound interpretation and have unknown long-term effects.  If symptoms persist or evolve, consider a standard endocrine evaluation, including a thorough history, physical exam, and, if indicated, validated biochemical testing (e.g., dexamethasone suppression test - for Cushing's  syndrome work up or 8 am serum cortisol, ACTH - for concerns of adrenal insufficiency).  Reassure the patient regarding the lack of evidence for adrenal disease at this time.    E-Consult Time: 30 minutes were spent with >50% of the total time spent reviewing items outlined in the summary of data reviewed (Use code [id number])    Kirstin Briones M.D.      "

## 2025-08-08 ENCOUNTER — DOCUMENTATION (OUTPATIENT)
Dept: HEALTH INFORMATION MANAGEMENT | Facility: OTHER | Age: 70
End: 2025-08-08
Payer: MEDICARE

## (undated) DEVICE — DRAPE IOBAN II INCISE 23X17 - (10EA/BX 4BX/CA)

## (undated) DEVICE — PACK SHOULDER ARTHROSCOPY SM - (2EA/CA)

## (undated) DEVICE — WATER IRRIGATION STERILE 1000ML (12EA/CA)

## (undated) DEVICE — SPONGE GAUZESTER 4 X 4 4PLY - (128PK/CA)

## (undated) DEVICE — SUTURE 3-0 ETHILON FS-1 - (36/BX) 30 INCH

## (undated) DEVICE — GOWN SURGICAL X-LARGE ULTRA - FILM-REINFORCED (20/CA)

## (undated) DEVICE — GLOVE, LITE (PAIR)

## (undated) DEVICE — MASK ANESTHESIA ADULT  - (100/CA)

## (undated) DEVICE — DRESSING ABDOMINAL PAD STERILE 8 X 10" (360EA/CA)"

## (undated) DEVICE — IMMOBILIZER, SHOULDER (UNIVERS)

## (undated) DEVICE — ELECTRODE DUAL RETURN W/ CORD - (50/PK)

## (undated) DEVICE — NEPTUNE 4 PORT MANIFOLD - (20/PK)

## (undated) DEVICE — CANISTER SUCTION RIGID RED 1500CC (40EA/CA)

## (undated) DEVICE — BLOCK

## (undated) DEVICE — CHLORAPREP 26 ML APPLICATOR - ORANGE TINT(25/CA)

## (undated) DEVICE — LACTATED RINGERS INJ 1000 ML - (14EA/CA 60CA/PF)

## (undated) DEVICE — KIT ANESTHESIA W/CIRCUIT & 3/LT BAG W/FILTER (20EA/CA)

## (undated) DEVICE — SLEEVE SHOULDER DISP(ARTHREX) - (6/BX)

## (undated) DEVICE — SHAVER4.0 AGGRESSIVE + FORMLA (5EA/BX)

## (undated) DEVICE — PROTECTOR ULNA NERVE - (36PR/CA)

## (undated) DEVICE — HUMID-VENT HEAT AND MOISTURE EXCHANGE- (50/BX)

## (undated) DEVICE — SYRINGE ASEPTO - (50EA/CA

## (undated) DEVICE — TUBING PATIENT W/CONNECTOR REDEUCE (1EA)

## (undated) DEVICE — TUBING PUMP WITH CONNECTOR REDEUCE (1EA)

## (undated) DEVICE — TUBE E-T HI-LO CUFF 6.5MM (10EA/BX)

## (undated) DEVICE — DRAPE U SPLIT IMP 54 X 76 - (24/CA)

## (undated) DEVICE — ABLATOR WAND SERFAS 90-S CRUISE

## (undated) DEVICE — SUCTION INSTRUMENT YANKAUER BULBOUS TIP W/O VENT (50EA/CA)

## (undated) DEVICE — GLOVE BIOGEL SZ 7 SURGICAL PF LTX - (50PR/BX 4BX/CA)

## (undated) DEVICE — DRAPE SHOULDER FLUID CONTROL - 77 X 85 (10/CA)

## (undated) DEVICE — TAPE CLOTH MEDIPORE 6 INCH - (12RL/CA)

## (undated) DEVICE — SODIUM CHL IRRIGATION 0.9% 1000ML (12EA/CA)

## (undated) DEVICE — BAG, SPONGE COUNT 50600

## (undated) DEVICE — GOWN WARMING STANDARD FLEX - (30/CA)

## (undated) DEVICE — TUBE CONNECTING SUCTION - CLEAR PLASTIC STERILE 72 IN (50EA/CA)

## (undated) DEVICE — SENSOR SPO2 NEO LNCS ADHESIVE (20/BX) SEE USER NOTES

## (undated) DEVICE — CANNULA THREADED 5X75 (5EA/BX)

## (undated) DEVICE — ELECTRODE 850 FOAM ADHESIVE - HYDROGEL RADIOTRNSPRNT (50/PK)

## (undated) DEVICE — SODIUM CHL. IRRIGATION 0.9% 3000ML (4EA/CA 65CA/PF)

## (undated) DEVICE — HEAD HOLDER JUNIOR/ADULT

## (undated) DEVICE — CANNULA FULLY THREADED 8 X 75 (5EA/BX)

## (undated) DEVICE — GLOVE 7.0 LF PF PROTEXIS (50PR/BX)

## (undated) DEVICE — SHAVER, 5.5 RESECTOR

## (undated) DEVICE — SUTURE GENERAL

## (undated) DEVICE — DRESSING XEROFORM 1X8 - (50/BX 4BX/CA)

## (undated) DEVICE — KIT ROOM DECONTAMINATION